# Patient Record
Sex: FEMALE | Race: WHITE | NOT HISPANIC OR LATINO | URBAN - METROPOLITAN AREA
[De-identification: names, ages, dates, MRNs, and addresses within clinical notes are randomized per-mention and may not be internally consistent; named-entity substitution may affect disease eponyms.]

---

## 2017-08-15 ENCOUNTER — INPATIENT (INPATIENT)
Facility: HOSPITAL | Age: 82
LOS: 2 days | Discharge: HOME | DRG: 391 | End: 2017-08-18
Attending: INTERNAL MEDICINE | Admitting: INTERNAL MEDICINE
Payer: MEDICARE

## 2017-08-15 VITALS — DIASTOLIC BLOOD PRESSURE: 104 MMHG | SYSTOLIC BLOOD PRESSURE: 152 MMHG | HEART RATE: 86 BPM

## 2017-08-15 DIAGNOSIS — M54.9 DORSALGIA, UNSPECIFIED: ICD-10-CM

## 2017-08-15 LAB
ALBUMIN SERPL ELPH-MCNC: 4.5 G/DL — SIGNIFICANT CHANGE UP (ref 3.3–5)
ALP SERPL-CCNC: 96 U/L — SIGNIFICANT CHANGE UP (ref 40–120)
ALT FLD-CCNC: 13 U/L RC — SIGNIFICANT CHANGE UP (ref 10–45)
ANION GAP SERPL CALC-SCNC: 14 MMOL/L — SIGNIFICANT CHANGE UP (ref 5–17)
APPEARANCE UR: CLEAR — SIGNIFICANT CHANGE UP
APTT BLD: 45.6 SEC — HIGH (ref 27.5–37.4)
AST SERPL-CCNC: 20 U/L — SIGNIFICANT CHANGE UP (ref 10–40)
BACTERIA # UR AUTO: ABNORMAL /HPF
BASOPHILS # BLD AUTO: 0 K/UL — SIGNIFICANT CHANGE UP (ref 0–0.2)
BASOPHILS NFR BLD AUTO: 0.2 % — SIGNIFICANT CHANGE UP (ref 0–2)
BILIRUB SERPL-MCNC: 0.7 MG/DL — SIGNIFICANT CHANGE UP (ref 0.2–1.2)
BILIRUB UR-MCNC: NEGATIVE — SIGNIFICANT CHANGE UP
BUN SERPL-MCNC: 17 MG/DL — SIGNIFICANT CHANGE UP (ref 7–23)
CALCIUM SERPL-MCNC: 9.3 MG/DL — SIGNIFICANT CHANGE UP (ref 8.4–10.5)
CHLORIDE SERPL-SCNC: 101 MMOL/L — SIGNIFICANT CHANGE UP (ref 96–108)
CO2 SERPL-SCNC: 26 MMOL/L — SIGNIFICANT CHANGE UP (ref 22–31)
COLOR SPEC: YELLOW — SIGNIFICANT CHANGE UP
CREAT SERPL-MCNC: 1.08 MG/DL — SIGNIFICANT CHANGE UP (ref 0.5–1.3)
CRP SERPL-MCNC: 6 MG/DL — HIGH (ref 0–0.4)
DIFF PNL FLD: ABNORMAL
EOSINOPHIL # BLD AUTO: 0.1 K/UL — SIGNIFICANT CHANGE UP (ref 0–0.5)
EOSINOPHIL NFR BLD AUTO: 1.2 % — SIGNIFICANT CHANGE UP (ref 0–6)
EPI CELLS # UR: SIGNIFICANT CHANGE UP /HPF
ERYTHROCYTE [SEDIMENTATION RATE] IN BLOOD: 37 MM/HR — HIGH (ref 0–20)
GLUCOSE SERPL-MCNC: 121 MG/DL — HIGH (ref 70–99)
GLUCOSE UR QL: NEGATIVE — SIGNIFICANT CHANGE UP
HCT VFR BLD CALC: 43.6 % — SIGNIFICANT CHANGE UP (ref 34.5–45)
HGB BLD-MCNC: 14.7 G/DL — SIGNIFICANT CHANGE UP (ref 11.5–15.5)
INR BLD: 3.24 RATIO — HIGH (ref 0.88–1.16)
KETONES UR-MCNC: NEGATIVE — SIGNIFICANT CHANGE UP
LEUKOCYTE ESTERASE UR-ACNC: NEGATIVE — SIGNIFICANT CHANGE UP
LYMPHOCYTES # BLD AUTO: 0.7 K/UL — LOW (ref 1–3.3)
LYMPHOCYTES # BLD AUTO: 7.5 % — LOW (ref 13–44)
MCHC RBC-ENTMCNC: 30.6 PG — SIGNIFICANT CHANGE UP (ref 27–34)
MCHC RBC-ENTMCNC: 33.6 GM/DL — SIGNIFICANT CHANGE UP (ref 32–36)
MCV RBC AUTO: 91 FL — SIGNIFICANT CHANGE UP (ref 80–100)
MONOCYTES # BLD AUTO: 0.7 K/UL — SIGNIFICANT CHANGE UP (ref 0–0.9)
MONOCYTES NFR BLD AUTO: 6.8 % — SIGNIFICANT CHANGE UP (ref 2–14)
NEUTROPHILS # BLD AUTO: 8.2 K/UL — HIGH (ref 1.8–7.4)
NEUTROPHILS NFR BLD AUTO: 84.4 % — HIGH (ref 43–77)
NITRITE UR-MCNC: NEGATIVE — SIGNIFICANT CHANGE UP
PH UR: 7 — SIGNIFICANT CHANGE UP (ref 5–8)
PLATELET # BLD AUTO: 149 K/UL — LOW (ref 150–400)
POTASSIUM SERPL-MCNC: 4 MMOL/L — SIGNIFICANT CHANGE UP (ref 3.5–5.3)
POTASSIUM SERPL-SCNC: 4 MMOL/L — SIGNIFICANT CHANGE UP (ref 3.5–5.3)
PROT SERPL-MCNC: 7.9 G/DL — SIGNIFICANT CHANGE UP (ref 6–8.3)
PROT UR-MCNC: 30 MG/DL
PROTHROM AB SERPL-ACNC: 36.2 SEC — HIGH (ref 9.8–12.7)
RBC # BLD: 4.8 M/UL — SIGNIFICANT CHANGE UP (ref 3.8–5.2)
RBC # FLD: 13.6 % — SIGNIFICANT CHANGE UP (ref 10.3–14.5)
RBC CASTS # UR COMP ASSIST: SIGNIFICANT CHANGE UP /HPF (ref 0–2)
SODIUM SERPL-SCNC: 141 MMOL/L — SIGNIFICANT CHANGE UP (ref 135–145)
SP GR SPEC: 1.02 — SIGNIFICANT CHANGE UP (ref 1.01–1.02)
UROBILINOGEN FLD QL: NEGATIVE — SIGNIFICANT CHANGE UP
WBC # BLD: 9.7 K/UL — SIGNIFICANT CHANGE UP (ref 3.8–10.5)
WBC # FLD AUTO: 9.7 K/UL — SIGNIFICANT CHANGE UP (ref 3.8–10.5)
WBC UR QL: SIGNIFICANT CHANGE UP /HPF (ref 0–5)

## 2017-08-15 PROCEDURE — 70450 CT HEAD/BRAIN W/O DYE: CPT | Mod: 26

## 2017-08-15 PROCEDURE — 71010: CPT | Mod: 26

## 2017-08-15 PROCEDURE — 76377 3D RENDER W/INTRP POSTPROCES: CPT | Mod: 26

## 2017-08-15 PROCEDURE — 72192 CT PELVIS W/O DYE: CPT | Mod: 26

## 2017-08-15 PROCEDURE — 99285 EMERGENCY DEPT VISIT HI MDM: CPT | Mod: GC

## 2017-08-15 RX ORDER — ACETAMINOPHEN 500 MG
1000 TABLET ORAL ONCE
Qty: 0 | Refills: 0 | Status: COMPLETED | OUTPATIENT
Start: 2017-08-15 | End: 2017-08-15

## 2017-08-15 RX ADMIN — Medication 400 MILLIGRAM(S): at 14:36

## 2017-08-15 NOTE — H&P ADULT - NSHPPHYSICALEXAM_GEN_ALL_CORE
PHYSICAL EXAM:      Constitutional: Well built, NAD, AAOx3    Eyes: No pallor or icterus.     ENMT: Normal.    Neck: Supple    Breasts:    Back: Normal.    Respiratory: B/L A/E present, no wheezing or rhonchi    Cardiovascular: S1 and S2+, no m, r, g.    Gastrointestinal: Soft, BS+, no organomegaly    Genitourinary: Deferred.    Rectal: Deferred.    Extremities: No C/C/E.    Vascular: Pedal Pulses equal     Neurological: AAOx3, No FND    Skin: No rash or wounds    Lymph Nodes: Not palpable    Musculoskeletal: No joint deformity. Tenderness low back.    Psychiatric: No agitation noted.

## 2017-08-15 NOTE — ED PROVIDER NOTE - PROGRESS NOTE DETAILS
Patient unable to ambulate with assistance or walker due to pain. Patient also febrile. Will obtain cxr, blood cultures, most likely admit to medicine for PT and potential antibiotics if source found.

## 2017-08-15 NOTE — ED ADULT NURSE NOTE - PMH
Atrial fibrillation    CVA (cerebral infarction)    Gastritis    Hypertension    IBS (irritable bowel syndrome)    Smoking

## 2017-08-15 NOTE — H&P ADULT - NSHPLABSRESULTS_GEN_ALL_CORE
14.7   9.7   )-----------( 149      ( 15 Aug 2017 14:18 )             43.6   08-15    141  |  101  |  17  ----------------------------<  121<H>  4.0   |  26  |  1.08    Ca    9.3      15 Aug 2017 14:18    TPro  7.9  /  Alb  4.5  /  TBili  0.7  /  DBili  x   /  AST  20  /  ALT  13  /  AlkPhos  96  08-15  < from: CT Pelvis Bony Only No Cont (08.15.17 @ 14:41) >    EXAM:  CT PELVIS BONY ONLY                          EXAM:  CT 3D RECONSTRUCT W CHANCE                            PROCEDURE DATE:  08/15/2017            INTERPRETATION:  CLINICAL INFORMATION: Blunt pelvic trauma.    COMPARISON: CT Chest Abdomen Pelvis from 9/13/2013.    PROCEDURE:   CT of the bony pelvis for trauma with coronal and 3-D constructions.    FINDINGS:    The patient is status post total left hip arthroplasty with cemented   femoral component. A cerclage wire surrounds the left femoral head of the   prosthesis and 2 screws transfix the acetabular cup to the left iliac   bone. There is mild heterotopic ossification about the anterior aspect of   the left hip joint and in the region of the lesser trochanter/distal left   iliopsoas tendon. But no periprosthetic fracture is identified. Chronic   appearing post traumatic deformities are noted in the left superior and   inferior pubic rami.    There is no evidence of a right-sided hip fracture. There is mild joint   space narrowing in the right hip. There is mild to moderate bilateral   sacroiliac arthrosis but no evidence of acute sacral alar fracture. Mild   pubic symphysis arthrosis is also noted.    There is a transitional vertebral body at the lumbosacral junction with   broad L5 transverse processes which partially articulate with the sacrum   on the left and nearly articulates with the sacrum on the right side.    Within the pelvis, there is sigmoid diverticulosis without diverticulitis   there is no free fluid in the pelvis.    There is a stable partially calcified left adnexal mass measuring 3.6 cm   in diameter, seen on prior CT from 9/13/2013,. This is nonspecific and   may reflect the left ovary. . This is unchanged and size in appearance   since the priorCT scan.     No pelvic hematoma seen.  There is a   calcified abdominal aorta and common iliac arteries.    Degenerative disc disease in the lower lumbar spine.    Three-D reconstructed images confirm these findings.    IMPRESSION:    Status post total left hip arthroplasty. No evidence of acute   periprosthetic fracture.    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    No acute fracture identified in the left hip, right hip, or throughout   the bony pelvis.    Nonspecific left adnexal mass which is stable since the prior CT scan of   9/13/2013      < end of copied text >

## 2017-08-15 NOTE — H&P ADULT - NSHPREVIEWOFSYSTEMS_GEN_ALL_CORE
REVIEW OF SYSTEMS      General:	No weight gain or weight loss, + fever. No weakness, malaise or fatigue. No headache.    Skin/Breast: No rash or wound  	  Ophthalmologic: No vision changes  	  ENMT:	No difficulty hearing, no sore throat.    Respiratory and Thorax: No cough or SOB  	  Cardiovascular: No chest pain or SOB or BROWNLEE. No palpitations or skipped beats.	 No leg swelling.    Gastrointestinal: No N/V/C/D. No Hematochezia	    Genitourinary: No dysuria or hematuria or incontinence	    Musculoskeletal: LBP. No C/C/E	    Neurological: No dementia or FND	    Psychiatric: No depression or anxiety	    Hematology/Lymphatics: No anemia, leukemia 	    Endocrine:	No polydipsia or polyuria, no heat or cold intolerance.     Allergic/Immunologic:	No allergies.

## 2017-08-15 NOTE — ED PROVIDER NOTE - OBJECTIVE STATEMENT
Dr. Frank Note: 86F here with daughter and an aide with c/o R posterior pelvis pain for 2 days s/p fall, witnessed by another aide who reports gentle fall but patient described fall on head, no LOC, c/o headache and posterior pain, worse with ambulation, better with rest.  Pt on coumadin for afib for prior stroke without residual weakness.  Pt at home with 24hr aides, uses walker. Dr. Frank Note: 86F here with daughter and an aide with c/o R posterior pelvis pain for 2 days s/p fall, witnessed by another aide who reports gentle fall but patient described fall on head, no LOC, c/o headache and posterior pain, worse with ambulation, better with rest.  Pt on coumadin for afib for prior stroke without residual weakness.  Pt at home with 24hr aides, uses walker. PMD: Raquel Delvalle

## 2017-08-15 NOTE — ED ADULT NURSE NOTE - OBJECTIVE STATEMENT
Pt BIBA for evaluation of lower back pain s/p fall.  Pt AXOX3, alert to person, place, year.  As per daughter has some confusion usually.  As per pt she was walking in home and fell backwards hitting a piece of furniture on head and lower back.  Pt c/o head and back pain.  Pt Daughter states uncertain about fall, fall occurred 2 days ago and was with aid but fall was unreported.  Pt calm, cooperative.  Pt states pain comes and goes in severity.  PT has 20 gauge in right A/C.  Pt denies dizziness, N/V, F/C, bleeding, dark stools, numbness/tingling, CP, SOB. Pt well appearing.

## 2017-08-15 NOTE — ED PROVIDER NOTE - MEDICAL DECISION MAKING DETAILS
Dr. Frank Note: r/o ICH and r/o pelvic hematoma given blunt trauma on coumadin, r/o pelvic fx, consider outpt given 24-hr aide and non-operative (vs hip fx and operative repair).

## 2017-08-15 NOTE — H&P ADULT - ASSESSMENT
86F here with daughter and an aide with c/o R posterior pelvis pain for 2 days s/p fall, witnessed by another aide who reports gentle fall but patient described fall on head, no LOC, c/o headache and posterior pain, worse with ambulation, better with rest.  Pt on coumadin for afib for prior stroke without residual weakness.  Pt at home with 24hr aides, uses walker. Pain 7/10, when she gets up. Also daughter concerned about low grade fever. PMD: Raquel Delvalle

## 2017-08-15 NOTE — ED ADULT NURSE REASSESSMENT NOTE - NS ED NURSE REASSESS COMMENT FT1
Pt straight cath'd for urine, 2 RNs present.  Patient education provided prior to insertion. Sterile technique maintained. Pt tolerated procedure well.  Drained 150ml clear yellow urine.

## 2017-08-16 DIAGNOSIS — I10 ESSENTIAL (PRIMARY) HYPERTENSION: ICD-10-CM

## 2017-08-16 DIAGNOSIS — R50.9 FEVER, UNSPECIFIED: ICD-10-CM

## 2017-08-16 DIAGNOSIS — I48.91 UNSPECIFIED ATRIAL FIBRILLATION: ICD-10-CM

## 2017-08-16 DIAGNOSIS — M54.9 DORSALGIA, UNSPECIFIED: ICD-10-CM

## 2017-08-16 LAB
CULTURE RESULTS: NO GROWTH — SIGNIFICANT CHANGE UP
INR BLD: 3.09 RATIO — HIGH (ref 0.88–1.16)
PROTHROM AB SERPL-ACNC: 34.5 SEC — HIGH (ref 9.8–12.7)
SPECIMEN SOURCE: SIGNIFICANT CHANGE UP

## 2017-08-16 RX ORDER — CEPHALEXIN 500 MG
250 CAPSULE ORAL DAILY
Qty: 0 | Refills: 0 | Status: DISCONTINUED | OUTPATIENT
Start: 2017-08-16 | End: 2017-08-18

## 2017-08-16 RX ORDER — QUETIAPINE FUMARATE 200 MG/1
25 TABLET, FILM COATED ORAL AT BEDTIME
Qty: 0 | Refills: 0 | Status: DISCONTINUED | OUTPATIENT
Start: 2017-08-16 | End: 2017-08-17

## 2017-08-16 RX ORDER — LOSARTAN POTASSIUM 100 MG/1
50 TABLET, FILM COATED ORAL DAILY
Qty: 0 | Refills: 0 | Status: DISCONTINUED | OUTPATIENT
Start: 2017-08-16 | End: 2017-08-18

## 2017-08-16 RX ORDER — SERTRALINE 25 MG/1
100 TABLET, FILM COATED ORAL DAILY
Qty: 0 | Refills: 0 | Status: DISCONTINUED | OUTPATIENT
Start: 2017-08-16 | End: 2017-08-18

## 2017-08-16 RX ORDER — SERTRALINE 25 MG/1
50 TABLET, FILM COATED ORAL DAILY
Qty: 0 | Refills: 0 | Status: DISCONTINUED | OUTPATIENT
Start: 2017-08-16 | End: 2017-08-18

## 2017-08-16 RX ORDER — CHOLECALCIFEROL (VITAMIN D3) 125 MCG
1000 CAPSULE ORAL DAILY
Qty: 0 | Refills: 0 | Status: DISCONTINUED | OUTPATIENT
Start: 2017-08-16 | End: 2017-08-18

## 2017-08-16 RX ADMIN — SERTRALINE 50 MILLIGRAM(S): 25 TABLET, FILM COATED ORAL at 12:27

## 2017-08-16 RX ADMIN — Medication 250 MILLIGRAM(S): at 22:02

## 2017-08-16 RX ADMIN — SERTRALINE 100 MILLIGRAM(S): 25 TABLET, FILM COATED ORAL at 12:27

## 2017-08-16 RX ADMIN — Medication 1000 UNIT(S): at 12:26

## 2017-08-16 RX ADMIN — LOSARTAN POTASSIUM 50 MILLIGRAM(S): 100 TABLET, FILM COATED ORAL at 05:36

## 2017-08-16 NOTE — CONSULT NOTE ADULT - ASSESSMENT
87 y/o F with PMH of CVA, Afib (on coumadin), IBS, chronic UTI's, HTN, former tobacco abuse (35 pack yrs) presents 8/15 after mechanical fall at home. Noted to have low grade fever in ER of unclear etiology

## 2017-08-16 NOTE — CONSULT NOTE ADULT - PROBLEM SELECTOR RECOMMENDATION 9
-f/u pan cultures  -Hold on abx per ID recs  -No evidence of PNA on CXR, patient is normoxic (94% on RA) with no pulmonary complaints

## 2017-08-16 NOTE — PROGRESS NOTE ADULT - SUBJECTIVE AND OBJECTIVE BOX
Patient is a 86y old  Female who presents with a chief complaint of S/p fall & back pain, UTI ? (15 Aug 2017 21:39)  Patient seen and examined, daughter at bedside.    INTERVAL HPI/OVERNIGHT EVENTS: None    T(C): 36.8 (17 @ 16:00), Max: 36.8 (17 @ 16:00)  HR: 91 (17 @ 16:00) (91 - 98)  BP: 110/65 (17 @ 16:00) (110/65 - 148/76)  RR: 18 (17 @ 16:00) (18 - 18)  SpO2: 94% (17 @ 16:00) (94% - 95%)  Wt(kg): --  I&O's Summary    16 Aug 2017 07:01  -  16 Aug 2017 23:33  --------------------------------------------------------  IN: 720 mL / OUT: 0 mL / NET: 720 mL        PAST MEDICAL & SURGICAL HISTORY:  IBS (irritable bowel syndrome)  Gastritis  Atrial fibrillation  CVA (cerebral infarction)  Smoking  Hypertension  No significant past surgical history      REVIEW OF SYSTEMS:  CONSTITUTIONAL: No fever, weight loss, or fatigue  EYES: No eye pain, visual disturbances, or discharge  ENMT:  No difficulty hearing, tinnitus, vertigo; No sinus or throat pain  NECK: No pain or stiffness  RESPIRATORY: No cough, wheezing, chills or hemoptysis; No shortness of breath  CARDIOVASCULAR: No chest pain, palpitations, dizziness, or leg swelling  GASTROINTESTINAL: No abdominal or epigastric pain. No nausea, vomiting, or hematemesis; No diarrhea or constipation. No melena or hematochezia.  GENITOURINARY: No dysuria, frequency, hematuria, or incontinence  NEUROLOGICAL: No headaches, memory loss, loss of strength, numbness, or tremors  SKIN: No itching, burning, rashes, or lesions   LYMPH NODES: No enlarged glands  ENDOCRINE: No heat or cold intolerance; No hair loss  MUSCULOSKELETAL: No joint pain or swelling; No extremity pain. +LBP  PSYCHIATRIC: No depression, anxiety, mood swings, or difficulty sleeping  HEME/LYMPH: No easy bruising, or bleeding gums  ALLERY AND IMMUNOLOGIC: No hives or eczema    RADIOLOGY & ADDITIONAL TESTS:    Imaging Personally Reviewed:  [ ] YES  [ ] NO    Consultant(s) Notes Reviewed:  [+ ] YES  [ ] NO    PHYSICAL EXAM:  GENERAL: NAD, well-groomed, well-developed  HEAD:  Atraumatic, Normocephalic  EYES: EOMI, PERRLA, conjunctiva and sclera clear  ENMT: No tonsillar erythema, exudates, or enlargement; Moist mucous membranes, Good dentition, No lesions  NECK: Supple, No JVD, Normal thyroid  NERVOUS SYSTEM:  Alert & Oriented X3, Good concentration; Motor Strength 5/5 B/L upper and lower extremities; DTRs 2+ intact and symmetric  CHEST/LUNG: Clear to percussion bilaterally; No rales, rhonchi, wheezing, or rubs  HEART: Regular rate and rhythm; No murmurs, rubs, or gallops  ABDOMEN: Soft, Nontender, Nondistended; Bowel sounds present  EXTREMITIES:  2+ Peripheral Pulses, No clubbing, cyanosis, or edema  LYMPH: No lymphadenopathy noted  SKIN: No rashes or lesions    LABS:                        14.7   9.7   )-----------( 149      ( 15 Aug 2017 14:18 )             43.6     08-15    141  |  101  |  17  ----------------------------<  121<H>  4.0   |  26  |  1.08    Ca    9.3      15 Aug 2017 14:18    TPro  7.9  /  Alb  4.5  /  TBili  0.7  /  DBili  x   /  AST  20  /  ALT  13  /  AlkPhos  96  08-15    PT/INR - ( 16 Aug 2017 16:30 )   PT: 34.5 sec;   INR: 3.09 ratio         PTT - ( 15 Aug 2017 14:18 )  PTT:45.6 sec  Urinalysis Basic - ( 15 Aug 2017 15:23 )    Color: Yellow / Appearance: Clear / S.016 / pH: x  Gluc: x / Ketone: Negative  / Bili: Negative / Urobili: Negative   Blood: x / Protein: 30 mg/dL / Nitrite: Negative   Leuk Esterase: Negative / RBC: 0-2 /HPF / WBC 3-5 /HPF   Sq Epi: x / Non Sq Epi: x / Bacteria: Few /HPF      CAPILLARY BLOOD GLUCOSE            Urinalysis Basic - ( 15 Aug 2017 15:23 )    Color: Yellow / Appearance: Clear / S.016 / pH: x  Gluc: x / Ketone: Negative  / Bili: Negative / Urobili: Negative   Blood: x / Protein: 30 mg/dL / Nitrite: Negative   Leuk Esterase: Negative / RBC: 0-2 /HPF / WBC 3-5 /HPF   Sq Epi: x / Non Sq Epi: x / Bacteria: Few /HPF        MEDICATIONS  (STANDING):  losartan 50 milliGRAM(s) Oral daily  sertraline 100 milliGRAM(s) Oral daily  sertraline 50 milliGRAM(s) Oral daily  cholecalciferol 1000 Unit(s) Oral daily  cephalexin 250 milliGRAM(s) Oral daily  QUEtiapine 25 milliGRAM(s) Oral at bedtime    MEDICATIONS  (PRN):      Care Discussed with Consultants/Other Providers [ ] YES  [ ] NO

## 2017-08-16 NOTE — CHART NOTE - NSCHARTNOTEFT_GEN_A_CORE
Pt daughter verbalized that pt does not have allergy to yeast, wheat or gluten. Will remove allergy from diet.  Laure SCOTT

## 2017-08-16 NOTE — CONSULT NOTE ADULT - SUBJECTIVE AND OBJECTIVE BOX
CHIEF COMPLAINT:Patient is a 86y old  Female who presents with a chief complaint of S/p fall & back pain, UTI ? (15 Aug 2017 21:39)      HISTORY OF PRESENT ILLNESS:HPI:  86F here with daughter and an aide with c/o R posterior pelvis pain for 2 days s/p fall, witnessed by another aide who reports gentle fall but patient described fall on head, no LOC, c/o headache and posterior pain, worse with ambulation, better with rest.  Pt on coumadin for afib for prior stroke without residual weakness.  Pt at home with 24hr aides, uses walker. Pain 7/10, when she gets up. Also daughter concerned about low grade fever. Pt reports 3 traumatic falls in the past with resultant fractures. She was hospitalized at [facility] and upon discussion with her cardiologist the continued AC.   PMD: Raquel Delvalle (15 Aug 2017 21:09)      PAST MEDICAL & SURGICAL HISTORY:  IBS (irritable bowel syndrome)  Gastritis  Atrial fibrillation  CVA (cerebral infarction)  Smoking  Hypertension  No significant past surgical history          MEDICATIONS:  losartan 50 milliGRAM(s) Oral daily    cephalexin 250 milliGRAM(s) Oral daily      sertraline 100 milliGRAM(s) Oral daily  sertraline 50 milliGRAM(s) Oral daily  QUEtiapine 25 milliGRAM(s) Oral at bedtime        cholecalciferol 1000 Unit(s) Oral daily      FAMILY HISTORY:      Non-contributory    SOCIAL HISTORY:    not an active smoker    Allergies    Levaquin (Faint)  penicillin (Faint)  sulfa drugs (Unknown)    Intolerances    Lorabid (Faint)  	    REVIEW OF SYSTEMS:  CONSTITUTIONAL: No fever  EYES: No eye pain, visual disturbances, or discharge  ENMT:  No difficulty hearing, tinnitus  NECK: No pain or stiffness  RESPIRATORY: No cough, wheezing,  CARDIOVASCULAR: No chest pain, palpitations, dizziness, or leg swelling  GASTROINTESTINAL:  No nausea, vomiting, diarrhea or constipation. No melena.  GENITOURINARY: No dysuria, hematuria  NEUROLOGICAL: No stroke like symptoms  SKIN: No burning or lesions   LYMPH Nodes: No enlarged glands  ENDOCRINE: No heat or cold intolerance  MUSCULOSKELETAL: No joint pain or swelling  PSYCHIATRIC: No  anxiety, mood swings  HEME/LYMPH: No bleeding gums  ALLERY AND IMMUNOLOGIC: No hives or eczema	    All other ROS negative    PHYSICAL EXAM:  T(C): 36.8 (08-16-17 @ 16:00), Max: 36.8 (08-16-17 @ 16:00)  HR: 91 (08-16-17 @ 16:00) (91 - 98)  BP: 110/65 (08-16-17 @ 16:00) (110/65 - 148/76)  RR: 18 (08-16-17 @ 16:00) (18 - 18)  SpO2: 94% (08-16-17 @ 16:00) (94% - 95%)  Wt(kg): --  I&O's Summary    16 Aug 2017 07:01  -  16 Aug 2017 21:17  --------------------------------------------------------  IN: 720 mL / OUT: 0 mL / NET: 720 mL        Appearance: Normal	  HEENT:   Normal oral mucosa, EOMI	  Lymphatic: No lymphadenopathy  Cardiovascular: Normal S1 S2, irregular  Respiratory: Lungs clear to auscultation	  Psychiatry: Alert, Mood & affect appropriate  Gastrointestinal:  Soft, Non-tender, + BS	  Skin: No rashes, No ecchymoses, No cyanosis	  Neurologic: Non-focal  Extremities:  No clubbing, cyanosis or edema  Vascular: Peripheral pulses palpable 2+ bilaterally  	    ECG:  none in chart	  RADIOLOGY:  	  	  CARDIAC MARKERS:                                  14.7   9.7   )-----------( 149      ( 15 Aug 2017 14:18 )             43.6     08-15    141  |  101  |  17  ----------------------------<  121<H>  4.0   |  26  |  1.08    Ca    9.3      15 Aug 2017 14:18    TPro  7.9  /  Alb  4.5  /  TBili  0.7  /  DBili  x   /  AST  20  /  ALT  13  /  AlkPhos  96  08-15    proBNP:   Lipid Profile:   HgA1c:   TSH:       Assesment/Plan:   86F with h/o AF, HTN and CVA here with daughter and an aide with c/o R posterior pelvis pain for 2 days s/p fall  1. AF - I had an extensive discussion with daughter       Milton MckeonDO [facility]  [facility]  [phone] CHIEF COMPLAINT:Patient is a 86y old  Female who presents with a chief complaint of S/p fall & back pain, UTI ? (15 Aug 2017 21:39)      HISTORY OF PRESENT ILLNESS:HPI:  86F here with daughter and an aide with c/o R posterior pelvis pain for 2 days s/p fall, witnessed by another aide who reports gentle fall but patient described fall on head, no LOC, c/o headache and posterior pain, worse with ambulation, better with rest.  Pt on coumadin for afib for prior stroke without residual weakness.  Pt at home with 24hr aides, uses walker. Pain 7/10, when she gets up. Also daughter concerned about low grade fever. Pt reports 3 traumatic falls in the past with resultant fractures. She was hospitalized at [facility] and upon discussion with her cardiologist the continued AC.   PMD: Raquel Delvalle (15 Aug 2017 21:09)      PAST MEDICAL & SURGICAL HISTORY:  IBS (irritable bowel syndrome)  Gastritis  Atrial fibrillation  CVA (cerebral infarction)  Smoking  Hypertension  No significant past surgical history          MEDICATIONS:  losartan 50 milliGRAM(s) Oral daily    cephalexin 250 milliGRAM(s) Oral daily      sertraline 100 milliGRAM(s) Oral daily  sertraline 50 milliGRAM(s) Oral daily  QUEtiapine 25 milliGRAM(s) Oral at bedtime        cholecalciferol 1000 Unit(s) Oral daily      FAMILY HISTORY:      Non-contributory    SOCIAL HISTORY:    not an active smoker    Allergies    Levaquin (Faint)  penicillin (Faint)  sulfa drugs (Unknown)    Intolerances    Lorabid (Faint)  	    REVIEW OF SYSTEMS:  CONSTITUTIONAL: No fever  EYES: No eye pain, visual disturbances, or discharge  ENMT:  No difficulty hearing, tinnitus  NECK: No pain or stiffness  RESPIRATORY: No cough, wheezing,  CARDIOVASCULAR: No chest pain, palpitations, dizziness, or leg swelling  GASTROINTESTINAL:  No nausea, vomiting, diarrhea or constipation. No melena.  GENITOURINARY: No dysuria, hematuria  NEUROLOGICAL: No stroke like symptoms  SKIN: No burning or lesions   LYMPH Nodes: No enlarged glands  ENDOCRINE: No heat or cold intolerance  MUSCULOSKELETAL: No joint pain or swelling  PSYCHIATRIC: No  anxiety, mood swings  HEME/LYMPH: No bleeding gums  ALLERY AND IMMUNOLOGIC: No hives or eczema	    All other ROS negative    PHYSICAL EXAM:  T(C): 36.8 (08-16-17 @ 16:00), Max: 36.8 (08-16-17 @ 16:00)  HR: 91 (08-16-17 @ 16:00) (91 - 98)  BP: 110/65 (08-16-17 @ 16:00) (110/65 - 148/76)  RR: 18 (08-16-17 @ 16:00) (18 - 18)  SpO2: 94% (08-16-17 @ 16:00) (94% - 95%)  Wt(kg): --  I&O's Summary    16 Aug 2017 07:01  -  16 Aug 2017 21:17  --------------------------------------------------------  IN: 720 mL / OUT: 0 mL / NET: 720 mL        Appearance: Normal	  HEENT:   Normal oral mucosa, EOMI	  Lymphatic: No lymphadenopathy  Cardiovascular: Normal S1 S2, irregular  Respiratory: Lungs clear to auscultation	  Psychiatry: Alert, Mood & affect appropriate  Gastrointestinal:  Soft, Non-tender, + BS	  Skin: No rashes, No ecchymoses, No cyanosis	  Neurologic: Non-focal  Extremities:  No clubbing, cyanosis or edema  Vascular: Peripheral pulses palpable 2+ bilaterally  	    ECG:  none in chart	  RADIOLOGY:  	  	  CARDIAC MARKERS:                                  14.7   9.7   )-----------( 149      ( 15 Aug 2017 14:18 )             43.6     08-15    141  |  101  |  17  ----------------------------<  121<H>  4.0   |  26  |  1.08    Ca    9.3      15 Aug 2017 14:18    TPro  7.9  /  Alb  4.5  /  TBili  0.7  /  DBili  x   /  AST  20  /  ALT  13  /  AlkPhos  96  08-15    proBNP:   Lipid Profile:   HgA1c:   TSH:       Assesment/Plan:   86F with h/o AF, HTN and CVA here with daughter and an aide with c/o R posterior pelvis pain for 2 days s/p fall  1. AF - I had an extensive discussion with daughter and pt about the risks and benefits of continuing AC -  risk of life threatening bleed Vs CVA ppx. At this point they wish to continue with coumadin They state her INR at home is usually well controlled and not labile    2. HTN - well controlled on Losartan    3. Fall - MRI pending  - PT eval         Milton Mckeon DO [facility]  [facility]  [phone]

## 2017-08-16 NOTE — CONSULT NOTE ADULT - SUBJECTIVE AND OBJECTIVE BOX
PULMONARY CONSULT    HPI:  86F here with daughter and an aide with c/o R posterior pelvis pain for 2 days s/p fall, witnessed by another aide who reports gentle fall but patient described fall on head, no LOC, c/o headache and posterior pain, worse with ambulation, better with rest.  Pt on coumadin for afib for prior stroke without residual weakness.  Pt at home with 24hr aides, uses walker. Pain 7/10, when she gets up. Pt noted to have low grade fever in ER. Patient denies SOB, cough, N/V/D, c/o chronic dysuria. Mostly c/o R sided hip pain      PAST MEDICAL & SURGICAL HISTORY:  IBS (irritable bowel syndrome)  Gastritis  Atrial fibrillation  CVA (cerebral infarction)  Smoking  Hypertension  No significant past surgical history    Allergies    Levaquin (Faint)  penicillin (Faint)  sulfa drugs (Unknown)  wheat, yeast, gluten (Flatulence; Stomach Upset; Nausea)    Intolerances    Lorabid (Faint)    FAMILY HISTORY: Non-contributory     Social history: Former Smoker  ~35 pack yrs. Quit 4 years ago    Review of Systems:  CONSTITUTIONAL: +fever, chills, or fatigue  EYES: No eye pain, visual disturbances, or discharge  ENMT:  No difficulty hearing, tinnitus, vertigo; No sinus or throat pain  NECK: No pain or stiffness  RESPIRATORY: Per above  CARDIOVASCULAR: No chest pain, palpitations, dizziness, or leg swelling  GASTROINTESTINAL: No abdominal or epigastric pain. No nausea, vomiting, or hematemesis; No diarrhea or constipation. No melena or hematochezia.  GENITOURINARY: +chronic dysuria, frequency, hematuria, or incontinence  NEUROLOGICAL: No headaches, memory loss, loss of strength, numbness, or tremors  SKIN: No itching, burning, rashes, or lesions   MUSCULOSKELETAL: No joint pain or swelling; +R sided back pain  PSYCHIATRIC: No depression, anxiety, mood swings, or difficulty sleeping      Medications:  MEDICATIONS  (STANDING):  losartan 50 milliGRAM(s) Oral daily  sertraline 100 milliGRAM(s) Oral daily  sertraline 50 milliGRAM(s) Oral daily  cholecalciferol 1000 Unit(s) Oral daily    MEDICATIONS  (PRN):            Vital Signs Last 24 Hrs  T(C): 36.7 (16 Aug 2017 08:44), Max: 38.1 (15 Aug 2017 14:50)  T(F): 98 (16 Aug 2017 08:44), Max: 100.5 (15 Aug 2017 14:50)  HR: 98 (16 Aug 2017 08:44) (86 - 98)  BP: 148/76 (16 Aug 2017 08:44) (123/74 - 168/101)  BP(mean): --  RR: 18 (16 Aug 2017 08:44) (16 - 20)  SpO2: 95% (16 Aug 2017 08:44) (92% - 97%) on RA                  LABS:                        14.7   9.7   )-----------( 149      ( 15 Aug 2017 14:18 )             43.6     08-15    141  |  101  |  17  ----------------------------<  121<H>  4.0   |  26  |  1.08    Ca    9.3      15 Aug 2017 14:18    TPro  7.9  /  Alb  4.5  /  TBili  0.7  /  DBili  x   /  AST  20  /  ALT  13  /  AlkPhos  96  08-15          CAPILLARY BLOOD GLUCOSE        PT/INR - ( 15 Aug 2017 14:18 )   PT: 36.2 sec;   INR: 3.24 ratio         PTT - ( 15 Aug 2017 14:18 )  PTT:45.6 sec  Urinalysis Basic - ( 15 Aug 2017 15:23 )    Color: Yellow / Appearance: Clear / S.016 / pH: x  Gluc: x / Ketone: Negative  / Bili: Negative / Urobili: Negative   Blood: x / Protein: 30 mg/dL / Nitrite: Negative   Leuk Esterase: Negative / RBC: 0-2 /HPF / WBC 3-5 /HPF   Sq Epi: x / Non Sq Epi: x / Bacteria: Few /HPF                    CULTURES: (if applicable)  UA neg        Physical Examination:    General: No acute distress.      HEENT: Pupils equal, reactive to light.  Symmetric.    PULM: Decreased BS at bases, no wheeze    CVS: S1, S2 irreg    ABD: Soft, nondistended, nontender, normoactive bowel sounds, no masses    EXT: No edema, nontender    SKIN: Warm and well perfused, no rashes noted.    NEURO: Alert, oriented, interactive, nonfocal    RADIOLOGY REVIEWED  CXR: Grossly clear  L basilar atelectasis

## 2017-08-16 NOTE — CONSULT NOTE ADULT - SUBJECTIVE AND OBJECTIVE BOX
HPI:  86F here with daughter and an aide with c/o R posterior pelvis pain for 2 days s/p fall, witnessed by another aide who reports gentle fall but patient described fall on head, no LOC, c/o headache and posterior pain, worse with ambulation, better with rest.  Pt on coumadin for afib for prior stroke without residual weakness.  Pt at home with 24hr aides, uses walker. Pain 7/10, when she gets up. Also daughter concerned about low grade fever. PMD: Mahnaz-Marley Delvalle (15 Aug 2017 21:09)   Patient seen and examined.     PAST MEDICAL & SURGICAL HISTORY:  IBS (irritable bowel syndrome)  Gastritis  Atrial fibrillation  CVA (cerebral infarction)  Smoking  Hypertension  No significant past surgical history      Allergies    Levaquin (Faint)  penicillin (Faint)  sulfa drugs (Unknown)  wheat, yeast, gluten (Flatulence; Stomach Upset; Nausea)    Intolerances    Lorabid (Faint)      MEDICATIONS  (STANDING):  losartan 50 milliGRAM(s) Oral daily  sertraline 100 milliGRAM(s) Oral daily  sertraline 50 milliGRAM(s) Oral daily  cholecalciferol 1000 Unit(s) Oral daily      MEDICATIONS  (PRN):   Medications up to date at time of exam.    Medications up to date at time of exam.    FAMILY HISTORY:      SOCIAL HISTORY  Smoking History:   Living Condition:  Work History:   Travel History: denies recent travel  Illicit Substance Use: denies  Alcohol Use: denies  Pets:    REVIEW OF SYSTEMS:    Detailed 10 point ROS done.       PHYSICAL EXAMINATION:      Vital Signs Last 24 Hrs  T(C): 36.3 (15 Aug 2017 21:12), Max: 38.1 (15 Aug 2017 14:50)  T(F): 97.3 (15 Aug 2017 21:12), Max: 100.5 (15 Aug 2017 14:50)  HR: 95 (15 Aug 2017 21:12) (86 - 98)  BP: 123/74 (15 Aug 2017 21:12) (123/74 - 168/101)  BP(mean): --  RR: 20 (15 Aug 2017 21:12) (16 - 20)  SpO2: 95% (15 Aug 2017 21:12) (92% - 97%)   (if applicable)    GENERAL: The patient is a well-developed, well-nourished, in no apparent distress.     HEENT: Head is normocephalic and atraumatic. Extraocular muscles are intact. Mucous membranes are moist.     NECK: Supple, no palpable adenopathy.    LUNGS: Clear to auscultation, no wheezing, rales, or rhonchi.    HEART: Regular rate and rhythm without murmur.    ABDOMEN: Soft, nontender, and nondistended.  No hepatosplenomegaly is noted.    EXTREMITIES: Without any cyanosis, clubbing, rash, lesions or edema.    NEUROLOGIC: Awake, alert, oriented.     SKIN: Warm, dry, good turgor.      LABS:                        14.7   9.7   )-----------( 149      ( 15 Aug 2017 14:18 )             43.6     08-15    141  |  101  |  17  ----------------------------<  121<H>  4.0   |  26  |  1.08    Ca    9.3      15 Aug 2017 14:18    TPro  7.9  /  Alb  4.5  /  TBili  0.7  /  DBili  x   /  AST  20  /  ALT  13  /  AlkPhos  96  08-15    PT/INR - ( 15 Aug 2017 14:18 )   PT: 36.2 sec;   INR: 3.24 ratio         PTT - ( 15 Aug 2017 14:18 )  PTT:45.6 sec  Urinalysis Basic - ( 15 Aug 2017 15:23 )    Color: Yellow / Appearance: Clear / S.016 / pH: x  Gluc: x / Ketone: Negative  / Bili: Negative / Urobili: Negative   Blood: x / Protein: 30 mg/dL / Nitrite: Negative   Leuk Esterase: Negative / RBC: 0-2 /HPF / WBC 3-5 /HPF   Sq Epi: x / Non Sq Epi: x / Bacteria: Few /HPF      < from: CT Head No Cont (08.15.17 @ 14:35) >    Impression: No evidence of acute hemorrhage, mass or mass effect.    < end of copied text >        < from: CT 3D Reconstruct w/ Workstation (08.15.17 @ 14:41) >  IMPRESSION:    Status post total left hip arthroplasty. No evidence of acute   periprosthetic fracture.    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    No acute fracture identified in the left hip, right hip, or throughout   the bony pelvis.    Nonspecific left adnexal mass which is stable since the prior CT scan of   2013    < end of copied text >                MICROBIOLOGY: (if applicable)    RADIOLOGY & ADDITIONAL STUDIES:  EKG:   CXR:  ECHO:      RECOMMENDATIONS: HPI:  86F here with daughter and an aide with c/o R posterior pelvis pain for 2 days s/p fall, witnessed by another aide who reports gentle fall but patient described fall on head, no LOC, c/o headache and posterior pain, worse with ambulation, better with rest.  Pt on coumadin for afib for prior stroke without residual weakness.  Pt at home with 24hr aides, uses walker. Pain 7/10, when she gets up. Also daughter concerned about low grade fever.  At my exam today,patient is awake. HHA is at bedside. Her main symptom in pain in lower back since fall and difficulty in ambulation. pt denies any fever, chills, headache, vomiting, nausea , abd pain, diarrhea, cough, phlegm or SOB. She had chronic mild dysuria and reports frequent UTIs. no rash. From patient description, it seems that she had a mechanical fall.  no other symptom on 10 point ROS.   Patient seen and examined.     PAST MEDICAL & SURGICAL HISTORY:  IBS (irritable bowel syndrome)  Gastritis  Atrial fibrillation  CVA (cerebral infarction)  Smoking  Hypertension  No significant past surgical history      Allergies    Levaquin (Faint)  penicillin (Faint)  sulfa drugs (Unknown)  wheat, yeast, gluten (Flatulence; Stomach Upset; Nausea)    Intolerances    Lorabid (Faint)      MEDICATIONS  (STANDING):  losartan 50 milliGRAM(s) Oral daily  sertraline 100 milliGRAM(s) Oral daily  sertraline 50 milliGRAM(s) Oral daily  cholecalciferol 1000 Unit(s) Oral daily      MEDICATIONS  (PRN):   Medications up to date at time of exam.    Medications up to date at time of exam.    FAMILY HISTORY:      SOCIAL HISTORY  Smoking History:    Living Condition: home with A  Work History:   Travel History: denies recent travel  Illicit Substance Use: denies  Alcohol Use: denies  Pets:    REVIEW OF SYSTEMS:    Detailed 10 point ROS done.       PHYSICAL EXAMINATION:      Vital Signs Last 24 Hrs  T(C): 36.3 (15 Aug 2017 21:12), Max: 38.1 (15 Aug 2017 14:50)  T(F): 97.3 (15 Aug 2017 21:12), Max: 100.5 (15 Aug 2017 14:50)  HR: 95 (15 Aug 2017 21:12) (86 - 98)  BP: 123/74 (15 Aug 2017 21:12) (123/74 - 168/101)  BP(mean): --  RR: 20 (15 Aug 2017 21:12) (16 - 20)  SpO2: 95% (15 Aug 2017 21:12) (92% - 97%)   (if applicable)    GENERAL: The patient is a well-developed, well-nourished, in no apparent distress.     HEENT: Head is normocephalic and atraumatic. Extraocular muscles are intact. Mucous membranes are moist. wearing nasal cannula O2.    NECK: Supple, no palpable adenopathy.    LUNGS: Clear to auscultation, no wheezing, rales, or rhonchi.    HEART: Regular rate and rhythm without murmur.    ABDOMEN: Soft, nontender, and nondistended.  No hepatosplenomegaly is noted. no suprapubic or CVA tenderness.     EXTREMITIES: Without any cyanosis, clubbing, rash, lesions or edema.    NEUROLOGIC: Awake, alert, oriented. non focal.     SKIN: Warm, dry, good turgor.      LABS:                        14.7   9.7   )-----------( 149      ( 15 Aug 2017 14:18 )             43.6     08-15    141  |  101  |  17  ----------------------------<  121<H>  4.0   |  26  |  1.08    Ca    9.3      15 Aug 2017 14:18    TPro  7.9  /  Alb  4.5  /  TBili  0.7  /  DBili  x   /  AST  20  /  ALT  13  /  AlkPhos  96  15    PT/INR - ( 15 Aug 2017 14:18 )   PT: 36.2 sec;   INR: 3.24 ratio         PTT - ( 15 Aug 2017 14:18 )  PTT:45.6 sec  Urinalysis Basic - ( 15 Aug 2017 15:23 )    Color: Yellow / Appearance: Clear / S.016 / pH: x  Gluc: x / Ketone: Negative  / Bili: Negative / Urobili: Negative   Blood: x / Protein: 30 mg/dL / Nitrite: Negative   Leuk Esterase: Negative / RBC: 0-2 /HPF / WBC 3-5 /HPF   Sq Epi: x / Non Sq Epi: x / Bacteria: Few /HPF      < from: CT Head No Cont (08.15.17 @ 14:35) >    Impression: No evidence of acute hemorrhage, mass or mass effect.    < end of copied text >        < from: CT 3D Reconstruct w/ Workstation (08.15.17 @ 14:41) >  IMPRESSION:    Status post total left hip arthroplasty. No evidence of acute   periprosthetic fracture.    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    No acute fracture identified in the left hip, right hip, or throughout   the bony pelvis.    Nonspecific left adnexal mass which is stable since the prior CT scan of   2013    < end of copied text >                MICROBIOLOGY: (if applicable)    RADIOLOGY & ADDITIONAL STUDIES:  EKG:   CXR:  ECHO:      RECOMMENDATIONS:

## 2017-08-16 NOTE — CONSULT NOTE ADULT - ASSESSMENT
86F here with daughter and an aide with c/o R posterior pelvis pain for 2 days s/p fall, witnessed by another aide who reports gentle fall but patient described fall on head, no LOC, c/o headache and posterior pain, worse with ambulation, better with rest.  Pt on coumadin for afib for prior stroke without residual weakness.  Pt at home with 24hr aides, uses walker. Pain 7/10, when she gets up. Also daughter concerned about low grade fever. PMD: Raquel Delvalle (15 Aug 2017 21:09) 86F here with daughter and an aide with c/o R posterior pelvis pain for 2 days s/p fall, witnessed by another aide who reports gentle fall but patient described fall on head, no LOC, c/o headache and posterior pain, worse with ambulation, better with rest.  Pt on coumadin for afib for prior stroke without residual weakness.    ID is consulted for low grade fever in .5.    Impression/plan:   1. one episode of low grade fever.   patient remained afebrile since, has normal WBC counts and is hemodynamically stable. U/a shows no pyuria.     ·	advise to observe off any new antibiotics.   ·	follow up final blood cultures and urine cultures.  ·	As per A, pt is on daily cephalexin for long term UTI suppression, that can be continued as prescribed by her PMD.     Case discussed in detail with the primary team.  Thanks for this consultation. please call me if any questions at [phone].

## 2017-08-17 ENCOUNTER — TRANSCRIPTION ENCOUNTER (OUTPATIENT)
Age: 82
End: 2017-08-17

## 2017-08-17 DIAGNOSIS — F32.3 MAJOR DEPRESSIVE DISORDER, SINGLE EPISODE, SEVERE WITH PSYCHOTIC FEATURES: ICD-10-CM

## 2017-08-17 LAB
ANION GAP SERPL CALC-SCNC: 15 MMOL/L — SIGNIFICANT CHANGE UP (ref 5–17)
BUN SERPL-MCNC: 20 MG/DL — SIGNIFICANT CHANGE UP (ref 7–23)
CALCIUM SERPL-MCNC: 8.4 MG/DL — SIGNIFICANT CHANGE UP (ref 8.4–10.5)
CHLORIDE SERPL-SCNC: 100 MMOL/L — SIGNIFICANT CHANGE UP (ref 96–108)
CO2 SERPL-SCNC: 23 MMOL/L — SIGNIFICANT CHANGE UP (ref 22–31)
CREAT SERPL-MCNC: 1 MG/DL — SIGNIFICANT CHANGE UP (ref 0.5–1.3)
GLUCOSE SERPL-MCNC: 95 MG/DL — SIGNIFICANT CHANGE UP (ref 70–99)
HCT VFR BLD CALC: 37 % — SIGNIFICANT CHANGE UP (ref 34.5–45)
HGB BLD-MCNC: 12.5 G/DL — SIGNIFICANT CHANGE UP (ref 11.5–15.5)
INR BLD: 2.13 RATIO — HIGH (ref 0.88–1.16)
MCHC RBC-ENTMCNC: 28.8 PG — SIGNIFICANT CHANGE UP (ref 27–34)
MCHC RBC-ENTMCNC: 33.8 GM/DL — SIGNIFICANT CHANGE UP (ref 32–36)
MCV RBC AUTO: 85.3 FL — SIGNIFICANT CHANGE UP (ref 80–100)
PLATELET # BLD AUTO: 162 K/UL — SIGNIFICANT CHANGE UP (ref 150–400)
POTASSIUM SERPL-MCNC: 3.6 MMOL/L — SIGNIFICANT CHANGE UP (ref 3.5–5.3)
POTASSIUM SERPL-SCNC: 3.6 MMOL/L — SIGNIFICANT CHANGE UP (ref 3.5–5.3)
PROTHROM AB SERPL-ACNC: 24.5 SEC — HIGH (ref 10–13.1)
RBC # BLD: 4.34 M/UL — SIGNIFICANT CHANGE UP (ref 3.8–5.2)
RBC # FLD: 14.1 % — SIGNIFICANT CHANGE UP (ref 10.3–14.5)
SODIUM SERPL-SCNC: 138 MMOL/L — SIGNIFICANT CHANGE UP (ref 135–145)
WBC # BLD: 6.77 K/UL — SIGNIFICANT CHANGE UP (ref 3.8–10.5)
WBC # FLD AUTO: 6.77 K/UL — SIGNIFICANT CHANGE UP (ref 3.8–10.5)

## 2017-08-17 PROCEDURE — 72195 MRI PELVIS W/O DYE: CPT | Mod: 26

## 2017-08-17 RX ORDER — ZIPRASIDONE HYDROCHLORIDE 20 MG/1
20 CAPSULE ORAL AT BEDTIME
Qty: 0 | Refills: 0 | Status: DISCONTINUED | OUTPATIENT
Start: 2017-08-17 | End: 2017-08-18

## 2017-08-17 RX ORDER — WARFARIN SODIUM 2.5 MG/1
1 TABLET ORAL ONCE
Qty: 0 | Refills: 0 | Status: COMPLETED | OUTPATIENT
Start: 2017-08-17 | End: 2017-08-17

## 2017-08-17 RX ORDER — ACETAMINOPHEN 500 MG
650 TABLET ORAL EVERY 6 HOURS
Qty: 0 | Refills: 0 | Status: DISCONTINUED | OUTPATIENT
Start: 2017-08-17 | End: 2017-08-18

## 2017-08-17 RX ADMIN — ZIPRASIDONE HYDROCHLORIDE 20 MILLIGRAM(S): 20 CAPSULE ORAL at 22:48

## 2017-08-17 RX ADMIN — Medication 1000 UNIT(S): at 11:39

## 2017-08-17 RX ADMIN — WARFARIN SODIUM 1 MILLIGRAM(S): 2.5 TABLET ORAL at 22:51

## 2017-08-17 RX ADMIN — SERTRALINE 100 MILLIGRAM(S): 25 TABLET, FILM COATED ORAL at 11:39

## 2017-08-17 RX ADMIN — LOSARTAN POTASSIUM 50 MILLIGRAM(S): 100 TABLET, FILM COATED ORAL at 06:24

## 2017-08-17 NOTE — PHYSICAL THERAPY INITIAL EVALUATION ADULT - ADDITIONAL COMMENTS
Pt lives alone in private home with 5 stairs to enter (+) HR, first floor setup. Pt has 24/7 HHAs. Pt owns walker, shower chair.

## 2017-08-17 NOTE — PROGRESS NOTE ADULT - ASSESSMENT
86F here with daughter and an aide with c/o R posterior pelvis pain for 2 days s/p fall, witnessed by another aide who reports gentle fall but patient described fall on head, no LOC, c/o headache and posterior pain, worse with ambulation, better with rest.  Pt on coumadin for afib for prior stroke without residual weakness.    ID is consulted for low grade fever in .5.    Impression/plan:   1. one episode of low grade fever.   patient remained afebrile since, has normal WBC counts and is hemodynamically stable. U/a shows no pyuria.     ·	advise to observe off any new antibiotics.   ·	follow up final blood cultures and urine cultures.  ·	As per [facility], pt is on daily cephalexin for long term UTI suppression, that can be continued as prescribed by her PMD.     [facility] is requesting obg/gyn and urology consult for the patient. communicated to the team.    Case discussed in detail with the primary team.  Thanks for this consultation. please call me if any questions at [phone].

## 2017-08-17 NOTE — DISCHARGE NOTE ADULT - PATIENT PORTAL LINK FT
“You can access the [facility] Patient Portal, offered by [facility], by registering with the following website: [email or web address]”

## 2017-08-17 NOTE — DISCHARGE NOTE ADULT - HOSPITAL COURSE
By attending 86F here with daughter and an aide with c/o R posterior pelvis pain for 2 days s/p fall, witnessed by another aide who reports gentle fall but patient described fall on head, no LOC, c/o headache and posterior pain, worse with ambulation, better with rest.  Pt on coumadin for afib for prior stroke without residual weakness.  Pt at home with 24hr aides, uses walker. 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 The headache he describes as right frontal, aching and throbbing, 3-4/10 in intensity. Per daughter he has been less active, more sleepy in the last couple days. He has had confusion intermittently since his stroke.   Dx: Acute encephalopathy/ headache .     8/15 	CTH- : atrophy. old large right frontal infarct, old right basal  	ganglia infarct and old left parietal infarct  8/16	Neuro: rec MRI and MRA head and neck, and EEG  	Renal US: Echogenic kidneys, compatible with medical renal disease.  Mild left-sided hydronephrosis.  8/18 Neurology F/U called to clear patient for AC for LV thrombus  ID- advise to observe off any new antibiotics.   follow up final blood cultures   As per A, pt is on daily cephalexin for long term UTI suppression, that can be continued as prescribed by her PMD.   urine cx-no growth   no coumadin tonight INR-3.09   8/17   MRI plvs no Fx, resume coumadin

## 2017-08-17 NOTE — PROGRESS NOTE ADULT - PROBLEM SELECTOR PLAN 2
Rate controlled, dtr advised against Ac, but wishes to continue, understands the risk.
Rate controlled, dtr advised against Ac, but wishes to continue, understands the risk.

## 2017-08-17 NOTE — PHYSICAL THERAPY INITIAL EVALUATION ADULT - DISCHARGE DISPOSITION, PT EVAL
rehabilitation facility/Subacute rehab. **If pt to return home, recommend home PT for gait, balance, & strength training and to return pt to baseline functional mobility status. Rolling walker with ambulation (pt owns). Assist with all mobility skills at this time (pt has 24/7 HHA). **Per RAMYA Rutherford request, PT to f/u on 8/18 to reassess pt mobility after pt pre-medicated for pain

## 2017-08-17 NOTE — DISCHARGE NOTE ADULT - MEDICATION SUMMARY - MEDICATIONS TO TAKE
I will START or STAY ON the medications listed below when I get home from the hospital:    Geodon 20 mg oral capsule  -- 1 cap(s) by mouth once a day (at bedtime)  -- Indication: For Depression, psychotic    Actamin 325 mg oral tablet  -- 2 tab(s) by mouth every 6 hours, As needed,  Pain   -- Indication: For Back pain    irbesartan 150 mg oral tablet  -- 1 tab(s) by mouth once a day (in the morning)  -- Indication: For Hypertension    warfarin 2 mg oral tablet  -- 1 tab(s) by mouth 2 times a week (Monday & Wednesday)  -- Indication: For Atrial fibrillation    warfarin 1 mg oral tablet  -- 1 tab(s) by mouth 5 times a week (Tues, Thurs, Fri, Sat & Sun)  -- Indication: For Atrial fibrillation    sertraline 50 mg oral tablet  -- 1 tab(s) by mouth once a day (takes total of 150 mg per day)  -- Indication: For Depression, psychotic    sertraline 100 mg oral tablet  -- 1 tab(s) by mouth once a day (takes total of 150 mg per day)  -- Indication: For Depression, psychotic    cephalexin 250 mg oral capsule  -- 1 cap(s) by mouth once a day  -- Indication: For prophylaxis for UTI    ascorbic acid 250 mg oral tablet  -- 1 tab(s) by mouth once a day  -- Indication: For supplement    Vitamin D3 1000 intl units oral tablet  -- 1 tab(s) by mouth once a day  -- Indication: For supplement I will START or STAY ON the medications listed below when I get home from the hospital:    Geodon 20 mg oral capsule  -- 1 cap(s) by mouth once a day (at bedtime)  -- Indication: For Depression, psychotic    Tylenol 325 mg oral tablet  -- 2 tab(s) by mouth every 6 hours, As Needed  -- Indication: For pain    irbesartan 150 mg oral tablet  -- 1 tab(s) by mouth once a day (in the morning)  -- Indication: For Hypertension    warfarin 2 mg oral tablet  -- 1 tab(s) by mouth 2 times a week (Monday & Wednesday)  -- Indication: For Atrial fibrillation    warfarin 1 mg oral tablet  -- 1 tab(s) by mouth 5 times a week (Tues, Thurs, Fri, Sat & Sun)  -- Indication: For Atrial fibrillation    sertraline 50 mg oral tablet  -- 1 tab(s) by mouth once a day (takes total of 150 mg per day)  -- Indication: For Depression, psychotic    sertraline 100 mg oral tablet  -- 1 tab(s) by mouth once a day (takes total of 150 mg per day)  -- Indication: For Depression, psychotic    cephalexin 250 mg oral capsule  -- 1 cap(s) by mouth once a day  -- Indication: For prophylaxis for UTI    ascorbic acid 250 mg oral tablet  -- 1 tab(s) by mouth once a day  -- Indication: For supplement    Vitamin D3 1000 intl units oral tablet  -- 1 tab(s) by mouth once a day  -- Indication: For supplement

## 2017-08-17 NOTE — PROGRESS NOTE ADULT - SUBJECTIVE AND OBJECTIVE BOX
INTERVAL HISTORY: feels ok    	  MEDICATIONS:  losartan 50 milliGRAM(s) Oral daily        PHYSICAL EXAM:  T(C): 36.6 (08-17-17 @ 15:57), Max: 37.1 (08-16-17 @ 23:54)  HR: 102 (08-17-17 @ 16:04) (81 - 102)  BP: 152/85 (08-17-17 @ 16:04) (141/80 - 167/91)  RR: 18 (08-17-17 @ 15:57) (18 - 18)  SpO2: 94% (08-17-17 @ 16:04) (92% - 94%)  Wt(kg): --  I&O's Summary    16 Aug 2017 07:01  -  17 Aug 2017 07:00  --------------------------------------------------------  IN: 720 mL / OUT: 0 mL / NET: 720 mL    17 Aug 2017 07:01  -  17 Aug 2017 16:35  --------------------------------------------------------  IN: 240 mL / OUT: 0 mL / NET: 240 mL          Appearance: Normal	  HEENT:    PERRL, EOMI	  Lymphatic: No lymphadenopathy  Cardiovascular: Normal S1 S2, No JVD  Respiratory: Lungs clear to auscultation	  Psychiatry: Alert, Mood & affect appropriate  Gastrointestinal:  Soft, Non-tender, + BS	  Skin: No rashes, No cyanosis  Neurologic: Non-focal  Extremities:  No  cyanosis or edema  Vascular: Peripheral pulses palpable  bilaterally      CARDIAC MARKERS:                                  12.5   6.77  )-----------( 162      ( 17 Aug 2017 08:38 )             37.0     08-17    138  |  100  |  20  ----------------------------<  95  3.6   |  23  |  1.00    Ca    8.4      17 Aug 2017 08:38          ASSESSMENT/PLAN: 	  86F with h/o AF, HTN and CVA here with daughter and an aide with c/o R posterior pelvis pain for 2 days s/p fall  1. AF - I had an extensive discussion with daughter and pt about the risks and benefits of continuing AC -  risk of life threatening bleed Vs CVA ppx. At this point they wish to continue with coumadin They state her INR at home is usually well controlled and not labile    2. HTN - well controlled on Losartan    3. Fall - MRI pending  - PT tomasa Mckeon DO [facility]  Cardiovascular Medicine  [phone]

## 2017-08-17 NOTE — DISCHARGE NOTE ADULT - HOME CARE AGENCY
[facility] care  A nurse will visit the day after discharge to assess you and need for Home PT.

## 2017-08-17 NOTE — DISCHARGE NOTE ADULT - CARE PROVIDER_API CALL
Sterling Day (MARKO), Internal Medicine  [address] [place] [address]  Phone: [phone]  Fax: [phone]

## 2017-08-17 NOTE — DISCHARGE NOTE ADULT - CARE PLAN
Principal Discharge DX:	Back pain  Goal:	Resolution  Instructions for follow-up, activity and diet:	S/P fall. No acute Fx. Pain management . Fall precautions.  Secondary Diagnosis:	Atrial fibrillation  Instructions for follow-up, activity and diet:	Atrial fibrillation is the most common heart rhythm problem & has the risk of stroke & heart attack  It helps if you control your blood pressure, not drink more than 1-2 alcohol drinks per day, cut down on caffeine, getting treatment for over active thyroid gland, & getting exercise  Call your doctor if you feel your heart racing or beating unusually, chest tightness or pain, lightheaded, faint, shortness of breath especially with exercise  It is important to take your heart medication as prescribed  You may be on anticoagulation which is very important to take as directed - you may need blood work to monitor drug levels  Secondary Diagnosis:	Dementia  Instructions for follow-up, activity and diet:	Assist with ADLs and continue current management. Principal Discharge DX:	Back pain  Goal:	Resolution  Instructions for follow-up, activity and diet:	S/P fall. No acute Fx. Pain management . Fall precautions.  Secondary Diagnosis:	Atrial fibrillation  Instructions for follow-up, activity and diet:	Atrial fibrillation is the most common heart rhythm problem & has the risk of stroke & heart attack  It helps if you control your blood pressure, not drink more than 1-2 alcohol drinks per day, cut down on caffeine, getting treatment for over active thyroid gland, & getting exercise  Call your doctor if you feel your heart racing or beating unusually, chest tightness or pain, lightheaded, faint, shortness of breath especially with exercise  It is important to take your heart medication as prescribed  You may be on anticoagulation which is very important to take as directed - you may need blood work to monitor drug levels  Secondary Diagnosis:	Depression, psychotic  Instructions for follow-up, activity and diet:	continue current management.

## 2017-08-17 NOTE — PROGRESS NOTE ADULT - SUBJECTIVE AND OBJECTIVE BOX
Patient is a 86y old  Female who presents with a chief complaint of S/p fall & back pain, UTI ? (15 Aug 2017 21:39)  Patient seen and examined, daughter at bedside. Seeing things, because didn't get Geodon and can't tolerate Seroquel. Pelvic pain better.    INTERVAL HPI/OVERNIGHT EVENTS: None    Vital Signs Last 24 Hrs  T(C): 36.6 (17 Aug 2017 15:57), Max: 37.1 (16 Aug 2017 23:54)  T(F): 97.8 (17 Aug 2017 15:57), Max: 98.7 (16 Aug 2017 23:54)  HR: 102 (17 Aug 2017 16:04) (81 - 102)  BP: 152/85 (17 Aug 2017 16:04) (141/80 - 167/91)  BP(mean): --  RR: 18 (17 Aug 2017 15:57) (18 - 18)  SpO2: 94% (17 Aug 2017 16:04) (92% - 94%)      PAST MEDICAL & SURGICAL HISTORY:  IBS (irritable bowel syndrome)  Gastritis  Atrial fibrillation  CVA (cerebral infarction)  Smoking  Hypertension  No significant past surgical history      REVIEW OF SYSTEMS:  CONSTITUTIONAL: No fever, weight loss, or fatigue  EYES: No eye pain, visual disturbances, or discharge  ENMT:  No difficulty hearing, tinnitus, vertigo; No sinus or throat pain  NECK: No pain or stiffness  RESPIRATORY: No cough, wheezing, chills or hemoptysis; No shortness of breath  CARDIOVASCULAR: No chest pain, palpitations, dizziness, or leg swelling  GASTROINTESTINAL: No abdominal or epigastric pain. No nausea, vomiting, or hematemesis; No diarrhea or constipation. No melena or hematochezia.  GENITOURINARY: No dysuria, frequency, hematuria, or incontinence  NEUROLOGICAL: No headaches, memory loss, loss of strength, numbness, or tremors  SKIN: No itching, burning, rashes, or lesions   LYMPH NODES: No enlarged glands  ENDOCRINE: No heat or cold intolerance; No hair loss  MUSCULOSKELETAL: No joint pain or swelling; No extremity pain. +LBP  PSYCHIATRIC: No depression, anxiety, mood swings, or difficulty sleeping  HEME/LYMPH: No easy bruising, or bleeding gums  ALLERY AND IMMUNOLOGIC: No hives or eczema    RADIOLOGY & ADDITIONAL TESTS:    Imaging Personally Reviewed:  [ ] YES  [ ] NO    Consultant(s) Notes Reviewed:  [+ ] YES  [ ] NO    PHYSICAL EXAM:  GENERAL: NAD, well-groomed, well-developed  HEAD:  Atraumatic, Normocephalic  EYES: EOMI, PERRLA, conjunctiva and sclera clear  ENMT: No tonsillar erythema, exudates, or enlargement; Moist mucous membranes, Good dentition, No lesions  NECK: Supple, No JVD, Normal thyroid  NERVOUS SYSTEM:  Alert & Oriented X3, Good concentration; Motor Strength 5/5 B/L upper and lower extremities; DTRs 2+ intact and symmetric  CHEST/LUNG: Clear to percussion bilaterally; No rales, rhonchi, wheezing, or rubs  HEART: Regular rate and rhythm; No murmurs, rubs, or gallops  ABDOMEN: Soft, Nontender, Nondistended; Bowel sounds present  EXTREMITIES:  2+ Peripheral Pulses, No clubbing, cyanosis, or edema  LYMPH: No lymphadenopathy noted  SKIN: No rashes or lesions    LABS:                                   12.5   6.77  )-----------( 162      ( 17 Aug 2017 08:38 )             37.0   08-17    138  |  100  |  20  ----------------------------<  95  3.6   |  23  |  1.00    Ca    8.4      17 Aug 2017 08:38        MEDICATIONS  (STANDING):  losartan 50 milliGRAM(s) Oral daily  sertraline 100 milliGRAM(s) Oral daily  sertraline 50 milliGRAM(s) Oral daily  cholecalciferol 1000 Unit(s) Oral daily  cephalexin 250 milliGRAM(s) Oral daily  QUEtiapine 25 milliGRAM(s) Oral at bedtime    MEDICATIONS  (PRN):      Care Discussed with Consultants/Other Providers [ ] YES  [ ] NO

## 2017-08-17 NOTE — PHYSICAL THERAPY INITIAL EVALUATION ADULT - PLANNED THERAPY INTERVENTIONS, PT EVAL
balance training/gait training/bed mobility training/transfer training/strengthening/postural re-education

## 2017-08-17 NOTE — PROGRESS NOTE ADULT - SUBJECTIVE AND OBJECTIVE BOX
Patient seen and examined.   afebrile.   no acute symptoms     MEDICATIONS  (STANDING):  losartan 50 milliGRAM(s) Oral daily  sertraline 100 milliGRAM(s) Oral daily  sertraline 50 milliGRAM(s) Oral daily  cholecalciferol 1000 Unit(s) Oral daily  cephalexin 250 milliGRAM(s) Oral daily  QUEtiapine 25 milliGRAM(s) Oral at bedtime      MEDICATIONS  (PRN):       Medications up to date at time of exam.      PHYSICAL EXAMINATION:      Vital Signs Last 24 Hrs  T(C): 37.1 (16 Aug 2017 23:54), Max: 37.1 (16 Aug 2017 23:54)  T(F): 98.7 (16 Aug 2017 23:54), Max: 98.7 (16 Aug 2017 23:54)  HR: 90 (16 Aug 2017 23:54) (90 - 98)  BP: 141/80 (16 Aug 2017 23:54) (110/65 - 148/76)  BP(mean): --  RR: 18 (16 Aug 2017 23:54) (18 - 18)  SpO2: 92% (16 Aug 2017 23:54) (92% - 95%)   (if applicable)    GENERAL: in no apparent distress.     HEENT: Head is normocephalic and atraumatic. Extraocular muscles are intact. Mucous membranes are moist.     NECK: Supple, no palpable adenopathy.    LUNGS: Clear to auscultation, no wheezing, rales, or rhonchi.    HEART: Regular rate and rhythm without murmur.    ABDOMEN: Soft, nontender, and nondistended.  No hepatosplenomegaly is noted. inspection of genital area normal.     EXTREMITIES: Without any cyanosis, clubbing, rash, lesions or edema.    NEUROLOGIC: Awake, alert,     SKIN: Warm, dry, good turgor.      LABS:                        14.7   9.7   )-----------( 149      ( 15 Aug 2017 14:18 )             43.6     08-15    141  |  101  |  17  ----------------------------<  121<H>  4.0   |  26  |  1.08    Ca    9.3      15 Aug 2017 14:18    TPro  7.9  /  Alb  4.5  /  TBili  0.7  /  DBili  x   /  AST  20  /  ALT  13  /  AlkPhos  96  08-15    PT/INR - ( 16 Aug 2017 16:30 )   PT: 34.5 sec;   INR: 3.09 ratio         PTT - ( 15 Aug 2017 14:18 )  PTT:45.6 sec  Urinalysis Basic - ( 15 Aug 2017 15:23 )    Color: Yellow / Appearance: Clear / S.016 / pH: x  Gluc: x / Ketone: Negative  / Bili: Negative / Urobili: Negative   Blood: x / Protein: 30 mg/dL / Nitrite: Negative   Leuk Esterase: Negative / RBC: 0-2 /HPF / WBC 3-5 /HPF   Sq Epi: x / Non Sq Epi: x / Bacteria: Few /HPF            Culture - Blood (08.15.17 @ 22:54)    Specimen Source: .Blood Blood    Culture Results:   No growth to date.      Culture - Blood (08.15.17 @ 22:15)    Specimen Source: .Blood Blood    Culture Results:   No growth to date.    Culture - Urine (08.15.17 @ 17:30)    Specimen Source: .Urine Catheterized    Culture Results:   No growth    < from: Xray Chest 1 View AP/PA (08.15.17 @ 19:29) >  IMPRESSION:   Clear lungs.     < end of copied text >    < from: CT 3D Reconstruct w/ Workstation (08.15.17 @ 14:41) >  IMPRESSION:    Status post total left hip arthroplasty. No evidence of acute   periprosthetic fracture.    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    No acute fracture identified in the left hip, right hip, or throughout   the bony pelvis.    Nonspecific left adnexal mass which is stable since the prior CT scan of   2013    < end of copied text >      culture  MICROBIOLOGY: (if applicable)    RADIOLOGY & ADDITIONAL STUDIES:  EKG:   CXR:  ECHO:      RECOMMENDATIONS:

## 2017-08-17 NOTE — PROVIDER CONTACT NOTE (OTHER) - SITUATION
Mild erythema on right breast/chest area. No blistering. Skin remains intact. Pt denies pain at site.

## 2017-08-17 NOTE — PHYSICAL THERAPY INITIAL EVALUATION ADULT - PERTINENT HX OF CURRENT PROBLEM, REHAB EVAL
86F here with daughter and an aide with c/o R posterior pelvis pain for 2 days s/p fall, witnessed by another aide who reports gentle fall but patient described fall on head, no LOC, c/o headache and posterior pain, worse with ambulation, better with rest. 86F here with daughter and an aide with c/o R posterior pelvis pain for 2 days s/p fall, witnessed by another aide who reports gentle fall but patient described fall on head, no LOC, c/o headache and posterior pain, worse with ambulation, better with rest. CT pelvis (8/15): (-) fx L/R hips or pelvis, chronic post. mild deformity of L sup. & inf. pubic rami. MRI pelvis (8/17): (-) fx

## 2017-08-17 NOTE — PROVIDER CONTACT NOTE (OTHER) - ACTION/TREATMENT ORDERED:
Pt denies pain. Skin intact, no blistering. Skin cleaned with cool water. No other intervention at this time.

## 2017-08-17 NOTE — DISCHARGE NOTE ADULT - PLAN OF CARE
Resolution S/P fall. No acute Fx. Pain management . Fall precautions. Atrial fibrillation is the most common heart rhythm problem & has the risk of stroke & heart attack  It helps if you control your blood pressure, not drink more than 1-2 alcohol drinks per day, cut down on caffeine, getting treatment for over active thyroid gland, & getting exercise  Call your doctor if you feel your heart racing or beating unusually, chest tightness or pain, lightheaded, faint, shortness of breath especially with exercise  It is important to take your heart medication as prescribed  You may be on anticoagulation which is very important to take as directed - you may need blood work to monitor drug levels Assist with ADLs and continue current management. continue current management.

## 2017-08-18 VITALS
TEMPERATURE: 98 F | RESPIRATION RATE: 18 BRPM | DIASTOLIC BLOOD PRESSURE: 72 MMHG | SYSTOLIC BLOOD PRESSURE: 102 MMHG | OXYGEN SATURATION: 93 % | HEART RATE: 98 BPM

## 2017-08-18 LAB
INR BLD: 2.02 RATIO — HIGH (ref 0.88–1.16)
PROTHROM AB SERPL-ACNC: 22.1 SEC — HIGH (ref 9.8–12.7)

## 2017-08-18 RX ORDER — CEPHALEXIN 500 MG
1 CAPSULE ORAL
Qty: 0 | Refills: 0 | DISCHARGE
Start: 2017-08-18

## 2017-08-18 RX ORDER — WARFARIN SODIUM 2.5 MG/1
2 TABLET ORAL ONCE
Qty: 0 | Refills: 0 | Status: DISCONTINUED | OUTPATIENT
Start: 2017-08-18 | End: 2017-08-18

## 2017-08-18 RX ORDER — WARFARIN SODIUM 2.5 MG/1
1 TABLET ORAL ONCE
Qty: 0 | Refills: 0 | Status: DISCONTINUED | OUTPATIENT
Start: 2017-08-18 | End: 2017-08-18

## 2017-08-18 RX ORDER — ACETAMINOPHEN 500 MG
2 TABLET ORAL
Qty: 0 | Refills: 0 | COMMUNITY
Start: 2017-08-18

## 2017-08-18 RX ADMIN — Medication 1000 UNIT(S): at 11:12

## 2017-08-18 RX ADMIN — SERTRALINE 50 MILLIGRAM(S): 25 TABLET, FILM COATED ORAL at 11:07

## 2017-08-18 RX ADMIN — Medication 250 MILLIGRAM(S): at 11:12

## 2017-08-18 RX ADMIN — LOSARTAN POTASSIUM 50 MILLIGRAM(S): 100 TABLET, FILM COATED ORAL at 11:10

## 2017-08-18 RX ADMIN — SERTRALINE 100 MILLIGRAM(S): 25 TABLET, FILM COATED ORAL at 11:07

## 2017-08-18 NOTE — PROGRESS NOTE ADULT - ASSESSMENT
86F here with daughter and an aide with c/o R posterior pelvis pain for 2 days s/p fall, witnessed by another aide who reports gentle fall but patient described fall on head, no LOC, c/o headache and posterior pain, worse with ambulation, better with rest.  Pt on coumadin for afib for prior stroke without residual weakness.    ID is consulted for low grade fever in .5.    Impression/plan:   1.  patient remained afebrile since, has normal WBC counts and is hemodynamically stable. U/a shows no pyuria. blood and urine cultures no growth.     observe clinically closley.  ·	As per HHA, pt is on daily cephalexin for long term UTI suppression, that can be continued as prescribed by her PMD.     A again requesting obg/gyn and urology consult for the patient. I was communicated to the primary team.    Case discussed in detail with the primary team.

## 2017-08-18 NOTE — PROGRESS NOTE ADULT - SUBJECTIVE AND OBJECTIVE BOX
outpt follow up with her cardiologist in 1-2 weeks INTERVAL HISTORY: Awaiting discharge    	  MEDICATIONS:  losartan 50 milliGRAM(s) Oral daily        PHYSICAL EXAM:  T(C): 36.5 (08-18-17 @ 15:25), Max: 36.7 (08-17-17 @ 23:46)  HR: 98 (08-18-17 @ 15:25) (71 - 98)  BP: 102/72 (08-18-17 @ 15:25) (102/72 - 146/80)  RR: 18 (08-18-17 @ 15:25) (18 - 18)  SpO2: 93% (08-18-17 @ 15:25) (93% - 95%)  Wt(kg): --  I&O's Summary    17 Aug 2017 07:01  -  18 Aug 2017 07:00  --------------------------------------------------------  IN: 240 mL / OUT: 0 mL / NET: 240 mL    18 Aug 2017 07:01  -  18 Aug 2017 16:11  --------------------------------------------------------  IN: 300 mL / OUT: 0 mL / NET: 300 mL          Appearance: Normal	  HEENT:    PERRL, EOMI	  Lymphatic: No lymphadenopathy  Cardiovascular: Normal S1 S2, No JVD  Respiratory: Lungs clear to auscultation	  Psychiatry: Alert, Mood & affect appropriate  Gastrointestinal:  Soft, Non-tender, + BS	  Skin: No rashes, No cyanosis  Neurologic: Non-focal  Extremities:  No  cyanosis or edema  Vascular: Peripheral pulses palpable  bilaterally      CARDIAC MARKERS:                                  12.5   6.77  )-----------( 162      ( 17 Aug 2017 08:38 )             37.0     08-17    138  |  100  |  20  ----------------------------<  95  3.6   |  23  |  1.00    Ca    8.4      17 Aug 2017 08:38      MRI Pelvis Bony Only w/o Cont (08.17.17 @ 14:27) >  IMPRESSION:   No acute fracture or dislocation.        ASSESSMENT/PLAN: 	  86F with h/o AF, HTN and CVA here with daughter and an aide with c/o R posterior pelvis pain for 2 days s/p fall  1. AF - I had an extensive discussion with daughter and pt about the risks and benefits of continuing AC -  risk of life threatening bleed Vs CVA ppx. At this point they wish to continue with coumadin They state her INR at home is usually well controlled and not labile    2. HTN - well controlled on Losartan    3. Fall - MRI with no fracture or dislocation    4. Discharge home with outpt follow up with her cardiologist within 2 weeks         Milton Mckeon DO [facility]  Cardiovascular Medicine  [phone]

## 2017-08-18 NOTE — PROGRESS NOTE ADULT - SUBJECTIVE AND OBJECTIVE BOX
Patient seen and examined.   pt is little angry today" every one is trying to kill me"  otherwise no acute distress.     MEDICATIONS  (STANDING):  losartan 50 milliGRAM(s) Oral daily  sertraline 100 milliGRAM(s) Oral daily  sertraline 50 milliGRAM(s) Oral daily  cholecalciferol 1000 Unit(s) Oral daily  cephalexin 250 milliGRAM(s) Oral daily  ziprasidone 20 milliGRAM(s) Oral at bedtime      MEDICATIONS  (PRN):  acetaminophen   Tablet. 650 milliGRAM(s) Oral every 6 hours PRN Moderate Pain (4 - 6)       Medications up to date at time of exam.      PHYSICAL EXAMINATION:      Vital Signs Last 24 Hrs  T(C): 36.7 (17 Aug 2017 23:46), Max: 36.9 (17 Aug 2017 08:15)  T(F): 98.1 (17 Aug 2017 23:46), Max: 98.4 (17 Aug 2017 08:15)  HR: 71 (18 Aug 2017 06:20) (71 - 102)  BP: 132/84 (18 Aug 2017 06:20) (132/84 - 167/91)  BP(mean): --  RR: 18 (17 Aug 2017 23:46) (18 - 18)  SpO2: 95% (17 Aug 2017 23:46) (94% - 95%)   (if applicable)  GENERAL: in no apparent distress.     HEENT: Head is normocephalic and atraumatic.     NECK: no new finding.    LUNGS: Clear to auscultation, no wheezing, rales, or rhonchi.    HEART: Regular rate and rhythm without murmur.    ABDOMEN: Soft, nontender, and nondistended.      EXTREMITIES: Without any cyanosis, clubbing, rash, lesions or edema.    NEUROLOGIC: Awake    SKIN: Warm, dry, good turgor.      LABS:                        12.5   6.77  )-----------( 162      ( 17 Aug 2017 08:38 )             37.0     08-17    138  |  100  |  20  ----------------------------<  95  3.6   |  23  |  1.00    Ca    8.4      17 Aug 2017 08:38      PT/INR - ( 17 Aug 2017 08:38 )   PT: 24.5 sec;   INR: 2.13 ratio         Culture - Blood (08.15.17 @ 22:54)    Specimen Source: .Blood Blood    Culture Results:   No growth to date.          Culture - Blood (08.15.17 @ 22:15)    Specimen Source: .Blood Blood    Culture Results:   No growth to date.        Culture - Urine (08.15.17 @ 17:30)    Specimen Source: .Urine Catheterized    Culture Results:   No growth      < from: MRI Pelvis Bony Only w/o Cont (08.17.17 @ 14:27) >  IMPRESSION:   No acute fracture or dislocation.    < end of copied text >    < from: Xray Chest 1 View AP/PA (08.15.17 @ 19:29) >    IMPRESSION:   Clear lungs.     < end of copied text >        culture  MICROBIOLOGY: (if applicable)    RADIOLOGY & ADDITIONAL STUDIES:  EKG:   CXR:  ECHO:      RECOMMENDATIONS:

## 2017-08-20 LAB
CULTURE RESULTS: SIGNIFICANT CHANGE UP
CULTURE RESULTS: SIGNIFICANT CHANGE UP
SPECIMEN SOURCE: SIGNIFICANT CHANGE UP
SPECIMEN SOURCE: SIGNIFICANT CHANGE UP

## 2017-10-19 PROCEDURE — 70450 CT HEAD/BRAIN W/O DYE: CPT

## 2017-10-19 PROCEDURE — 80048 BASIC METABOLIC PNL TOTAL CA: CPT

## 2017-10-19 PROCEDURE — 85610 PROTHROMBIN TIME: CPT

## 2017-10-19 PROCEDURE — 80053 COMPREHEN METABOLIC PANEL: CPT

## 2017-10-19 PROCEDURE — 71045 X-RAY EXAM CHEST 1 VIEW: CPT

## 2017-10-19 PROCEDURE — 87086 URINE CULTURE/COLONY COUNT: CPT

## 2017-10-19 PROCEDURE — 72192 CT PELVIS W/O DYE: CPT

## 2017-10-19 PROCEDURE — 76377 3D RENDER W/INTRP POSTPROCES: CPT

## 2017-10-19 PROCEDURE — 51701 INSERT BLADDER CATHETER: CPT

## 2017-10-19 PROCEDURE — 86140 C-REACTIVE PROTEIN: CPT

## 2017-10-19 PROCEDURE — 99285 EMERGENCY DEPT VISIT HI MDM: CPT | Mod: 25

## 2017-10-19 PROCEDURE — 81001 URINALYSIS AUTO W/SCOPE: CPT

## 2017-10-19 PROCEDURE — 85730 THROMBOPLASTIN TIME PARTIAL: CPT

## 2017-10-19 PROCEDURE — 72195 MRI PELVIS W/O DYE: CPT

## 2017-10-19 PROCEDURE — 87040 BLOOD CULTURE FOR BACTERIA: CPT

## 2017-10-19 PROCEDURE — 85027 COMPLETE CBC AUTOMATED: CPT

## 2017-10-19 PROCEDURE — 96374 THER/PROPH/DIAG INJ IV PUSH: CPT | Mod: XU

## 2017-10-19 PROCEDURE — 85652 RBC SED RATE AUTOMATED: CPT

## 2017-10-19 PROCEDURE — 97162 PT EVAL MOD COMPLEX 30 MIN: CPT

## 2017-10-25 ENCOUNTER — RESULT REVIEW (OUTPATIENT)
Age: 82
End: 2017-10-25

## 2018-03-27 ENCOUNTER — OUTPATIENT (OUTPATIENT)
Dept: OUTPATIENT SERVICES | Facility: HOSPITAL | Age: 83
LOS: 1 days | Discharge: HOME | End: 2018-03-27
Payer: MEDICARE

## 2018-03-27 PROCEDURE — 90792 PSYCH DIAG EVAL W/MED SRVCS: CPT

## 2018-03-28 DIAGNOSIS — F32.3 MAJOR DEPRESSIVE DISORDER, SINGLE EPISODE, SEVERE WITH PSYCHOTIC FEATURES: ICD-10-CM

## 2018-03-28 DIAGNOSIS — F03.90 UNSPECIFIED DEMENTIA WITHOUT BEHAVIORAL DISTURBANCE: ICD-10-CM

## 2018-04-23 PROCEDURE — 99213 OFFICE O/P EST LOW 20 MIN: CPT

## 2018-04-24 DIAGNOSIS — F03.91 UNSPECIFIED DEMENTIA WITH BEHAVIORAL DISTURBANCE: ICD-10-CM

## 2018-05-14 PROCEDURE — 99213 OFFICE O/P EST LOW 20 MIN: CPT

## 2018-06-04 ENCOUNTER — TRANSCRIPTION ENCOUNTER (OUTPATIENT)
Age: 83
End: 2018-06-04

## 2018-06-04 ENCOUNTER — INPATIENT (INPATIENT)
Facility: HOSPITAL | Age: 83
LOS: 1 days | Discharge: HOME | DRG: 69 | End: 2018-06-06
Attending: PSYCHIATRY & NEUROLOGY | Admitting: PSYCHIATRY & NEUROLOGY
Payer: MEDICARE

## 2018-06-04 VITALS
HEART RATE: 97 BPM | DIASTOLIC BLOOD PRESSURE: 106 MMHG | SYSTOLIC BLOOD PRESSURE: 175 MMHG | TEMPERATURE: 97 F | RESPIRATION RATE: 20 BRPM | OXYGEN SATURATION: 99 %

## 2018-06-04 DIAGNOSIS — R47.81 SLURRED SPEECH: ICD-10-CM

## 2018-06-04 LAB
ALBUMIN SERPL ELPH-MCNC: 4.4 G/DL — SIGNIFICANT CHANGE UP (ref 3.3–5)
ALP SERPL-CCNC: 103 U/L — SIGNIFICANT CHANGE UP (ref 40–120)
ALT FLD-CCNC: 12 U/L — SIGNIFICANT CHANGE UP (ref 10–45)
ANION GAP SERPL CALC-SCNC: 12 MMOL/L — SIGNIFICANT CHANGE UP (ref 5–17)
APPEARANCE UR: ABNORMAL
APTT BLD: 41.1 SEC — HIGH (ref 27.5–37.4)
AST SERPL-CCNC: 18 U/L — SIGNIFICANT CHANGE UP (ref 10–40)
BACTERIA # UR AUTO: ABNORMAL /HPF
BASE EXCESS BLDV CALC-SCNC: 1.2 MMOL/L — SIGNIFICANT CHANGE UP (ref -2–2)
BASOPHILS # BLD AUTO: 0 K/UL — SIGNIFICANT CHANGE UP (ref 0–0.2)
BASOPHILS NFR BLD AUTO: 0.1 % — SIGNIFICANT CHANGE UP (ref 0–2)
BILIRUB SERPL-MCNC: 0.4 MG/DL — SIGNIFICANT CHANGE UP (ref 0.2–1.2)
BILIRUB UR-MCNC: NEGATIVE — SIGNIFICANT CHANGE UP
BUN SERPL-MCNC: 26 MG/DL — HIGH (ref 7–23)
CA-I SERPL-SCNC: 1.23 MMOL/L — SIGNIFICANT CHANGE UP (ref 1.12–1.3)
CALCIUM SERPL-MCNC: 10.2 MG/DL — SIGNIFICANT CHANGE UP (ref 8.4–10.5)
CHLORIDE BLDV-SCNC: 107 MMOL/L — SIGNIFICANT CHANGE UP (ref 96–108)
CHLORIDE SERPL-SCNC: 104 MMOL/L — SIGNIFICANT CHANGE UP (ref 96–108)
CO2 BLDV-SCNC: 29 MMOL/L — SIGNIFICANT CHANGE UP (ref 22–30)
CO2 SERPL-SCNC: 25 MMOL/L — SIGNIFICANT CHANGE UP (ref 22–31)
COLOR SPEC: YELLOW — SIGNIFICANT CHANGE UP
COMMENT - URINE: SIGNIFICANT CHANGE UP
CREAT SERPL-MCNC: 1.39 MG/DL — HIGH (ref 0.5–1.3)
DIFF PNL FLD: NEGATIVE — SIGNIFICANT CHANGE UP
EOSINOPHIL # BLD AUTO: 0.1 K/UL — SIGNIFICANT CHANGE UP (ref 0–0.5)
EOSINOPHIL NFR BLD AUTO: 1.1 % — SIGNIFICANT CHANGE UP (ref 0–6)
EPI CELLS # UR: SIGNIFICANT CHANGE UP /HPF
GAS PNL BLDV: 145 MMOL/L — SIGNIFICANT CHANGE UP (ref 136–145)
GAS PNL BLDV: SIGNIFICANT CHANGE UP
GAS PNL BLDV: SIGNIFICANT CHANGE UP
GLUCOSE BLDV-MCNC: 76 MG/DL — SIGNIFICANT CHANGE UP (ref 70–99)
GLUCOSE SERPL-MCNC: 88 MG/DL — SIGNIFICANT CHANGE UP (ref 70–99)
GLUCOSE UR QL: NEGATIVE — SIGNIFICANT CHANGE UP
HCO3 BLDV-SCNC: 27 MMOL/L — SIGNIFICANT CHANGE UP (ref 21–29)
HCT VFR BLD CALC: 40.7 % — SIGNIFICANT CHANGE UP (ref 34.5–45)
HCT VFR BLDA CALC: 39 % — SIGNIFICANT CHANGE UP (ref 39–50)
HGB BLD CALC-MCNC: 12.8 G/DL — SIGNIFICANT CHANGE UP (ref 11.5–15.5)
HGB BLD-MCNC: 13.6 G/DL — SIGNIFICANT CHANGE UP (ref 11.5–15.5)
INR BLD: 1.95 RATIO — HIGH (ref 0.88–1.16)
KETONES UR-MCNC: NEGATIVE — SIGNIFICANT CHANGE UP
LACTATE BLDV-MCNC: 1.5 MMOL/L — SIGNIFICANT CHANGE UP (ref 0.7–2)
LEUKOCYTE ESTERASE UR-ACNC: ABNORMAL
LYMPHOCYTES # BLD AUTO: 1 K/UL — SIGNIFICANT CHANGE UP (ref 1–3.3)
LYMPHOCYTES # BLD AUTO: 11.6 % — LOW (ref 13–44)
MCHC RBC-ENTMCNC: 30 PG — SIGNIFICANT CHANGE UP (ref 27–34)
MCHC RBC-ENTMCNC: 33.5 GM/DL — SIGNIFICANT CHANGE UP (ref 32–36)
MCV RBC AUTO: 89.7 FL — SIGNIFICANT CHANGE UP (ref 80–100)
MONOCYTES # BLD AUTO: 0.5 K/UL — SIGNIFICANT CHANGE UP (ref 0–0.9)
MONOCYTES NFR BLD AUTO: 6 % — SIGNIFICANT CHANGE UP (ref 2–14)
NEUTROPHILS # BLD AUTO: 7 K/UL — SIGNIFICANT CHANGE UP (ref 1.8–7.4)
NEUTROPHILS NFR BLD AUTO: 81.2 % — HIGH (ref 43–77)
NITRITE UR-MCNC: NEGATIVE — SIGNIFICANT CHANGE UP
PCO2 BLDV: 51 MMHG — HIGH (ref 35–50)
PH BLDV: 7.34 — LOW (ref 7.35–7.45)
PH UR: 7 — SIGNIFICANT CHANGE UP (ref 5–8)
PLATELET # BLD AUTO: 209 K/UL — SIGNIFICANT CHANGE UP (ref 150–400)
PO2 BLDV: 27 MMHG — SIGNIFICANT CHANGE UP (ref 25–45)
POTASSIUM BLDV-SCNC: 5.4 MMOL/L — HIGH (ref 3.5–5)
POTASSIUM SERPL-MCNC: 4.4 MMOL/L — SIGNIFICANT CHANGE UP (ref 3.5–5.3)
POTASSIUM SERPL-SCNC: 4.4 MMOL/L — SIGNIFICANT CHANGE UP (ref 3.5–5.3)
PROT SERPL-MCNC: 8.1 G/DL — SIGNIFICANT CHANGE UP (ref 6–8.3)
PROT UR-MCNC: SIGNIFICANT CHANGE UP
PROTHROM AB SERPL-ACNC: 21.5 SEC — HIGH (ref 9.8–12.7)
RBC # BLD: 4.54 M/UL — SIGNIFICANT CHANGE UP (ref 3.8–5.2)
RBC # FLD: 13.9 % — SIGNIFICANT CHANGE UP (ref 10.3–14.5)
RBC CASTS # UR COMP ASSIST: SIGNIFICANT CHANGE UP /HPF (ref 0–2)
SAO2 % BLDV: 43 % — LOW (ref 67–88)
SODIUM SERPL-SCNC: 141 MMOL/L — SIGNIFICANT CHANGE UP (ref 135–145)
SP GR SPEC: 1.02 — SIGNIFICANT CHANGE UP (ref 1.01–1.02)
TROPONIN T SERPL-MCNC: <0.01 NG/ML — SIGNIFICANT CHANGE UP (ref 0–0.06)
UROBILINOGEN FLD QL: NEGATIVE — SIGNIFICANT CHANGE UP
WBC # BLD: 8.6 K/UL — SIGNIFICANT CHANGE UP (ref 3.8–10.5)
WBC # FLD AUTO: 8.6 K/UL — SIGNIFICANT CHANGE UP (ref 3.8–10.5)
WBC UR QL: ABNORMAL /HPF (ref 0–5)

## 2018-06-04 PROCEDURE — 99291 CRITICAL CARE FIRST HOUR: CPT

## 2018-06-04 PROCEDURE — 93010 ELECTROCARDIOGRAM REPORT: CPT

## 2018-06-04 PROCEDURE — 70450 CT HEAD/BRAIN W/O DYE: CPT | Mod: 26

## 2018-06-04 PROCEDURE — 99223 1ST HOSP IP/OBS HIGH 75: CPT

## 2018-06-04 RX ORDER — WARFARIN SODIUM 2.5 MG/1
1 TABLET ORAL
Qty: 0 | Refills: 0 | Status: DISCONTINUED | OUTPATIENT
Start: 2018-06-04 | End: 2018-06-06

## 2018-06-04 RX ORDER — NITROFURANTOIN MACROCRYSTAL 50 MG
100 CAPSULE ORAL
Qty: 0 | Refills: 0 | Status: DISCONTINUED | OUTPATIENT
Start: 2018-06-04 | End: 2018-06-04

## 2018-06-04 RX ORDER — WARFARIN SODIUM 2.5 MG/1
1 TABLET ORAL
Qty: 0 | Refills: 0 | COMMUNITY

## 2018-06-04 RX ORDER — CEFTRIAXONE 500 MG/1
INJECTION, POWDER, FOR SOLUTION INTRAMUSCULAR; INTRAVENOUS
Qty: 0 | Refills: 0 | Status: DISCONTINUED | OUTPATIENT
Start: 2018-06-04 | End: 2018-06-04

## 2018-06-04 RX ORDER — CEFTRIAXONE 500 MG/1
1 INJECTION, POWDER, FOR SOLUTION INTRAMUSCULAR; INTRAVENOUS EVERY 24 HOURS
Qty: 0 | Refills: 0 | Status: DISCONTINUED | OUTPATIENT
Start: 2018-06-05 | End: 2018-06-05

## 2018-06-04 RX ORDER — ASPIRIN/CALCIUM CARB/MAGNESIUM 324 MG
81 TABLET ORAL ONCE
Qty: 0 | Refills: 0 | Status: COMPLETED | OUTPATIENT
Start: 2018-06-04 | End: 2018-06-04

## 2018-06-04 RX ORDER — CEFTRIAXONE 500 MG/1
1 INJECTION, POWDER, FOR SOLUTION INTRAMUSCULAR; INTRAVENOUS ONCE
Qty: 0 | Refills: 0 | Status: COMPLETED | OUTPATIENT
Start: 2018-06-04 | End: 2018-06-04

## 2018-06-04 RX ORDER — ACETAMINOPHEN 500 MG
650 TABLET ORAL EVERY 6 HOURS
Qty: 0 | Refills: 0 | Status: DISCONTINUED | OUTPATIENT
Start: 2018-06-04 | End: 2018-06-06

## 2018-06-04 RX ORDER — WARFARIN SODIUM 2.5 MG/1
2 TABLET ORAL
Qty: 0 | Refills: 0 | Status: DISCONTINUED | OUTPATIENT
Start: 2018-06-04 | End: 2018-06-06

## 2018-06-04 RX ORDER — ASCORBIC ACID 60 MG
250 TABLET,CHEWABLE ORAL DAILY
Qty: 0 | Refills: 0 | Status: DISCONTINUED | OUTPATIENT
Start: 2018-06-04 | End: 2018-06-06

## 2018-06-04 RX ORDER — CHOLECALCIFEROL (VITAMIN D3) 125 MCG
1000 CAPSULE ORAL DAILY
Qty: 0 | Refills: 0 | Status: DISCONTINUED | OUTPATIENT
Start: 2018-06-04 | End: 2018-06-06

## 2018-06-04 RX ORDER — CEFTRIAXONE 500 MG/1
INJECTION, POWDER, FOR SOLUTION INTRAMUSCULAR; INTRAVENOUS
Qty: 0 | Refills: 0 | Status: DISCONTINUED | OUTPATIENT
Start: 2018-06-04 | End: 2018-06-05

## 2018-06-04 RX ORDER — OLANZAPINE 15 MG/1
10 TABLET, FILM COATED ORAL DAILY
Qty: 0 | Refills: 0 | Status: DISCONTINUED | OUTPATIENT
Start: 2018-06-04 | End: 2018-06-06

## 2018-06-04 RX ADMIN — Medication 1000 UNIT(S): at 21:56

## 2018-06-04 RX ADMIN — Medication 250 MILLIGRAM(S): at 21:56

## 2018-06-04 RX ADMIN — Medication 81 MILLIGRAM(S): at 12:28

## 2018-06-04 RX ADMIN — CEFTRIAXONE 100 GRAM(S): 500 INJECTION, POWDER, FOR SOLUTION INTRAMUSCULAR; INTRAVENOUS at 16:33

## 2018-06-04 RX ADMIN — OLANZAPINE 10 MILLIGRAM(S): 15 TABLET, FILM COATED ORAL at 21:56

## 2018-06-04 RX ADMIN — WARFARIN SODIUM 1 MILLIGRAM(S): 2.5 TABLET ORAL at 21:56

## 2018-06-04 NOTE — H&P ADULT - ATTENDING COMMENTS
86-year-old right-handed white lady first evaluated at [facility] on 6/4/18 with acute speech disturbance. She has a history of a atrial fibrillation, on Coumadin. In 2013, she had an episode of speech disturbance, diagnosed as probable aphasia which subsequently resolved. On 6/4/18, she had a similar episode of speech disturbance, "not making sense". There was associated severe anxiety, she complained that she "couldn't see", and shaking of both arms with a normal level of consciousness. She had a recent UTI. She has severe anxiety and depression, and her psychiatrist has recently changed her medications which according to her daughters have resulted in significantly worsened anxiety. At baseline, she is primarily wheelchair bound, and can take a couple of steps with assistance. ROS otherwise negative. Exam. Alert and attentive; speech fluent, prosodic, without paraphasias and she followed all simple commands; oriented x3 except that she said that the year was "1973"; slight flattening of left nasolabial fold; moves all extremities against gravity, although both legs proximally were 3 - to 3/5 at best (probably baseline); remainder of neurologic exam is nonfocal. CT head (6/4/18) to my eye showed chronic lucencies in the bilateral paramedian basil, perhaps chronic ischemic changes, and moderate periventricular chronic ischemic changes. INR (6/4/18) = 2. Impression. In 2013 she had an episode of possible or probable aphasia. On 6/4/18 she had a recurrent, possibly similar episode with subsequent return to baseline. Her clinical syndrome is consistent with left hemispheric dysfunction, possibly or probably cerebral ischemia due to cardioembolism, related to atrial fibrillation, although the stereotypic nature of the episodes is somewhat atypical for cardioembolism. Despite the bilateral arm shaking, doubt that she had a seizure. There may have been significant associated anxiety, but I doubt that this was the primary cause of the episode. She has also recently had some type of decompensation in her chronic anxiety/depression with recent changes in her medication. Discussed at length with her daughters, and we agreed that she will not benefit from an extensive hospital stay. Plan. MRI brain/MRA neck and head; TTE to check valves if not recently done; consider changing Coumadin to apixaban when the INR is less than 1.5; psychiatry consultation; emphasis to be on discharge planning.

## 2018-06-04 NOTE — ED PROVIDER NOTE - MEDICAL DECISION MAKING DETAILS
Patient made code stroke shortly after arrival, however metabolic and infectious etiologies are also on differential, on top of code stroke workup will also obtain UA, pan culture, likely admission to medicine with neurology consultation given diagnostic uncertainty.

## 2018-06-04 NOTE — ED PROVIDER NOTE - PHYSICAL EXAMINATION
Neuro:  Slight L facial droop (per daughters appears at baseline), some slurring of speech but intelligible, slow to answer, normal strength in all extremities, no drift

## 2018-06-04 NOTE — DISCHARGE NOTE ADULT - PLAN OF CARE
improve functionality MRI negative, not a stroke   follow up with your PCP within one week MRI negative, not a stroke   follow up with your PCP within one week, continue taking your medications MRI negative   follow up with your PCP within one week, continue taking your medications  consider changing to Eliquis after outpatient TTE

## 2018-06-04 NOTE — DISCHARGE NOTE ADULT - NS AS DC FOLLOWUP STROKE INST
Stroke (includes: TIA/SAH/ICH/Ischemic Stroke) Coumadin/Warfarin Stroke (includes: TIA/SAH/ICH/Ischemic Stroke)/Coumadin/Warfarin

## 2018-06-04 NOTE — H&P ADULT - NSHPPHYSICALEXAM_GEN_ALL_CORE
Vital Signs Last 24 Hrs  T(C): 36.2 (04 Jun 2018 10:05), Max: 37 (04 Jun 2018 10:05)  T(F): 97.1 (04 Jun 2018 10:05), Max: 98.6 (04 Jun 2018 10:05)  HR: 91 (04 Jun 2018 13:15) (90 - 97)  BP: 161/94 (04 Jun 2018 13:15) (160/92 - 186/110)  BP(mean): --  RR: 18 (04 Jun 2018 13:15) (18 - 20)  SpO2: 95% (04 Jun 2018 13:15) (95% - 99%)    Neuro exam:  MS - AAOx3, naming in tact, repetition was initially mildly impaired but improved while in the ED, initially pt was making some phonemic errors, for example when saying radio "fradio"  however her speech was fluent and coherent throughout the code  CNs - EOMI, Mild right sided nasolabial flattening (chronic), PERRL, light touch sensation in tact throughout  Motor - no drift x 4  Sensory - light touch grossly in tact throughout, no extinction  Coordination - FNF in tact b/l  Gait - at baseline, pt is mainly wheelchair bound but able to stand with assistance and take a few steps which 1-2 person assist

## 2018-06-04 NOTE — DISCHARGE NOTE ADULT - MEDICATION SUMMARY - MEDICATIONS TO TAKE
I will START or STAY ON the medications listed below when I get home from the hospital:    Tylenol 325 mg oral tablet  -- 2 tab(s) by mouth every 6 hours, As Needed  -- Indication: For Pain or fever prn     irbesartan 150 mg oral tablet  -- 1 tab(s) by mouth once a day (in the morning)  -- Indication: For Hypertension     warfarin 2 mg oral tablet  -- 1 tab(s) by mouth once a day three times a week: Tuesday, Thursday, Saturday   -- Indication: For Atrial Fibrillation     warfarin 2 mg oral tablet  -- 0.5 tab(s) by mouth 4 times a week: Sunday, Monday, Wednesday, Friday  -- Indication: For Atrial Fibrillation    atorvastatin 40 mg oral tablet  -- 1 tab(s) by mouth once a day (at bedtime)  -- Indication: For Hyperlipidemia    ZyPREXA 10 mg oral tablet  -- 1 tab(s) by mouth once a day  -- Indication: For Agitation     ascorbic acid 250 mg oral tablet  -- 1 tab(s) by mouth once a day  -- Indication: For Supplement    Vitamin D3 1000 intl units oral tablet  -- 1 tab(s) by mouth once a day  -- Indication: For Supplement

## 2018-06-04 NOTE — ED ADULT NURSE REASSESSMENT NOTE - NS ED NURSE REASSESS COMMENT FT1
Report taken from Laverne STARR in [facility]. Pt resting in stretcher in NAD and VSS with cardiac monitor in place. Family at bedside. Pt pending MRI. Pt family notified that MRI is ordered routine and MRI will send for transport when ready. Pt is admitted and awaiting bed placement. PT and family encouraged to communicate any needs to staff.

## 2018-06-04 NOTE — ED PROVIDER NOTE - CRITICAL CARE INDICATION, MLM
patient was critically ill... Patient was critically ill with a high probability of imminent or life threatening deterioration due to acute slurred speech concerning for possible acute stroke requiring stroke neurology evaluation and emergent imaging

## 2018-06-04 NOTE — CONSULT NOTE ADULT - ASSESSMENT
Pt is an 86 year old  [place] lady (was a smoker in the past) with a pmhx of A-fib on coumadin, prior stroke in 2013 (MRI not done at that time), HTN, Gastritis, IBS, anxiety and depression. Pt was at her normal baseline upon awakening and while having breakfast at 7:30am, At 8am the HHA noticed the pt to have a speech disturbance described as slurred speech and not making sense. Also pt had generalized tremors which shortly after resolved and pt was very anxious associated with generalized shaking. Pt completed a ten day course of oral antibiotics two days ago for UTI. Also pt recently stopped SSRI, and follows with Dr. Angulo at [facility]. She was admitted in 2013 under the stroke service for a similar complaints which was documented as word finding difficulties and aphasia. At that time MRI was deferred and pt was discharged. On exam initially pt was AAOx3, naming in tact, repetition was initially mildly impaired but improved while in the ED, initially pt was making some phonemic errors, for example when saying radio pt stated "fradio" , her speech was fluent and coherent throughout the code. And eventually within < 20 minutes pt's aphasic symptoms improved completely. At baseline she has a mild right sided nasolabial flattening (chronic). No other deficits appreciated. Pt is walking at baseline, mainly wheelchair bound but able to stand with assistance and take a few steps which 1-2 person assist. NIHSS 3 which improved to 0. CT head was unremarkable.    Impression - Speech disturbance possibly related to left hemispheric dysfunction vs seizure like activity / anxiety in the setting of recent Psychiatric medications.    Reccs:  MRI brain without contrast if not contraindicated  MRA head without contrast and neck with contrast if not contraindicated  Give one dose of ASA 81 mg PO Now  Continue with Home dose of coumadin, follow up INR in the AM  If INR is close to or > 2 continue with home dose of coumadin  If INR is significantly less than 2 then will need to consider switching Coumadin to Eliquis upon DC.   Start Lipitor 80 mg PO QHS  HbA1c, lipid panel  Tele monitor  PT/OT eval  DVT prophylaxis with heparin/lovenox   Permissive Hypertension of 220/110    Above plan discussed with Neurovascular attending on call. Pt is an 86 year old  [place] lady (was a smoker in the past) with a pmhx of A-fib on coumadin, prior stroke in 2013 (MRI not done at that time), HTN, Gastritis, IBS, anxiety and depression. Pt was at her normal baseline upon awakening and while having breakfast at 7:30am, At 8am the HHA noticed the pt to have a speech disturbance described as slurred speech and not making sense. Also pt had generalized tremors which shortly after resolved and pt was very anxious associated with generalized shaking. Pt completed a ten day course of oral antibiotics two days ago for UTI. Also pt recently stopped SSRI, and follows with Dr. Angulo at [facility]. She was admitted in 2013 under the stroke service for a similar complaints which was documented as word finding difficulties and aphasia. At that time MRI was deferred and pt was discharged. On exam initially pt was AAOx3, naming in tact, repetition was initially mildly impaired but improved while in the ED, initially pt was making some phonemic errors, for example when saying radio pt stated "fradio" , her speech was fluent and coherent throughout the code. And eventually within < 20 minutes pt's aphasic symptoms improved completely. At baseline she has a mild right sided nasolabial flattening (chronic). No other deficits appreciated. Pt is walking at baseline, mainly wheelchair bound but able to stand with assistance and take a few steps which 1-2 person assist. NIHSS 3 which improved to 0. CT head was unremarkable.    Impression - Speech disturbance possibly related to left hemispheric dysfunction vs seizure like activity / anxiety in the setting of recent Psychiatric medications vs infectious encephalopathic features although less likely    Reccs:  MRI brain without contrast if not contraindicated  MRA head without contrast and neck with contrast if not contraindicated  Give one dose of ASA 81 mg PO Now  Continue with Home dose of coumadin, follow up INR in the AM  If INR is close to or > 2 continue with home dose of coumadin  If INR is significantly less than 2 then will need to consider switching Coumadin to Eliquis upon DC.   Start Lipitor 80 mg PO QHS  HbA1c, lipid panel  Tele monitor  Permissive Hypertension of 220/110  Would follow up with [facility] Psychiatrist about abrupt change in SSRI    Above plan discussed with Neurovascular attending on call.

## 2018-06-04 NOTE — ED ADULT NURSE NOTE - ED STAT RN HANDOFF DETAILS
hand off given to oncoming MATTY Zavala hand off given to oncoming RN Melissa Zavala. A&Ox4. speech clear. No c/o fall risk precautions maintained.

## 2018-06-04 NOTE — CONSULT NOTE ADULT - SUBJECTIVE AND OBJECTIVE BOX
Neurology Consult Note    Pt is an 86 year old RH Citizen of the [place] lady (was a smoker in the past) with a pmhx of A-fib on coumadin, prior stroke in 2013 (MRI not done at that time), HTN, Gastritis, IBS, anxiety and depression. Pt was at her normal baseline upon awakening and while having breakfast at 7:30am, At 8am the HHA noticed the pt to have a speech disturbance described as slurred speech and not making sense. Also pt had generalized tremors which shortly after resolved and pt was very anxious associated with generalized shaking. Pt completed a ten day course of oral antibiotics two days ago for UTI. She was admitted in 2013 under the stroke service for a similar complaints which was documented as word finding difficulties and aphasia. At that time MRI was deferred and pt was discharged.     Allergies    Levaquin (Faint)  penicillin (Faint)  sulfa drugs (Unknown)    Intolerances    Lorabid (Faint)    PAST MEDICAL & SURGICAL HISTORY:  IBS (irritable bowel syndrome)  Gastritis  Atrial fibrillation  CVA (cerebral infarction)  Smoking  Hypertension  No significant past surgical history    Home Medications:  ascorbic acid 250 mg oral tablet: 1 tab(s) orally once a day (16 Aug 2017 00:49)  cephalexin 250 mg oral capsule: 1 cap(s) orally once a day (18 Aug 2017 12:18)  Geodon 20 mg oral capsule: 1 cap(s) orally once a day (at bedtime) (16 Aug 2017 00:49)  irbesartan 150 mg oral tablet: 1 tab(s) orally once a day (in the morning) (16 Aug 2017 00:49)  sertraline 100 mg oral tablet: 1 tab(s) orally once a day (takes total of 150 mg per day) (16 Aug 2017 00:49)  sertraline 50 mg oral tablet: 1 tab(s) orally once a day (takes total of 150 mg per day) (16 Aug 2017 00:49)  Tylenol 325 mg oral tablet: 2 tab(s) orally every 6 hours, As Needed (18 Aug 2017 12:32)  Vitamin D3 1000 intl units oral tablet: 1 tab(s) orally once a day (16 Aug 2017 00:49)  warfarin 1 mg oral tablet: 1 tab(s) orally 5 times a week (Tues, Thurs, Fri, Sat &amp; Sun) (16 Aug 2017 00:49)  warfarin 2 mg oral tablet: 1 tab(s) orally 2 times a week (Monday &amp; Wednesday) (16 Aug 2017 00:49)    Vital Signs Last 24 Hrs  T(C): 36.2 (04 Jun 2018 10:05), Max: 36.3 (04 Jun 2018 09:54)  T(F): 97.1 (04 Jun 2018 10:05), Max: 97.3 (04 Jun 2018 09:54)  HR: 95 (04 Jun 2018 10:27) (90 - 97)  BP: 174/98 (04 Jun 2018 10:27) (174/98 - 186/110)  BP(mean): --  RR: 18 (04 Jun 2018 10:27) (18 - 20)  SpO2: 96% (04 Jun 2018 10:27) (95% - 99%)    Exam:  MS - AAOx3, naming in tact, repetition was initially mildly impaired but improved while in the ED, initially pt was making some phonemic errors, for example when saying radio "[employer]"  hovwer her speech was fluent and coherent throughout the code  CNs - EOMI, Mild right sided nasolabial flattening (chronic), PERRL, light touch sensation in tact throughout  Motor - no drift x 4  Sensory - light touch grossly in tact throughout, no extinction  Coordination - FNF in tact b/l  Gait - at baseline, pt is mainly wheelchair bound but able to stand with assistance and take a few steps which 1-2 person assist    Labs                        13.6   8.6   )-----------( 209      ( 04 Jun 2018 10:17 )             40.7   06-04    141  |  104  |  26<H>  ----------------------------<  88  4.4   |  25  |  1.39<H>    Ca    10.2      04 Jun 2018 10:17    TPro  8.1  /  Alb  4.4  /  TBili  0.4  /  DBili  x   /  AST  18  /  ALT  12  /  AlkPhos  103  06-04  PT/INR - ( 04 Jun 2018 10:17 )   PT: 21.5 sec;   INR: 1.95 ratio         PTT - ( 04 Jun 2018 10:17 )  PTT:41.1 sec  LIVER FUNCTIONS - ( 04 Jun 2018 10:17 )  Alb: 4.4 g/dL / Pro: 8.1 g/dL / ALK PHOS: 103 U/L / ALT: 12 U/L / AST: 18 U/L / GGT: x           Imaging  < from: CT Brain Stroke Protocol (06.04.18 @ 10:28) >  INTERPRETATION:  Noncontrast CT of the brain.    CLINICAL INDICATION:  Slurred speech, difficulty with word repetition    TECHNIQUE : Axial CT scanning of the brain was obtained from the skull   base to the vertex without the administration of intravenous contrast.      COMPARISON: Brain CT dated 8/15/2017 and brain MRI dated 1/5/2013    FINDINGS:  No acute hemorrhage or major distribution infarct is   identified. Age-appropriate involutional changes and moderate   microvascular ischemic changes are similar compared with the prior CTA.    A lucency is noted within the basil which appears to been present on the   prior examination.    No hydrocephalus, midline shift or extra-axial collections are present.     The orbits, paranasal sinuses and tympanomastoid cavities are not   remarkable in appearance.    Impression:    No acute major distribution infarct or hemorrhage.  Moderate microvascularischemic change similar in appearance to the prior   study.  Pontine lucency appears to have been present previously.    < end of copied text >

## 2018-06-04 NOTE — H&P ADULT - HISTORY OF PRESENT ILLNESS
Pt is an 86 year old RH [place] lady (was a smoker in the past) with a pmhx of A-fib on coumadin, prior stroke in 2013 (MRI not done at that time), HTN, Gastritis, IBS, anxiety and depression. Pt was at her normal baseline upon awakening and while having breakfast at 7:30am, At 8am the HHA noticed the pt to have a speech disturbance described as slurred speech and not making sense. Also pt had generalized tremors which shortly after resolved and pt was very anxious associated with generalized shaking. Pt completed a ten day course of oral antibiotics two days ago for UTI. She was admitted in 2013 under the stroke service for a similar complaints which was documented as word finding difficulties and aphasia. At that time MRI was deferred and pt was discharged.

## 2018-06-04 NOTE — DISCHARGE NOTE ADULT - PATIENT PORTAL LINK FT
You can access the [facility] Patient Portal, offered by [facility], by registering with the following website: [email or web address]

## 2018-06-04 NOTE — H&P ADULT - NSHPLABSRESULTS_GEN_ALL_CORE
Labs             13.6   8.6   )-----------( 209      ( 2018 10:17 )             40.7   06-04    141  |  104  |  26<H>  ----------------------------<  88  4.4   |  25  |  1.39<H>    Ca    10.2      2018 10:17    TPro  8.1  /  Alb  4.4  /  TBili  0.4  /  DBili  x   /  AST  18  /  ALT  12  /  AlkPhos  103  06-04  `PT/INR - ( 2018 10:17 )   PT: 21.5 sec;   INR: 1.95 ratio         PTT - ( 2018 10:17 )  PTT:41.1 sec    LIVER FUNCTIONS - ( 2018 10:17 )  Alb: 4.4 g/dL / Pro: 8.1 g/dL / ALK PHOS: 103 U/L / ALT: 12 U/L / AST: 18 U/L / GGT: x         Urinalysis Basic - ( 2018 12:06 )    Color: Yellow / Appearance: SL Turbid / S.017 / pH: x  Gluc: x / Ketone: Negative  / Bili: Negative / Urobili: Negative   Blood: x / Protein: Trace / Nitrite: Negative   Leuk Esterase: Trace / RBC: 0-2 /HPF / WBC 10-25 /HPF   Sq Epi: x / Non Sq Epi: Occasional /HPF / Bacteria: Few /HPF      < from: CT Brain Stroke Protocol (18 @ 10:28) >    INTERPRETATION:  Noncontrast CT of the brain.    CLINICAL INDICATION:  Slurred speech, difficulty with word repetition    TECHNIQUE : Axial CT scanning of the brain was obtained from the skull   base to the vertex without the administration of intravenous contrast.      COMPARISON: Brain CT dated 8/15/2017 and brain MRI dated 2013    FINDINGS:  No acute hemorrhage or major distribution infarct is   identified. Age-appropriate involutional changes and moderate   microvascular ischemic changes are similar compared with the prior CTA.    A lucency is noted within the basil which appears to been present on the   prior examination.    No hydrocephalus, midline shift or extra-axial collections are present.     The orbits, paranasal sinuses and tympanomastoid cavities are not   remarkable in appearance.    Impression:    No acute major distribution infarct or hemorrhage.  Moderate microvascularischemic change similar in appearance to the prior   study.  Pontine lucency appears to have been present previously.    < end of copied text >

## 2018-06-04 NOTE — DISCHARGE NOTE ADULT - NS AS DC STROKE ED MATERIALS
Prescribed Medications/Need for Followup After Discharge/Stroke Education Booklet/Call 911 for Stroke/Risk Factors for Stroke/Stroke Warning Signs and Symptoms Prescribed Medications/Need for Followup After Discharge/Risk Factors for Stroke/Stroke Warning Signs and Symptoms/Call 911 for Stroke/Stroke Education Booklet

## 2018-06-04 NOTE — ED PROVIDER NOTE - ATTENDING CONTRIBUTION TO CARE
Patient seen and evaluated with resident/NP/PA, however HPI, ROS, PE and MDM as documented authored by myself - Darrel Lundy MD

## 2018-06-04 NOTE — ED PROVIDER NOTE - OBJECTIVE STATEMENT
History provided by daughters/aide.  Patient awoke as normal this morning, after breakfast noted to be slurring speech/speaking incoherently, associated with generalized shaking.  Shaking resolved, still having some confusion at this time (less oriented than normal).  Prior to this was at baseline mental status.  Compliant with medications.  Was offering no complaints prior to onset of symptoms per aide.

## 2018-06-04 NOTE — H&P ADULT - ASSESSMENT
Pt is an 86 year old  English lady (was a smoker in the past) with a pmhx of A-fib on coumadin, prior stroke in 2013 (MRI not done at that time), HTN, Gastritis, IBS, anxiety and depression. Pt was at her normal baseline upon awakening and while having breakfast at 7:30am, At 8am the HHA noticed the pt to have a speech disturbance described as slurred speech and not making sense. Also pt had generalized tremors which shortly after resolved and pt was very anxious associated with generalized shaking. Pt completed a ten day course of oral antibiotics two days ago for UTI. Also pt recently stopped SSRI, and follows with Dr. Angulo at [facility]. She was admitted in 2013 under the stroke service for a similar complaints which was documented as word finding difficulties and aphasia. At that time MRI was deferred and pt was discharged. On exam initially pt was AAOx3, naming in tact, repetition was initially mildly impaired but improved while in the ED, initially pt was making some phonemic errors, for example when saying radio pt stated "fradio" , her speech was fluent and coherent throughout the code. And eventually within < 20 minutes pt's aphasic symptoms improved completely. At baseline she has a mild right sided nasolabial flattening (chronic). No other deficits appreciated. Pt is walking at baseline, mainly wheelchair bound but able to stand with assistance and take a few steps which 1-2 person assist. NIHSS 3 which improved to 0. CT head was unremarkable.    Impression - Speech disturbance possibly related to left hemispheric dysfunction vs seizure like activity / anxiety in the setting of recent Psychiatric medications vs infectious encephalopathic features although less likely    Reccs:  MRI brain without contrast if not contraindicated  MRA head without contrast and neck with contrast if not contraindicated  Give one dose of ASA 81 mg PO Now  Continue with Home dose of coumadin, follow up INR in the AM  If INR is close to or > 2 continue with home dose of coumadin  If INR is significantly less than 2 then will need to consider switching Coumadin to Eliquis upon DC.   Start Lipitor 80 mg PO QHS  HbA1c, lipid panel  Tele monitor  Permissive Hypertension of 220/110  Would follow up with [facility] Psychiatrist about abrupt change in SSRI    Above plan discussed with Neurovascular attending on call. Pt is an 86 year old  [place] lady (was a smoker in the past) with a pmhx of A-fib on coumadin, prior stroke in 2013 (MRI not done at that time), HTN, Gastritis, IBS, anxiety and depression. Pt was at her normal baseline upon awakening and while having breakfast at 7:30am, At 8am the HHA noticed the pt to have a speech disturbance described as slurred speech and not making sense. Also pt had generalized tremors which shortly after resolved and pt was very anxious associated with generalized shaking. Pt completed a ten day course of oral antibiotics two days ago for UTI. Also pt recently stopped SSRI, and follows with Dr. Angulo at [facility]. She was admitted in 2013 under the stroke service for a similar complaints which was documented as word finding difficulties and aphasia. At that time MRI was deferred and pt was discharged. On exam initially pt was AAOx3, naming in tact, repetition was initially mildly impaired but improved while in the ED, initially pt was making some phonemic errors, for example when saying radio pt stated "fradio" , her speech was fluent and coherent throughout the code. And eventually within < 20 minutes pt's aphasic symptoms improved completely. At baseline she has a mild right sided nasolabial flattening (chronic). No other deficits appreciated. Pt is walking at baseline, mainly wheelchair bound but able to stand with assistance and take a few steps which 1-2 person assist. NIHSS 3 which improved to 0. CT head was unremarkable.    Impression - Speech disturbance possibly related to left hemispheric dysfunction vs seizure like activity / anxiety in the setting of recent Psychiatric medications vs infectious encephalopathic features although less likely    Reccs:  MRI brain without contrast if not contraindicated  MRA head without contrast and neck with contrast if not contraindicated  Give one dose of ASA 81 mg PO Now  Continue with Home dose of coumadin, follow up INR in the AM  If INR is close to or > 2 continue with home dose of coumadin  If INR is significantly less than 2 then will need to consider switching Coumadin to Eliquis upon DC.   Start Lipitor 80 mg PO QHS  HbA1c, lipid panel  Tele monitor  rEEG  Permissive Hypertension of 220/110  FU with Psychiatrist from [facility] about abrupt change in SSRI, Dr. Stein    Above plan discussed with Neurovascular attending on call. Pt is an 86 year old  [place] lady (was a smoker in the past) with a pmhx of A-fib on coumadin, prior stroke in 2013 (MRI not done at that time), HTN, Gastritis, IBS, anxiety and depression. Pt was at her normal baseline upon awakening and while having breakfast at 7:30am thereafter. At 8am the HHA noticed the pt to have a speech disturbance described as slurred speech and not making sense. Also pt had generalized tremors which shortly after resolved and pt was very anxious associated with generalized shaking. Pt completed a ten day course of oral antibiotics two days ago for UTI. Also pt recently stopped SSRI, and follows with Dr. Angulo at [facility]. She was admitted in 2013 under the stroke service for a similar complaints which was documented as word finding difficulties and aphasia. At that time MRI was deferred and pt was discharged. On exam initially pt was AAOx3, naming in tact, repetition was initially mildly impaired but improved while in the ED, initially pt was making some phonemic errors, for example when saying radio pt stated "fradio" , her speech was fluent and coherent throughout the code. And eventually within < 20 minutes pt's aphasic symptoms improved completely. At baseline she has a mild right sided nasolabial flattening (chronic). No other deficits appreciated. Pt  is mainly wheelchair bound but able to stand with assistance and take a few steps which 1-2 person assist. Crruently she is at baseline. NIHSS 3 which improved to 0. CT head was unremarkable.    Impression - Speech disturbance possibly related to left hemispheric dysfunction vs seizure like activity / anxiety in the setting of recent Psychiatric medications vs infectious encephalopathic features although less likely    Reccs:  MRI brain without contrast if not contraindicated  MRA head without contrast and neck with contrast if not contraindicated  Give one dose of ASA 81 mg PO Now  Continue with Home dose of coumadin, follow up INR in the AM  If INR is close to or > 2 continue with home dose of coumadin  If INR is significantly less than 2 then will need to consider switching Coumadin to Eliquis upon DC.   Start Lipitor 80 mg PO QHS  HbA1c, lipid panel  Tele monitor  rEEG  Permissive Hypertension of 220/110  Unasyn for positive UA, FU full infectious workup    Above plan discussed with Neurovascular attending on call. Pt is an 86 year old  [place]ian lady (was a smoker in the past) with a pmhx of A-fib on coumadin, prior stroke in 2013 (MRI not done at that time), HTN, Gastritis, IBS, anxiety and depression. Pt was at her normal baseline upon awakening and while having breakfast at 7:30am thereafter. At 8am the HHA noticed the pt to have a speech disturbance described as slurred speech and not making sense. Also pt had generalized tremors which shortly after resolved and pt was very anxious associated with generalized shaking. Pt completed a ten day course of oral antibiotics two days ago for UTI. Also pt recently stopped SSRI, and follows with Dr. Angulo at [facility]. She was admitted in 2013 under the stroke service for a similar complaints which was documented as word finding difficulties and aphasia. At that time MRI was deferred and pt was discharged. On exam initially pt was AAOx3, naming in tact, repetition was initially mildly impaired but improved while in the ED, initially pt was making some phonemic errors, for example when saying radio pt stated "fradio" , her speech was fluent and coherent throughout the code. And eventually within < 20 minutes pt's aphasic symptoms improved completely. At baseline she has a mild right sided nasolabial flattening (chronic). No other deficits appreciated. Pt  is mainly wheelchair bound but able to stand with assistance and take a few steps which 1-2 person assist. Crruently she is at baseline. NIHSS 3 which improved to 0. CT head was unremarkable.    Impression - Speech disturbance possibly related to left hemispheric dysfunction vs seizure like activity / anxiety in the setting of recent Psychiatric medications vs infectious encephalopathic features although less likely    Reccs:  MRI brain without contrast if not contraindicated  MRA head without contrast and neck with contrast if not contraindicated  Give one dose of ASA 81 mg PO Now  Continue with Home dose of coumadin, follow up INR in the AM  If INR is close to or > 2 continue with home dose of coumadin  If INR is significantly less than 2 then will need to consider switching Coumadin to Eliquis upon DC if pt has non-valvular a fib.   Start Lipitor 80 mg PO QHS  HbA1c, lipid panel  Tele monitor  rEEG  Permissive Hypertension of 220/110  Unasyn for positive UA, FU full infectious workup    Above plan discussed with Neurovascular attending on call.

## 2018-06-04 NOTE — DISCHARGE NOTE ADULT - HOSPITAL COURSE
86 year old RH [place] lady (was a smoker in the past) with a pmhx of A-fib on coumadin, prior stroke in 2013 (MRI not done at that time), HTN, Gastritis, IBS, anxiety and depression. Pt was at her normal baseline and found to have a speech disturbance, described as slurred speech associated with generalized tremors, anxiety. She had a similar episode in 2013 under the stroke service for a similar complaints, at that time MRI was deferred and pt was discharged.     Patient was admitted, MRI performed which was negative for stroke. Infectious workup done, with positive blood cultures.   Patient is back to baseline, will be discharged and should follow up with her PCP. 86 year old RH [place] lady (was a smoker in the past) with a pmhx of A-fib on coumadin, prior stroke in 2013 (MRI not done at that time), HTN, Gastritis, IBS, anxiety and depression. Pt was at her normal baseline and found to have a speech disturbance, described as slurred speech associated with generalized tremors, anxiety. She had a similar episode in 2013 under the stroke service for a similar complaints, at that time MRI was deferred and pt was discharged.     Patient was admitted, MRI performed which was negative for stroke, etiology of symptoms suspected to be TIA. Infectious workup done, with positive blood cultures.   Patient is back to baseline, will be discharged and should follow up with her PCP for outpatient TTE, should consider changing to Eliquis in the future if no contraindications.

## 2018-06-04 NOTE — DISCHARGE NOTE ADULT - CARE PLAN
Principal Discharge DX:	Slurred speech  Goal:	improve functionality  Assessment and plan of treatment:	MRI negative, not a stroke   follow up with your PCP within one week Principal Discharge DX:	Slurred speech  Goal:	improve functionality  Assessment and plan of treatment:	MRI negative, not a stroke   follow up with your PCP within one week, continue taking your medications Principal Discharge DX:	Transient cerebral ischemia, unspecified type  Goal:	improve functionality  Assessment and plan of treatment:	MRI negative   follow up with your PCP within one week, continue taking your medications  consider changing to Eliquis after outpatient TTE

## 2018-06-04 NOTE — ED ADULT NURSE NOTE - OBJECTIVE STATEMENT
[id number] 86 yr old WF brought to ER via ambulance on stretcher for further eval and tx of difficulty speaking since 730 AM. Hx tyhrough family. Previuos CVA 5 yrs ago 0959 86 yr old WF brought to ER via ambulance on stretcher for further eval and tx of difficulty speaking since 730 AM. Hx through family. Woke up without symptoms. Previous CVA 5 yrs ago with no residual problems. Usually ambulates with walker. Slurred speech. difficulty answering questions. denies HA or dizziness. Awake and alert. speech slurred. Slight droop noted at corner of right side of mouth. Dr Kamron randolph pt. Code stroke paged. See code stroke flow sheet

## 2018-06-05 LAB
ANION GAP SERPL CALC-SCNC: 15 MMOL/L — SIGNIFICANT CHANGE UP (ref 5–17)
BUN SERPL-MCNC: 22 MG/DL — SIGNIFICANT CHANGE UP (ref 7–23)
CALCIUM SERPL-MCNC: 9.6 MG/DL — SIGNIFICANT CHANGE UP (ref 8.4–10.5)
CHLORIDE SERPL-SCNC: 104 MMOL/L — SIGNIFICANT CHANGE UP (ref 96–108)
CHOLEST SERPL-MCNC: 225 MG/DL — HIGH (ref 10–199)
CO2 SERPL-SCNC: 23 MMOL/L — SIGNIFICANT CHANGE UP (ref 22–31)
CREAT SERPL-MCNC: 1.15 MG/DL — SIGNIFICANT CHANGE UP (ref 0.5–1.3)
CULTURE RESULTS: SIGNIFICANT CHANGE UP
GLUCOSE SERPL-MCNC: 114 MG/DL — HIGH (ref 70–99)
GRAM STN FLD: SIGNIFICANT CHANGE UP
HBA1C BLD-MCNC: 5.3 % — SIGNIFICANT CHANGE UP (ref 4–5.6)
HCT VFR BLD CALC: 37.9 % — SIGNIFICANT CHANGE UP (ref 34.5–45)
HDLC SERPL-MCNC: 57 MG/DL — SIGNIFICANT CHANGE UP (ref 40–125)
HGB BLD-MCNC: 12.9 G/DL — SIGNIFICANT CHANGE UP (ref 11.5–15.5)
LIPID PNL WITH DIRECT LDL SERPL: 147 MG/DL — HIGH
MCHC RBC-ENTMCNC: 30.5 PG — SIGNIFICANT CHANGE UP (ref 27–34)
MCHC RBC-ENTMCNC: 34 GM/DL — SIGNIFICANT CHANGE UP (ref 32–36)
MCV RBC AUTO: 90 FL — SIGNIFICANT CHANGE UP (ref 80–100)
PLATELET # BLD AUTO: 189 K/UL — SIGNIFICANT CHANGE UP (ref 150–400)
POTASSIUM SERPL-MCNC: 4.2 MMOL/L — SIGNIFICANT CHANGE UP (ref 3.5–5.3)
POTASSIUM SERPL-SCNC: 4.2 MMOL/L — SIGNIFICANT CHANGE UP (ref 3.5–5.3)
RBC # BLD: 4.21 M/UL — SIGNIFICANT CHANGE UP (ref 3.8–5.2)
RBC # FLD: 13.8 % — SIGNIFICANT CHANGE UP (ref 10.3–14.5)
SODIUM SERPL-SCNC: 142 MMOL/L — SIGNIFICANT CHANGE UP (ref 135–145)
SPECIMEN SOURCE: SIGNIFICANT CHANGE UP
SPECIMEN SOURCE: SIGNIFICANT CHANGE UP
TOTAL CHOLESTEROL/HDL RATIO MEASUREMENT: 3.9 RATIO — SIGNIFICANT CHANGE UP (ref 3.3–7.1)
TRIGL SERPL-MCNC: 107 MG/DL — SIGNIFICANT CHANGE UP (ref 10–149)
WBC # BLD: 6.2 K/UL — SIGNIFICANT CHANGE UP (ref 3.8–10.5)
WBC # FLD AUTO: 6.2 K/UL — SIGNIFICANT CHANGE UP (ref 3.8–10.5)

## 2018-06-05 PROCEDURE — 99233 SBSQ HOSP IP/OBS HIGH 50: CPT

## 2018-06-05 PROCEDURE — 70551 MRI BRAIN STEM W/O DYE: CPT | Mod: 26

## 2018-06-05 RX ORDER — ATORVASTATIN CALCIUM 80 MG/1
40 TABLET, FILM COATED ORAL AT BEDTIME
Qty: 0 | Refills: 0 | Status: DISCONTINUED | OUTPATIENT
Start: 2018-06-05 | End: 2018-06-06

## 2018-06-05 RX ADMIN — Medication 250 MILLIGRAM(S): at 12:47

## 2018-06-05 RX ADMIN — Medication 1000 UNIT(S): at 12:47

## 2018-06-05 RX ADMIN — WARFARIN SODIUM 2 MILLIGRAM(S): 2.5 TABLET ORAL at 21:08

## 2018-06-05 RX ADMIN — OLANZAPINE 10 MILLIGRAM(S): 15 TABLET, FILM COATED ORAL at 21:08

## 2018-06-05 RX ADMIN — ATORVASTATIN CALCIUM 40 MILLIGRAM(S): 80 TABLET, FILM COATED ORAL at 21:08

## 2018-06-05 NOTE — PHYSICAL THERAPY INITIAL EVALUATION ADULT - PLANNED THERAPY INTERVENTIONS, PT EVAL
gait training/Stair Negotiation: Able to negotiate 3 steps with hand rail and appropriate assistive device with mod assist x 1person./strengthening/bed mobility training/transfer training gait training/Stair Negotiation GOAL: Able to negotiate 3 steps with hand rail and appropriate assistive device with mod assist x 1person./bed mobility training/transfer training/strengthening transfer training/Stair Negotiation GOAL: Able to negotiate 3 steps with hand rail and appropriate assistive device with mod assist x 1person./balance training/bed mobility training/gait training/strengthening

## 2018-06-05 NOTE — PHYSICAL THERAPY INITIAL EVALUATION ADULT - GENERAL OBSERVATIONS, REHAB EVAL
Pt received seated in bed in chair mode with +tele, +daughters at bed side. Pt received supine in bed with +tele, +daughters at bed side.

## 2018-06-05 NOTE — PROGRESS NOTE ADULT - ASSESSMENT
86-year-old right-handed white lady first evaluated at [facility] on 6/4/18 with acute speech disturbance. She has a history of a atrial fibrillation on Coumadin. In 2013, she had an episode of speech disturbance, diagnosed as probable aphasia which subsequently resolved. On 6/4/18, she had a similar episode of speech disturbance, "not making sense". There was associated severe anxiety, she complained that she "couldn't see", and shaking of both arms with a normal level of consciousness. She had a recent UTI. She has severe anxiety and depression, and her psychiatrist has recently changed her medications which according to her daughters have resulted in significantly worsened anxiety. At baseline, she is primarily wheelchair bound, and can take a couple of steps with assistance. Currently her symptoms are back to baseline.    IMPRESSION: Cerebral embolism without cerebral infarction secondary to cardioembolism caused by atrial fibrillation     NEURO: Neurologically with improvement since admission, continue close monitoring for neurologic deterioration, permissive HTN to gradual normotension, Started on Atorvastatin- titrate statin to LDL goal less than 70, MRI Brain w/o noted above. Physical therapy recommending home with home PT.    ANTITHROMBOTIC THERAPY: Continue on Warfarin for atrial fibrillation and secondary stroke prevention. Consider NOAC as outpatient.     PULMONARY: protecting airway, saturating well, NRD    CARDIOVASCULAR: TTE ordered to evaluate for valvular disease and structural cardiac source of embolism, cardiac monitoring shows Afib     SBP goal: Permissive HTN to gradual normotension     GASTROINTESTINAL:  dysphagia screen passed on Pureed diet       Diet: Purred     RENAL: BUN/Cr without acute change, good urine output      Na Goal: Greater than 135     Drew:    HEMATOLOGY: H/H 12.9/37.9, Platelets 189 ; no signs or symptoms of bleeding noted.     DVT ppx: on Warfarin so no need for further DVT ppx    ID: afebrile, without leukocytosis     OTHER: Plan discussed at length with daughter at bedside, all questions answered and concerns adressed     DISPOSITION: home with home PT when workup is complete      CORE MEASURES:        Admission NIHSS: 3     TPA: [] YES [x] NO      LDL/HDL: p     Depression Screen: p     Statin Therapy: Yes     Dysphagia Screen: [x] PASS [] FAIL     Smoking [] YES [x] NO (smoker in the past)     Afib [x] YES [] NO     Stroke Education [] YES [] NO- pending 86-year-old right-handed white lady first evaluated at [facility] on 6/4/18 with acute speech disturbance. She has a history of a atrial fibrillation on Coumadin. In 2013, she had an episode of speech disturbance, diagnosed as probable aphasia which subsequently resolved. On 6/4/18, she had a similar episode of speech disturbance, "not making sense". There was associated severe anxiety, she complained that she "couldn't see", and shaking of both arms with a normal level of consciousness. She had a recent UTI. She has severe anxiety and depression, and her psychiatrist has recently changed her medications which according to her daughters have resulted in significantly worsened anxiety. At baseline, she is primarily wheelchair bound, and can take a couple of steps with assistance. Currently her symptoms are back to baseline.    IMPRESSION: Transient aphasia due to Cerebral ischemia without cerebral infarction (TIA) secondary to cardioembolism caused by atrial fibrillation     NEURO: Neurologically with improvement since admission, continue close monitoring for neurologic deterioration, permissive HTN to gradual normotension, Started on Atorvastatin- titrate statin to LDL goal less than 70, MRI Brain w/o noted above. Physical therapy recommending home with home PT.    ANTITHROMBOTIC THERAPY: Continue on Warfarin for atrial fibrillation and secondary stroke prevention. Consider changing to NOAC as outpatient.     PULMONARY: protecting airway, saturating well, NRD    CARDIOVASCULAR: TTE ordered to evaluate for valvular disease and structural cardiac source of embolism-can be done as out-patient, cardiac monitoring shows Afib     SBP goal: Permissive HTN to gradual normotension     GASTROINTESTINAL:  dysphagia screen passed on Pureed diet       Diet: Purred     RENAL: BUN/Cr without acute change, good urine output      Na Goal: Greater than 135     Drew:    HEMATOLOGY: H/H 12.9/37.9, Platelets 189 ; no signs or symptoms of bleeding noted.     DVT ppx: on Warfarin so no need for further DVT ppx    ID: afebrile, without leukocytosis     OTHER: Plan discussed at length with daughter at bedside, all questions answered and concerns adressed     DISPOSITION: home with home PT when workup is complete      CORE MEASURES:        Admission NIHSS: 3     TPA: [] YES [x] NO      LDL/HDL: p     Depression Screen: p     Statin Therapy: Yes     Dysphagia Screen: [x] PASS [] FAIL     Smoking [] YES [x] NO (smoker in the past)     Afib [x] YES [] NO     Stroke Education [] YES [] NO- pending 86-year-old right-handed white lady first evaluated at [facility] on 6/4/18 with acute speech disturbance. She has a history of a atrial fibrillation on Coumadin. In 2013, she had an episode of speech disturbance, diagnosed as probable aphasia which subsequently resolved. On 6/4/18, she had a similar episode of speech disturbance, "not making sense". There was associated severe anxiety, she complained that she "couldn't see", and shaking of both arms with a normal level of consciousness. She had a recent UTI. She has severe anxiety and depression, and her psychiatrist has recently changed her medications which according to her daughters have resulted in significantly worsened anxiety. At baseline, she is primarily wheelchair bound, and can take a couple of steps with assistance. Currently her symptoms are back to baseline.    IMPRESSION: Transient aphasia due to Cerebral ischemia without cerebral infarction (TIA) secondary to cardioembolism caused by atrial fibrillation     NEURO: Neurologically with improvement since admission, continue close monitoring for neurologic deterioration, permissive HTN to gradual normotension, Started on Atorvastatin- titrate statin to LDL goal less than 70, MRI Brain w/o noted above. Physical therapy recommending home with home PT.    ANTITHROMBOTIC THERAPY: Continue on Warfarin for atrial fibrillation and secondary stroke prevention. Consider changing to NOAC as outpatient.     PULMONARY: protecting airway, saturating well, NRD    CARDIOVASCULAR: TTE ordered to evaluate for valvular disease and structural cardiac source of embolism-can be done as out-patient, cardiac monitoring shows Afib     SBP goal: Permissive HTN to gradual normotension     GASTROINTESTINAL:  dysphagia screen passed on Pureed diet       Diet: Purred     RENAL: BUN/Cr without acute change, good urine output      Na Goal: Greater than 135     Drew:    HEMATOLOGY: H/H 12.9/37.9, Platelets 189 ; no signs or symptoms of bleeding noted.     DVT ppx: on Warfarin so no need for further DVT ppx    ID: afebrile, without leukocytosis; ID consulted for enterococcus in urine is probably best viewed as a colonizer and recommended to hold antibiotics. Will monitor until 6/6.     OTHER: Plan discussed at length with daughter at bedside, all questions answered and concerns addressed     DISPOSITION: home with home PT when workup is complete      CORE MEASURES:        Admission NIHSS: 3     TPA: [] YES [x] NO      LDL/HDL: p     Depression Screen: p     Statin Therapy: Yes     Dysphagia Screen: [x] PASS [] FAIL     Smoking [] YES [x] NO (smoker in the past)     Afib [x] YES [] NO     Stroke Education [] YES [] NO- pending

## 2018-06-05 NOTE — PHYSICAL THERAPY INITIAL EVALUATION ADULT - ADDITIONAL COMMENTS
Admitted in 2013 under the stroke service for similar complaints documenteded as word finding difficulties and aphasia.     CTH 6/4: No acute major distribution infarct or hemorrhage. Moderate microvascular ischemic change similar in appearance to the prior study.Pontine lucency appears to have been present previously. Pt lives in a house with 3 steps to enter with B/L hand rails. Per daughter Pt has a home health aid 24hr/7 days pt is never alone. Pt has wheel chair and rolling walker at home. Pt   Admitted in 2013 under the stroke service for similar complaints documented as word finding difficulties and aphasia.     CTH 6/4: No acute major distribution infarct or hemorrhage. Moderate microvascular ischemic change similar in appearance to the prior study.Pontine lucency appears to have been present previously.

## 2018-06-05 NOTE — PHYSICAL THERAPY INITIAL EVALUATION ADULT - PERTINENT HX OF CURRENT PROBLEM, REHAB EVAL
86 F PMHX: A-fib on coumadin, prior stroke in 2013, HTN, Gastritis, IBS, anxiety and depression. Pt was at her normal baseline upon awakening 6/4 and while having breakfast at 7:30am, At 8am the HHA noticed the pt to have a speech disturbance described as slurred speech and not making sense. Also pt had generalized tremors which shortly after resolved and pt was very anxious associated with generalized shaking. 86 F PMHX: A-fib on coumadin, prior stroke in 2013, HTN, Gastritis, IBS, anxiety and depression. Pt was at her normal baseline upon awakening 6/4 and while having breakfast at 7:30am, At 8am the HHA noticed the pt to have a speech disturbance described as slurred speech and not making sense. Also pt had generalized tremors which shortly after resolved and pt was very anxious associated with generalized shaking.. NIHSS 3

## 2018-06-05 NOTE — PROGRESS NOTE ADULT - SUBJECTIVE AND OBJECTIVE BOX
86 year old RH [place] lady (was a smoker in the past) with a pmhx of A-fib on coumadin, prior stroke in 2013 (MRI not done at that time), HTN, Gastritis, IBS, anxiety and depression. Pt was at her normal baseline upon awakening and while having breakfast at 7:30am, at 8am on 6/4 the HHA noticed the patient to have a speech disturbance described as slurred speech and not making sense. Also pt had generalized tremors which shortly after resolved and pt was very anxious associated with generalized shaking. Patient completed a ten day course of oral antibiotics two days ago for UTI. She was admitted in 2013 under the stroke service for a similar complaints which was documented as word finding difficulties and aphasia. At that time MRI was deferred and pt was discharged.     SUBJECTIVE: No events overnight.  No new neurologic complaints.      acetaminophen   Tablet 650 milliGRAM(s) Oral every 6 hours PRN  acetaminophen   Tablet. 650 milliGRAM(s) Oral every 6 hours PRN  ascorbic acid 250 milliGRAM(s) Oral daily  cholecalciferol 1000 Unit(s) Oral daily  OLANZapine 10 milliGRAM(s) Oral daily  warfarin 2 milliGRAM(s) Oral <User Schedule>  warfarin 1 milliGRAM(s) Oral <User Schedule>    PHYSICAL EXAM:   Vital Signs Last 24 Hrs  T(C): 36.6 (05 Jun 2018 12:44), Max: 36.6 (05 Jun 2018 12:44)  T(F): 97.8 (05 Jun 2018 12:44), Max: 97.8 (05 Jun 2018 12:44)  HR: 89 (05 Jun 2018 12:44) (86 - 93)  BP: 161/103 (05 Jun 2018 12:44) (139/76 - 184/91)  RR: 18 (05 Jun 2018 12:44) (18 - 20)  SpO2: 96% (05 Jun 2018 12:44) (94% - 96%)    General: No acute distress  HEENT: EOM intact, visual fields full  Abdomen: Soft, nontender, nondistended   Extremities: No edema    NEUROLOGICAL EXAM:  Mental status: Awake, alert, oriented to person, hospital, month, speech fluent, follows simple commands  Cranial Nerves: No facial asymmetry, no nystagmus, no dysarthria, tongue midline  Motor exam: Normal tone, postural tremor, moves all extremities somewhat against gravity 5/5 RUE drift, 5/5 RLE, 5/5 LUE, 4+/5 LLE  Sensation: Intact to light touch   Coordination/ Gait: No dysmetria, gait- deferred    LABS:                        12.9   6.2   )-----------( 189      ( 05 Jun 2018 10:15 )             37.9    06-05    142  |  104  |  22  ----------------------------<  114<H>  4.2   |  23  |  1.15    Ca    9.6      05 Jun 2018 10:17    TPro  8.1  /  Alb  4.4  /  TBili  0.4  /  DBili  x   /  AST  18  /  ALT  12  /  AlkPhos  103  06-04  PT/INR - ( 04 Jun 2018 10:17 )   PT: 21.5 sec;   INR: 1.95 ratio       PTT - ( 04 Jun 2018 10:17 )  PTT:41.1 sec    IMAGING: Reviewed by me.     CT Head No Cont (06.04.18)  No acute major distribution infarct or hemorrhage.  Moderate microvascular ischemic change similar in appearance to the prior   study. Pontine lucency appears to have been present previously.    MRI Head No Cont (06.05.18)    No acute intracranial hemorrhage, mass effect, vasogenic   edema, or evidence of acute territorial infarct. 86 year old RH white lady (was a smoker in the past) with a pmhx of A-fib on coumadin, prior stroke in 2013 (MRI not done at that time), HTN, Gastritis, IBS, anxiety and depression. Pt was at her normal baseline upon awakening and while having breakfast at 7:30am, at 8am on 6/4 the HHA noticed the patient to have a speech disturbance described as slurred speech and not making sense. Also pt had generalized tremors which shortly after resolved and pt was very anxious associated with generalized shaking. Patient completed a ten day course of oral antibiotics two days ago for UTI. She was admitted in 2013 under the stroke service for a similar complaint which was documented as word finding difficulties and aphasia. At that time MRI was deferred and pt was discharged.     SUBJECTIVE: No events overnight.  No new neurologic complaints.      acetaminophen   Tablet 650 milliGRAM(s) Oral every 6 hours PRN  acetaminophen   Tablet. 650 milliGRAM(s) Oral every 6 hours PRN  ascorbic acid 250 milliGRAM(s) Oral daily  cholecalciferol 1000 Unit(s) Oral daily  OLANZapine 10 milliGRAM(s) Oral daily  warfarin 2 milliGRAM(s) Oral <User Schedule>  warfarin 1 milliGRAM(s) Oral <User Schedule>    PHYSICAL EXAM:   Vital Signs Last 24 Hrs  T(C): 36.6 (05 Jun 2018 12:44), Max: 36.6 (05 Jun 2018 12:44)  T(F): 97.8 (05 Jun 2018 12:44), Max: 97.8 (05 Jun 2018 12:44)  HR: 89 (05 Jun 2018 12:44) (86 - 93)  BP: 161/103 (05 Jun 2018 12:44) (139/76 - 184/91)  RR: 18 (05 Jun 2018 12:44) (18 - 20)  SpO2: 96% (05 Jun 2018 12:44) (94% - 96%)    General: No acute distress  HEENT: EOM intact, visual fields full  Abdomen: Soft, nontender, nondistended   Extremities: No edema    NEUROLOGICAL EXAM:  Mental status: Awake, alert, oriented to person, hospital, month, speech fluent, follows simple commands  Cranial Nerves: No facial asymmetry, no nystagmus, no dysarthria, tongue midline  Motor exam: Normal tone, postural tremor, moves all extremities somewhat against gravity 5/5 RUE drift, 5/5 RLE, 5/5 LUE, 4+/5 LLE  Sensation: Intact to light touch   Coordination/ Gait: No dysmetria, gait- deferred    LABS:                        12.9   6.2   )-----------( 189      ( 05 Jun 2018 10:15 )             37.9    06-05    142  |  104  |  22  ----------------------------<  114<H>  4.2   |  23  |  1.15    Ca    9.6      05 Jun 2018 10:17    TPro  8.1  /  Alb  4.4  /  TBili  0.4  /  DBili  x   /  AST  18  /  ALT  12  /  AlkPhos  103  06-04  PT/INR - ( 04 Jun 2018 10:17 )   PT: 21.5 sec;   INR: 1.95 ratio       PTT - ( 04 Jun 2018 10:17 )  PTT:41.1 sec    IMAGING: Reviewed by me.     CT Head No Cont (06.04.18)  No acute major distribution infarct or hemorrhage.  Moderate microvascular ischemic change similar in appearance to the prior   study. Pontine lucency appears to have been present previously.    MRI Head No Cont (06.05.18)    No acute intracranial hemorrhage, mass effect, vasogenic   edema, or evidence of acute territorial infarct.

## 2018-06-05 NOTE — CONSULT NOTE ADULT - ASSESSMENT
85 yo female with IBS,HTN,AF,CVA,who was admitted with change in mental status, clinical concerns for left hemispheric dysfunction.  GPR in blood of unclear significance, while my initial impresssion is that it will be a bacillus species or corynebacteria which are often contaminants,I am reluctant to send home untill we have ID of organism.She has been on antibiotics, came in with focal neurologic findings,and has been receiving antibiotics.I was unable to obtain a history suggestive of any chronic infection,no constitutional symptoms.She is afebrile, normal wbc count,hence little clinical support for a true bacteremia.  Enterococcus in urine is probably best viewed as a colonizer and does not warrant antibiotics.  Suggest:  1.hold antibiotics  2.Await ID of isolate, should be available in AM,if possible would like to keep untill 6/6 to clarify this result.I have reviewed with her daughters that my level of suspicion for true bacteremia is not high.

## 2018-06-05 NOTE — PHYSICAL THERAPY INITIAL EVALUATION ADULT - GAIT TRAINING, PT EVAL
Goal: Pt to amb 10' with Rolling walker with mod assist x 1 person within 4 weeks. Goal: Pt to amb 25' with Rolling walker with mod assist x 1 person within 4 weeks.

## 2018-06-05 NOTE — CONSULT NOTE ADULT - SUBJECTIVE AND OBJECTIVE BOX
HPI:   Patient is a 86y female with a past history of AF,prior TIA/CVA,who recently has been undergoing medication adjustments for depression,who was brought to ER yesterday when aide noticed her confused,tremoring,,with slurred speech.She   has improved since admission, head CT negative.She was recently treated with cefdinir for a UTI-foul smell to urine.This has improved, no fevers or chills.As part of a medical evaluation she has 2 sets of blood cultures sent which at 24 hours show a GPR in 1/4 bottles.She also has enterococcus in urine.She has received CTX x 1 dose.  REVIEW OF SYSTEMS:  All other review of systems negative (Comprehensive ROS): ? confusion, overall limited ambulatory status,    PAST MEDICAL & SURGICAL HISTORY:  IBS (irritable bowel syndrome)  Gastritis  Atrial fibrillation  CVA (cerebral infarction)  Smoking  Hypertension  No significant past surgical history      Allergies    Levaquin (Faint)  penicillin (Faint)  sulfa drugs (Unknown)    Intolerances    Lorabid (Faint)      Antimicrobials Day #  :s/p CTX    Other Medications:  acetaminophen   Tablet 650 milliGRAM(s) Oral every 6 hours PRN  acetaminophen   Tablet. 650 milliGRAM(s) Oral every 6 hours PRN  ascorbic acid 250 milliGRAM(s) Oral daily  cholecalciferol 1000 Unit(s) Oral daily  OLANZapine 10 milliGRAM(s) Oral daily  warfarin 2 milliGRAM(s) Oral <User Schedule>  warfarin 1 milliGRAM(s) Oral <User Schedule>      FAMILY HISTORY:      SOCIAL HISTORY:  Smoking: recently stopped    ETOH: no    Drug Use: no    Single     T(F): 97.8 (18 @ 12:44), Max: 97.8 (18 @ 12:44)  HR: 89 (18 @ 12:44)  BP: 161/103 (18 @ 12:44)  RR: 18 (18 @ 12:44)  SpO2: 96% (18 @ :44)  Wt(kg): --    PHYSICAL EXAM:  General: alert,pleasantly confused no acute distress  Eyes:  anicteric, no conjunctival injection, no discharge  Oropharynx: no lesions or injection 	  Neck: supple, without adenopathy  Lungs: clear to auscultation  Heart: irregular rate and rhythm; no murmur, rubs or gallops  Abdomen: soft, nondistended, nontender, without mass or organomegaly  Skin: no lesions  Extremities: no clubbing, cyanosis, or edema  Neurologic: alert, oriented, moves all extremities    LAB RESULTS:                        12.9   6.2   )-----------( 189      ( 2018 10:15 )             37.9         142  |  104  |  22  ----------------------------<  114<H>  4.2   |  23  |  1.15    Ca    9.6      2018 10:17    TPro  8.1  /  Alb  4.4  /  TBili  0.4  /  DBili  x   /  AST  18  /  ALT  12  /  AlkPhos  103  06-04    LIVER FUNCTIONS - ( 2018 10:17 )  Alb: 4.4 g/dL / Pro: 8.1 g/dL / ALK PHOS: 103 U/L / ALT: 12 U/L / AST: 18 U/L / GGT: x           Urinalysis Basic - ( 2018 12:06 )    Color: Yellow / Appearance: SL Turbid / S.017 / pH: x  Gluc: x / Ketone: Negative  / Bili: Negative / Urobili: Negative   Blood: x / Protein: Trace / Nitrite: Negative   Leuk Esterase: Trace / RBC: 0-2 /HPF / WBC 10-25 /HPF   Sq Epi: x / Non Sq Epi: Occasional /HPF / Bacteria: Few /HPF        MICROBIOLOGY:  RECENT CULTURES:   @ 14:30 .Blood Blood     Growth in anaerobic bottle: Gram Positive Rods    Growth in anaerobic bottle: Gram Positive Rods     @ 13:44 .Urine Clean Catch (Midstream)     50,000 - 99,000 CFU/mL Enterococcus faecalis  <10,000 CFU/ml Normal Urogenital jana present            RADIOLOGY REVIEWED:  < from: CT Brain Stroke Protocol (18 @ 10:28) >  Impression:    No acute major distribution infarct or hemorrhage.  Moderate microvascularischemic change similar in appearance to the prior   study.  Pontine lucency appears to have been present previously.    < end of copied text >

## 2018-06-05 NOTE — PHYSICAL THERAPY INITIAL EVALUATION ADULT - IMPAIRMENTS CONTRIBUTING TO GAIT DEVIATIONS, PT EVAL
Pt stating she felt she was going to fall despite fair balance/cognition/decreased strength cognition/fear of falling/decreased strength

## 2018-06-06 VITALS
OXYGEN SATURATION: 96 % | DIASTOLIC BLOOD PRESSURE: 97 MMHG | SYSTOLIC BLOOD PRESSURE: 169 MMHG | HEART RATE: 61 BPM | TEMPERATURE: 98 F | RESPIRATION RATE: 20 BRPM

## 2018-06-06 LAB
-  AMPICILLIN: SIGNIFICANT CHANGE UP
-  CIPROFLOXACIN: SIGNIFICANT CHANGE UP
-  NITROFURANTOIN: SIGNIFICANT CHANGE UP
-  TETRACYCLINE: SIGNIFICANT CHANGE UP
-  VANCOMYCIN: SIGNIFICANT CHANGE UP
ANION GAP SERPL CALC-SCNC: 13 MMOL/L — SIGNIFICANT CHANGE UP (ref 5–17)
APTT BLD: 37.6 SEC — HIGH (ref 27.5–37.4)
BASOPHILS # BLD AUTO: 0 K/UL — SIGNIFICANT CHANGE UP (ref 0–0.2)
BASOPHILS NFR BLD AUTO: 0 % — SIGNIFICANT CHANGE UP (ref 0–2)
BUN SERPL-MCNC: 25 MG/DL — HIGH (ref 7–23)
CALCIUM SERPL-MCNC: 9.2 MG/DL — SIGNIFICANT CHANGE UP (ref 8.4–10.5)
CHLORIDE SERPL-SCNC: 105 MMOL/L — SIGNIFICANT CHANGE UP (ref 96–108)
CHOLEST SERPL-MCNC: 210 MG/DL — HIGH (ref 10–199)
CO2 SERPL-SCNC: 23 MMOL/L — SIGNIFICANT CHANGE UP (ref 22–31)
CREAT SERPL-MCNC: 1.27 MG/DL — SIGNIFICANT CHANGE UP (ref 0.5–1.3)
CULTURE RESULTS: SIGNIFICANT CHANGE UP
EOSINOPHIL # BLD AUTO: 0.14 K/UL — SIGNIFICANT CHANGE UP (ref 0–0.5)
EOSINOPHIL NFR BLD AUTO: 2.7 % — SIGNIFICANT CHANGE UP (ref 0–6)
GLUCOSE SERPL-MCNC: 93 MG/DL — SIGNIFICANT CHANGE UP (ref 70–99)
HBA1C BLD-MCNC: 5.3 % — SIGNIFICANT CHANGE UP (ref 4–5.6)
HCT VFR BLD CALC: 35.8 % — SIGNIFICANT CHANGE UP (ref 34.5–45)
HDLC SERPL-MCNC: 46 MG/DL — SIGNIFICANT CHANGE UP (ref 40–125)
HGB BLD-MCNC: 11.4 G/DL — LOW (ref 11.5–15.5)
IMM GRANULOCYTES NFR BLD AUTO: 0.2 % — SIGNIFICANT CHANGE UP (ref 0–1.5)
INR BLD: 1.55 RATIO — HIGH (ref 0.88–1.16)
LIPID PNL WITH DIRECT LDL SERPL: 144 MG/DL — HIGH
LYMPHOCYTES # BLD AUTO: 1.32 K/UL — SIGNIFICANT CHANGE UP (ref 1–3.3)
LYMPHOCYTES # BLD AUTO: 25.2 % — SIGNIFICANT CHANGE UP (ref 13–44)
MCHC RBC-ENTMCNC: 28.6 PG — SIGNIFICANT CHANGE UP (ref 27–34)
MCHC RBC-ENTMCNC: 31.8 GM/DL — LOW (ref 32–36)
MCV RBC AUTO: 89.9 FL — SIGNIFICANT CHANGE UP (ref 80–100)
METHOD TYPE: SIGNIFICANT CHANGE UP
MONOCYTES # BLD AUTO: 0.41 K/UL — SIGNIFICANT CHANGE UP (ref 0–0.9)
MONOCYTES NFR BLD AUTO: 7.8 % — SIGNIFICANT CHANGE UP (ref 2–14)
NEUTROPHILS # BLD AUTO: 3.35 K/UL — SIGNIFICANT CHANGE UP (ref 1.8–7.4)
NEUTROPHILS NFR BLD AUTO: 64.1 % — SIGNIFICANT CHANGE UP (ref 43–77)
ORGANISM # SPEC MICROSCOPIC CNT: SIGNIFICANT CHANGE UP
ORGANISM # SPEC MICROSCOPIC CNT: SIGNIFICANT CHANGE UP
PLATELET # BLD AUTO: 184 K/UL — SIGNIFICANT CHANGE UP (ref 150–400)
POTASSIUM SERPL-MCNC: 3.8 MMOL/L — SIGNIFICANT CHANGE UP (ref 3.5–5.3)
POTASSIUM SERPL-SCNC: 3.8 MMOL/L — SIGNIFICANT CHANGE UP (ref 3.5–5.3)
PROTHROM AB SERPL-ACNC: 17.7 SEC — HIGH (ref 10–13.1)
RBC # BLD: 3.98 M/UL — SIGNIFICANT CHANGE UP (ref 3.8–5.2)
RBC # FLD: 15.2 % — HIGH (ref 10.3–14.5)
SODIUM SERPL-SCNC: 141 MMOL/L — SIGNIFICANT CHANGE UP (ref 135–145)
SPECIMEN SOURCE: SIGNIFICANT CHANGE UP
TOTAL CHOLESTEROL/HDL RATIO MEASUREMENT: 4.6 RATIO — SIGNIFICANT CHANGE UP (ref 3.3–7.1)
TRIGL SERPL-MCNC: 100 MG/DL — SIGNIFICANT CHANGE UP (ref 10–149)
WBC # BLD: 5.23 K/UL — SIGNIFICANT CHANGE UP (ref 3.8–10.5)
WBC # FLD AUTO: 5.23 K/UL — SIGNIFICANT CHANGE UP (ref 3.8–10.5)

## 2018-06-06 PROCEDURE — 82947 ASSAY GLUCOSE BLOOD QUANT: CPT

## 2018-06-06 PROCEDURE — 83036 HEMOGLOBIN GLYCOSYLATED A1C: CPT

## 2018-06-06 PROCEDURE — 99291 CRITICAL CARE FIRST HOUR: CPT

## 2018-06-06 PROCEDURE — 97162 PT EVAL MOD COMPLEX 30 MIN: CPT

## 2018-06-06 PROCEDURE — 85014 HEMATOCRIT: CPT

## 2018-06-06 PROCEDURE — 84132 ASSAY OF SERUM POTASSIUM: CPT

## 2018-06-06 PROCEDURE — 80061 LIPID PANEL: CPT

## 2018-06-06 PROCEDURE — 82435 ASSAY OF BLOOD CHLORIDE: CPT

## 2018-06-06 PROCEDURE — 82330 ASSAY OF CALCIUM: CPT

## 2018-06-06 PROCEDURE — 70551 MRI BRAIN STEM W/O DYE: CPT

## 2018-06-06 PROCEDURE — 87086 URINE CULTURE/COLONY COUNT: CPT

## 2018-06-06 PROCEDURE — 80048 BASIC METABOLIC PNL TOTAL CA: CPT

## 2018-06-06 PROCEDURE — 85610 PROTHROMBIN TIME: CPT

## 2018-06-06 PROCEDURE — 83605 ASSAY OF LACTIC ACID: CPT

## 2018-06-06 PROCEDURE — 81001 URINALYSIS AUTO W/SCOPE: CPT

## 2018-06-06 PROCEDURE — 82803 BLOOD GASES ANY COMBINATION: CPT

## 2018-06-06 PROCEDURE — 84295 ASSAY OF SERUM SODIUM: CPT

## 2018-06-06 PROCEDURE — 82565 ASSAY OF CREATININE: CPT

## 2018-06-06 PROCEDURE — 85027 COMPLETE CBC AUTOMATED: CPT

## 2018-06-06 PROCEDURE — 87186 SC STD MICRODIL/AGAR DIL: CPT

## 2018-06-06 PROCEDURE — 85730 THROMBOPLASTIN TIME PARTIAL: CPT

## 2018-06-06 PROCEDURE — 84484 ASSAY OF TROPONIN QUANT: CPT

## 2018-06-06 PROCEDURE — 87040 BLOOD CULTURE FOR BACTERIA: CPT

## 2018-06-06 PROCEDURE — 80053 COMPREHEN METABOLIC PANEL: CPT

## 2018-06-06 PROCEDURE — 99233 SBSQ HOSP IP/OBS HIGH 50: CPT

## 2018-06-06 PROCEDURE — 82962 GLUCOSE BLOOD TEST: CPT

## 2018-06-06 PROCEDURE — 70450 CT HEAD/BRAIN W/O DYE: CPT

## 2018-06-06 PROCEDURE — 93005 ELECTROCARDIOGRAM TRACING: CPT

## 2018-06-06 RX ORDER — WARFARIN SODIUM 2.5 MG/1
1 TABLET ORAL
Qty: 0 | Refills: 0 | COMMUNITY

## 2018-06-06 RX ORDER — LOSARTAN POTASSIUM 100 MG/1
50 TABLET, FILM COATED ORAL ONCE
Qty: 0 | Refills: 0 | Status: DISCONTINUED | OUTPATIENT
Start: 2018-06-06 | End: 2018-06-06

## 2018-06-06 RX ORDER — ATORVASTATIN CALCIUM 80 MG/1
1 TABLET, FILM COATED ORAL
Qty: 30 | Refills: 0
Start: 2018-06-06 | End: 2018-07-05

## 2018-06-06 NOTE — PROGRESS NOTE ADULT - SUBJECTIVE AND OBJECTIVE BOX
CC: f/u for bacteremia    Patient reports: she is sleepy,no localizing complaints    REVIEW OF SYSTEMS:  All other review of systems negative (Comprehensive ROS)    Antimicrobials Day #  off:    Other Medications Reviewed    T(F): 98.2 (06-06-18 @ 08:06), Max: 98.2 (06-05-18 @ 21:47)  HR: 61 (06-06-18 @ 08:06)  BP: 169/97 (06-06-18 @ 08:06)  RR: 20 (06-06-18 @ 08:06)  SpO2: 96% (06-06-18 @ 08:06)  Wt(kg): --    PHYSICAL EXAM:  General: alert, no acute distress,sleepy  Eyes:  anicteric, no conjunctival injection, no discharge  Oropharynx: no lesions or injection 	  Neck: supple, without adenopathy  Lungs: clear to auscultation  Heart: regular rate and rhythm; no murmur, rubs or gallops  Abdomen: soft, nondistended, nontender, without mass or organomegaly  Skin: no lesions  Extremities: no clubbing, cyanosis, or edema  Neurologic: alert, mild confusion, moves all extremities    LAB RESULTS:                        11.4   5.23  )-----------( 184      ( 06 Jun 2018 08:21 )             35.8     06-06    141  |  105  |  25<H>  ----------------------------<  93  3.8   |  23  |  1.27    Ca    9.2      06 Jun 2018 06:00          MICROBIOLOGY:  RECENT CULTURES:  06-04 @ 14:30 .Blood Blood     Growth in anaerobic bottle: Bacillus species not anthracis  "Susceptibilities not performed"    Growth in anaerobic bottle: Gram Positive Rods    06-04 @ 13:44 .Urine Clean Catch (Midstream)     50,000 - 99,000 CFU/mL Enterococcus faecalis  <10,000 CFU/ml Normal Urogenital jana present          RADIOLOGY REVIEWED:

## 2018-06-06 NOTE — PROGRESS NOTE ADULT - ASSESSMENT
86-year-old right-handed white lady first evaluated at [facility] on 6/4/18 with acute speech disturbance. She has a history of a atrial fibrillation on Coumadin. In 2013, she had an episode of speech disturbance, diagnosed as probable aphasia which subsequently resolved. On 6/4/18, she had a similar episode of speech disturbance, "not making sense". There was associated severe anxiety, she complained that she "couldn't see", and shaking of both arms with a normal level of consciousness. She had a recent UTI. She has severe anxiety and depression, and her psychiatrist has recently changed her medications which according to her daughters have resulted in significantly worsened anxiety. At baseline, she is primarily wheelchair bound, and can take a couple of steps with assistance. Currently her symptoms are back to baseline.    IMPRESSION: Transient aphasia due to Cerebral ischemia without cerebral infarction (TIA) secondary to cardioembolism caused by atrial fibrillation     NEURO: Neurologically with improvement since admission, continue close monitoring for neurologic deterioration, permissive HTN to gradual normotension, Started on Atorvastatin- titrate statin to LDL goal less than 70, MRI Brain w/o noted above. Physical therapy recommending home with home PT.    ANTITHROMBOTIC THERAPY: Continue on Warfarin for atrial fibrillation and secondary stroke prevention. Consider changing to NOAC as outpatient.     PULMONARY: protecting airway, saturating well, NRD    CARDIOVASCULAR: TTE ordered to evaluate for valvular disease and structural cardiac source of embolism-can be done as out-patient, cardiac monitoring shows Afib     SBP goal: Permissive HTN to gradual normotension     GASTROINTESTINAL:  dysphagia screen passed on Pureed diet       Diet: Purred     RENAL: BUN/Cr without acute change, good urine output      Na Goal: Greater than 135     Drew: N    HEMATOLOGY: H/H ? Platelets ? ; no signs or symptoms of bleeding noted.     DVT ppx: on Warfarin so no need for further DVT ppx    ID: afebrile, without leukocytosis; ID consulted for enterococcus in urine is probably best viewed as a colonizer and recommended to hold antibiotics. ID follow up 6/6     OTHER: Plan discussed at length with daughter at bedside, all questions answered and concerns addressed     DISPOSITION: home with home PT when workup is complete      CORE MEASURES:        Admission NIHSS: 3     TPA: [] YES [x] NO      LDL/HDL: 147/57     Depression Screen: 0     Statin Therapy: Yes     Dysphagia Screen: [x] PASS [] FAIL     Smoking [] YES [x] NO (smoker in the past)     Afib [x] YES [] NO     Stroke Education [X] YES [] NO 86-year-old right-handed white lady first evaluated at [facility] on 6/4/18 with acute speech disturbance. She has a history of a atrial fibrillation on Coumadin. In 2013, she had an episode of speech disturbance, diagnosed as probable aphasia which subsequently resolved. On 6/4/18, she had a similar episode of speech disturbance, "not making sense". There was associated severe anxiety, she complained that she "couldn't see", and shaking of both arms with a normal level of consciousness. She had a recent UTI. She has severe anxiety and depression, and her psychiatrist has recently changed her medications which according to her daughters have resulted in significantly worsened anxiety. At baseline, she is primarily wheelchair bound, and can take a couple of steps with assistance. Currently her symptoms are back to baseline.    IMPRESSION: Transient aphasia due to Cerebral ischemia without cerebral infarction (TIA) secondary to cardioembolism caused by atrial fibrillation     NEURO: Neurologically with improvement since admission, permissive HTN to gradual normotension, Started on Atorvastatin- titrate statin to LDL goal less than 70, MRI Brain w/o noted above. Physical therapy recommending home with home PT.    ANTITHROMBOTIC THERAPY: Continue on Warfarin for atrial fibrillation and secondary stroke prevention. Consider changing to NOAC as outpatient.     PULMONARY: protecting airway, saturating well, NRD    CARDIOVASCULAR: TTE ordered to evaluate for valvular disease and structural cardiac source of embolism-can be done as out-patient, cardiac monitoring shows Afib     SBP goal: Permissive HTN to gradual normotension     GASTROINTESTINAL: dysphagia screen passed on Pureed diet       Diet: Purred     RENAL: BUN/Cr without acute change, good urine output      Na Goal: Greater than 135     Drew: N    HEMATOLOGY: H/H 11.4/35.8 Platelets 184 ; no signs or symptoms of bleeding noted.     DVT ppx: on Warfarin so no need for further DVT ppx    ID: afebrile, without leukocytosis; ID consulted for enterococcus in urine is probably best viewed as a colonizer and recommended to avoid antibiotics. cleared by ID; Will encourage outpatient followup.       OTHER: Plan discussed at length with daughter at bedside, all questions answered and concerns addressed     DISPOSITION: home with home PT when workup is complete    CORE MEASURES:        Admission NIHSS: 3     TPA: [] YES [x] NO      LDL/HDL: 147/57     Depression Screen: 0     Statin Therapy: Yes     Dysphagia Screen: [x] PASS [] FAIL     Smoking [] YES [x] NO (smoker in the past)     Afib [x] YES [] NO     Stroke Education [X] YES [] NO

## 2018-06-06 NOTE — PROGRESS NOTE ADULT - ASSESSMENT
85 yo female with IBS,HTN,AF,CVA,who was admitted with change in mental status, clinical concerns for left hemispheric dysfunction.  GPR in blood of unclear significance, while my initial impresssion is that it will be a bacillus species or corynebacteria which are often contaminants,I am reluctant to send home untill we have ID of organism.She has been on antibiotics, came in with focal neurologic findings,and has been receiving antibiotics.I was unable to obtain a history suggestive of any chronic infection,no constitutional symptoms.She is afebrile, normal wbc count,hence little clinical support for a true bacteremia.  Enterococcus in urine is probably best viewed as a colonizer and does not warrant antibiotics.  Suggest:  1.hold antibiotics  2.Bacillus in 1/4 bottles should be viewed as a procurement contaminant.No treatment needed  3.No ID objection to discharge, no indications for antibiotics, reviewed with her daughter and neurology PA

## 2018-06-06 NOTE — PROGRESS NOTE ADULT - SUBJECTIVE AND OBJECTIVE BOX
86 year old RH white lady (was a smoker in the past) with a pmhx of A-fib on coumadin, prior stroke in 2013 (MRI not done at that time), HTN, Gastritis, IBS, anxiety and depression. Pt was at her normal baseline upon awakening and while having breakfast at 7:30am, at 8am on 6/4 the HHA noticed the patient to have a speech disturbance described as slurred speech and not making sense. Also pt had generalized tremors which shortly after resolved and pt was very anxious associated with generalized shaking. Patient completed a ten day course of oral antibiotics two days ago for UTI. She was admitted in 2013 under the stroke service for a similar complaint which was documented as word finding difficulties and aphasia. At that time MRI was deferred and pt was discharged.     SUBJECTIVE: No events overnight.  No new neurologic complaints.      acetaminophen   Tablet 650 milliGRAM(s) Oral every 6 hours PRN  acetaminophen   Tablet. 650 milliGRAM(s) Oral every 6 hours PRN  ascorbic acid 250 milliGRAM(s) Oral daily  atorvastatin 40 milliGRAM(s) Oral at bedtime  cholecalciferol 1000 Unit(s) Oral daily  OLANZapine 10 milliGRAM(s) Oral daily  warfarin 2 milliGRAM(s) Oral <User Schedule>  warfarin 1 milliGRAM(s) Oral <User Schedule>      PHYSICAL EXAM:   Vital Signs Last 24 Hrs  T(C): 36.8 (06 Jun 2018 08:06), Max: 36.8 (05 Jun 2018 21:47)  T(F): 98.2 (06 Jun 2018 08:06), Max: 98.2 (05 Jun 2018 21:47)  HR: 61 (06 Jun 2018 08:06) (61 - 95)  BP: 169/97 (06 Jun 2018 08:06) (142/82 - 169/97)  RR: 20 (06 Jun 2018 08:06) (18 - 20)  SpO2: 96% (06 Jun 2018 08:06) (94% - 98%)    EXAM    LABS:                        12.9   6.2   )-----------( 189      ( 05 Jun 2018 10:15 )             37.9    06-06    141  |  105  |  25<H>  ----------------------------<  93  3.8   |  23  |  1.27    Ca    9.2      06 Jun 2018 06:00    TPro  8.1  /  Alb  4.4  /  TBili  0.4  /  DBili  x   /  AST  18  /  ALT  12  /  AlkPhos  103  06-04  PT/INR - ( 04 Jun 2018 10:17 )   PT: 21.5 sec;   INR: 1.95 ratio         PTT - ( 04 Jun 2018 10:17 )  PTT:41.1 sec  Hemoglobin A1C, Whole Blood: 5.3 % (06-05 @ 14:49)      IMAGING: Reviewed by me.     CT Head No Cont (06.04.18)  No acute major distribution infarct or hemorrhage.  Moderate microvascular ischemic change similar in appearance to the prior   study. Pontine lucency appears to have been present previously.    MRI Head No Cont (06.05.18)    No acute intracranial hemorrhage, mass effect, vasogenic   edema, or evidence of acute territorial infarct. 86 year old RH white lady (was a smoker in the past) with a pmhx of A-fib on coumadin, prior stroke in 2013 (MRI not done at that time), HTN, Gastritis, IBS, anxiety and depression. Pt was at her normal baseline upon awakening and while having breakfast at 7:30am, at 8am on 6/4 the HHA noticed the patient to have a speech disturbance described as slurred speech and not making sense. Also pt had generalized tremors which shortly after resolved and pt was very anxious associated with generalized shaking. Patient completed a ten day course of oral antibiotics two days ago for UTI. She was admitted in 2013 under the stroke service for a similar complaint which was documented as word finding difficulties and aphasia. At that time MRI was deferred and pt was discharged.     SUBJECTIVE: No events overnight.  No new neurologic complaints.      acetaminophen   Tablet 650 milliGRAM(s) Oral every 6 hours PRN  acetaminophen   Tablet. 650 milliGRAM(s) Oral every 6 hours PRN  ascorbic acid 250 milliGRAM(s) Oral daily  atorvastatin 40 milliGRAM(s) Oral at bedtime  cholecalciferol 1000 Unit(s) Oral daily  OLANZapine 10 milliGRAM(s) Oral daily  warfarin 2 milliGRAM(s) Oral <User Schedule>  warfarin 1 milliGRAM(s) Oral <User Schedule>      PHYSICAL EXAM:   Vital Signs Last 24 Hrs  T(C): 36.8 (06 Jun 2018 08:06), Max: 36.8 (05 Jun 2018 21:47)  T(F): 98.2 (06 Jun 2018 08:06), Max: 98.2 (05 Jun 2018 21:47)  HR: 61 (06 Jun 2018 08:06) (61 - 95)  BP: 169/97 (06 Jun 2018 08:06) (142/82 - 169/97)  RR: 20 (06 Jun 2018 08:06) (18 - 20)  SpO2: 96% (06 Jun 2018 08:06) (94% - 98%)    General: No acute distress  HEENT: EOM intact, visual fields full  Abdomen: Soft, nontender, nondistended   Extremities: No edema    NEUROLOGICAL EXAM:  Mental status: Awake, alert, oriented to person, hospital, month, speech fluent, follows simple commands  Cranial Nerves: No facial asymmetry, no nystagmus, no dysarthria, tongue midline  Motor exam: Normal tone, postural tremor, moves all extremities somewhat against gravity 5/5 RUE drift, 5/5 RLE, 5/5 LUE, 4+/5 LLE  Sensation: Intact to light touch   Coordination/ Gait: No dysmetria, gait- deferred    LABS:                        12.9   6.2   )-----------( 189      ( 05 Jun 2018 10:15 )             37.9    06-06    141  |  105  |  25<H>  ----------------------------<  93  3.8   |  23  |  1.27    Ca    9.2      06 Jun 2018 06:00    TPro  8.1  /  Alb  4.4  /  TBili  0.4  /  DBili  x   /  AST  18  /  ALT  12  /  AlkPhos  103  06-04  PT/INR - ( 04 Jun 2018 10:17 )   PT: 21.5 sec;   INR: 1.95 ratio         PTT - ( 04 Jun 2018 10:17 )  PTT:41.1 sec  Hemoglobin A1C, Whole Blood: 5.3 % (06-05 @ 14:49)      IMAGING: Reviewed by me.     CT Head No Cont (06.04.18)  No acute major distribution infarct or hemorrhage.  Moderate microvascular ischemic change similar in appearance to the prior   study. Pontine lucency appears to have been present previously.    MRI Head No Cont (06.05.18)    No acute intracranial hemorrhage, mass effect, vasogenic   edema, or evidence of acute territorial infarct.

## 2018-06-09 LAB
CULTURE RESULTS: SIGNIFICANT CHANGE UP
SPECIMEN SOURCE: SIGNIFICANT CHANGE UP

## 2018-06-14 PROCEDURE — 99214 OFFICE O/P EST MOD 30 MIN: CPT

## 2018-07-05 NOTE — ED ADULT TRIAGE NOTE - MEANS OF ARRIVAL
[facility]ued[facility] Ponce has called requesting a refill of their controlled medication, morphine sulfate IR, for the management of her chronic joint pain. Last office visit date: 04/30/18 with CHRISTOPHER Andino    Date last  was pulled and reviewed : 07/05/18    Was the patient compliant when the above report was pulled? yes    Analgesia: pt reports a 70% pain relief with her current opioid medication regimen    Aberrancies: none noted    ADL's: pt is able to do some things but does require a lot of assistance due to her age      Adverse Reaction: no side effects or adverse reactions reported by pt at this time    Provider's last note and plan of care reviewed? yes  Request forwarded to provider for review.
stretcher

## 2018-07-16 PROCEDURE — 99213 OFFICE O/P EST LOW 20 MIN: CPT

## 2018-09-24 PROCEDURE — 99214 OFFICE O/P EST MOD 30 MIN: CPT

## 2018-11-19 PROCEDURE — 99214 OFFICE O/P EST MOD 30 MIN: CPT

## 2019-02-11 PROCEDURE — 99213 OFFICE O/P EST LOW 20 MIN: CPT

## 2019-03-01 ENCOUNTER — INPATIENT (INPATIENT)
Facility: HOSPITAL | Age: 84
LOS: 7 days | Discharge: HOME | DRG: 871 | End: 2019-03-09
Attending: INTERNAL MEDICINE | Admitting: INTERNAL MEDICINE
Payer: MEDICARE

## 2019-03-01 VITALS
DIASTOLIC BLOOD PRESSURE: 99 MMHG | HEART RATE: 100 BPM | TEMPERATURE: 100 F | RESPIRATION RATE: 20 BRPM | SYSTOLIC BLOOD PRESSURE: 165 MMHG | OXYGEN SATURATION: 98 %

## 2019-03-01 DIAGNOSIS — R41.82 ALTERED MENTAL STATUS, UNSPECIFIED: ICD-10-CM

## 2019-03-01 DIAGNOSIS — J18.9 PNEUMONIA, UNSPECIFIED ORGANISM: ICD-10-CM

## 2019-03-01 DIAGNOSIS — J06.9 ACUTE UPPER RESPIRATORY INFECTION, UNSPECIFIED: ICD-10-CM

## 2019-03-01 LAB
ALBUMIN SERPL ELPH-MCNC: 3.6 G/DL — SIGNIFICANT CHANGE UP (ref 3.3–5)
ALBUMIN SERPL ELPH-MCNC: 3.8 G/DL — SIGNIFICANT CHANGE UP (ref 3.3–5)
ALP SERPL-CCNC: 78 U/L — SIGNIFICANT CHANGE UP (ref 40–120)
ALP SERPL-CCNC: 81 U/L — SIGNIFICANT CHANGE UP (ref 40–120)
ALT FLD-CCNC: 13 U/L — SIGNIFICANT CHANGE UP (ref 10–45)
ALT FLD-CCNC: 14 U/L — SIGNIFICANT CHANGE UP (ref 10–45)
ANION GAP SERPL CALC-SCNC: 13 MMOL/L — SIGNIFICANT CHANGE UP (ref 5–17)
ANION GAP SERPL CALC-SCNC: 16 MMOL/L — SIGNIFICANT CHANGE UP (ref 5–17)
APPEARANCE UR: ABNORMAL
APTT BLD: 32.5 SEC — SIGNIFICANT CHANGE UP (ref 27.5–36.3)
APTT BLD: 33.2 SEC — SIGNIFICANT CHANGE UP (ref 27.5–36.3)
AST SERPL-CCNC: 37 U/L — SIGNIFICANT CHANGE UP (ref 10–40)
AST SERPL-CCNC: 50 U/L — HIGH (ref 10–40)
BACTERIA # UR AUTO: NEGATIVE — SIGNIFICANT CHANGE UP
BASE EXCESS BLDA CALC-SCNC: 3.3 MMOL/L — HIGH (ref -2–2)
BASOPHILS # BLD AUTO: 0 K/UL — SIGNIFICANT CHANGE UP (ref 0–0.2)
BASOPHILS # BLD AUTO: 0 K/UL — SIGNIFICANT CHANGE UP (ref 0–0.2)
BASOPHILS NFR BLD AUTO: 0 % — SIGNIFICANT CHANGE UP (ref 0–2)
BASOPHILS NFR BLD AUTO: 0.1 % — SIGNIFICANT CHANGE UP (ref 0–2)
BILIRUB SERPL-MCNC: 0.5 MG/DL — SIGNIFICANT CHANGE UP (ref 0.2–1.2)
BILIRUB SERPL-MCNC: 0.5 MG/DL — SIGNIFICANT CHANGE UP (ref 0.2–1.2)
BILIRUB UR-MCNC: NEGATIVE — SIGNIFICANT CHANGE UP
BUN SERPL-MCNC: 24 MG/DL — HIGH (ref 7–23)
BUN SERPL-MCNC: 26 MG/DL — HIGH (ref 7–23)
CALCIUM SERPL-MCNC: 9.1 MG/DL — SIGNIFICANT CHANGE UP (ref 8.4–10.5)
CALCIUM SERPL-MCNC: 9.4 MG/DL — SIGNIFICANT CHANGE UP (ref 8.4–10.5)
CHLORIDE SERPL-SCNC: 104 MMOL/L — SIGNIFICANT CHANGE UP (ref 96–108)
CHLORIDE SERPL-SCNC: 104 MMOL/L — SIGNIFICANT CHANGE UP (ref 96–108)
CO2 BLDA-SCNC: 29 MMOL/L — SIGNIFICANT CHANGE UP (ref 22–30)
CO2 SERPL-SCNC: 22 MMOL/L — SIGNIFICANT CHANGE UP (ref 22–31)
CO2 SERPL-SCNC: 24 MMOL/L — SIGNIFICANT CHANGE UP (ref 22–31)
COLOR SPEC: YELLOW — SIGNIFICANT CHANGE UP
COMMENT - URINE: SIGNIFICANT CHANGE UP
CREAT SERPL-MCNC: 1.14 MG/DL — SIGNIFICANT CHANGE UP (ref 0.5–1.3)
CREAT SERPL-MCNC: 1.16 MG/DL — SIGNIFICANT CHANGE UP (ref 0.5–1.3)
DIFF PNL FLD: ABNORMAL
EOSINOPHIL # BLD AUTO: 0 K/UL — SIGNIFICANT CHANGE UP (ref 0–0.5)
EOSINOPHIL # BLD AUTO: 0 K/UL — SIGNIFICANT CHANGE UP (ref 0–0.5)
EOSINOPHIL NFR BLD AUTO: 0.7 % — SIGNIFICANT CHANGE UP (ref 0–6)
EOSINOPHIL NFR BLD AUTO: 0.7 % — SIGNIFICANT CHANGE UP (ref 0–6)
EPI CELLS # UR: 9 /HPF — HIGH
GAS PNL BLDA: SIGNIFICANT CHANGE UP
GAS PNL BLDV: SIGNIFICANT CHANGE UP
GLUCOSE BLDC GLUCOMTR-MCNC: 105 MG/DL — HIGH (ref 70–99)
GLUCOSE BLDC GLUCOMTR-MCNC: 106 MG/DL — HIGH (ref 70–99)
GLUCOSE SERPL-MCNC: 100 MG/DL — HIGH (ref 70–99)
GLUCOSE SERPL-MCNC: 120 MG/DL — HIGH (ref 70–99)
GLUCOSE UR QL: NEGATIVE — SIGNIFICANT CHANGE UP
GRAN CASTS # UR COMP ASSIST: 1 /LPF — SIGNIFICANT CHANGE UP
HCO3 BLDA-SCNC: 28 MMOL/L — SIGNIFICANT CHANGE UP (ref 21–29)
HCT VFR BLD CALC: 33.5 % — LOW (ref 34.5–45)
HCT VFR BLD CALC: 35.4 % — SIGNIFICANT CHANGE UP (ref 34.5–45)
HGB BLD-MCNC: 11.5 G/DL — SIGNIFICANT CHANGE UP (ref 11.5–15.5)
HGB BLD-MCNC: 12.2 G/DL — SIGNIFICANT CHANGE UP (ref 11.5–15.5)
HMPV RNA SPEC QL NAA+PROBE: DETECTED
HYALINE CASTS # UR AUTO: 8 /LPF — HIGH (ref 0–2)
INR BLD: 1.96 RATIO — HIGH (ref 0.88–1.16)
INR BLD: 1.98 RATIO — HIGH (ref 0.88–1.16)
KETONES UR-MCNC: NEGATIVE — SIGNIFICANT CHANGE UP
LACTATE BLDA-MCNC: 0.8 MMOL/L — SIGNIFICANT CHANGE UP (ref 0.7–2)
LEUKOCYTE ESTERASE UR-ACNC: NEGATIVE — SIGNIFICANT CHANGE UP
LYMPHOCYTES # BLD AUTO: 0.5 K/UL — LOW (ref 1–3.3)
LYMPHOCYTES # BLD AUTO: 0.7 K/UL — LOW (ref 1–3.3)
LYMPHOCYTES # BLD AUTO: 13.4 % — SIGNIFICANT CHANGE UP (ref 13–44)
LYMPHOCYTES # BLD AUTO: 8.4 % — LOW (ref 13–44)
MAGNESIUM SERPL-MCNC: 1.9 MG/DL — SIGNIFICANT CHANGE UP (ref 1.6–2.6)
MCHC RBC-ENTMCNC: 30.1 PG — SIGNIFICANT CHANGE UP (ref 27–34)
MCHC RBC-ENTMCNC: 30.7 PG — SIGNIFICANT CHANGE UP (ref 27–34)
MCHC RBC-ENTMCNC: 34.3 GM/DL — SIGNIFICANT CHANGE UP (ref 32–36)
MCHC RBC-ENTMCNC: 34.4 GM/DL — SIGNIFICANT CHANGE UP (ref 32–36)
MCV RBC AUTO: 87.8 FL — SIGNIFICANT CHANGE UP (ref 80–100)
MCV RBC AUTO: 89.2 FL — SIGNIFICANT CHANGE UP (ref 80–100)
MONOCYTES # BLD AUTO: 0.3 K/UL — SIGNIFICANT CHANGE UP (ref 0–0.9)
MONOCYTES # BLD AUTO: 0.4 K/UL — SIGNIFICANT CHANGE UP (ref 0–0.9)
MONOCYTES NFR BLD AUTO: 6 % — SIGNIFICANT CHANGE UP (ref 2–14)
MONOCYTES NFR BLD AUTO: 6.6 % — SIGNIFICANT CHANGE UP (ref 2–14)
NEUTROPHILS # BLD AUTO: 3.9 K/UL — SIGNIFICANT CHANGE UP (ref 1.8–7.4)
NEUTROPHILS # BLD AUTO: 5.1 K/UL — SIGNIFICANT CHANGE UP (ref 1.8–7.4)
NEUTROPHILS NFR BLD AUTO: 79.2 % — HIGH (ref 43–77)
NEUTROPHILS NFR BLD AUTO: 84.9 % — HIGH (ref 43–77)
NITRITE UR-MCNC: NEGATIVE — SIGNIFICANT CHANGE UP
PCO2 BLDA: 45 MMHG — SIGNIFICANT CHANGE UP (ref 32–46)
PH BLDA: 7.41 — SIGNIFICANT CHANGE UP (ref 7.35–7.45)
PH UR: 6 — SIGNIFICANT CHANGE UP (ref 5–8)
PHOSPHATE SERPL-MCNC: 3.7 MG/DL — SIGNIFICANT CHANGE UP (ref 2.5–4.5)
PLATELET # BLD AUTO: 112 K/UL — LOW (ref 150–400)
PLATELET # BLD AUTO: 132 K/UL — LOW (ref 150–400)
PO2 BLDA: 54 MMHG — LOW (ref 74–108)
POTASSIUM SERPL-MCNC: 3.9 MMOL/L — SIGNIFICANT CHANGE UP (ref 3.5–5.3)
POTASSIUM SERPL-MCNC: 5 MMOL/L — SIGNIFICANT CHANGE UP (ref 3.5–5.3)
POTASSIUM SERPL-SCNC: 3.9 MMOL/L — SIGNIFICANT CHANGE UP (ref 3.5–5.3)
POTASSIUM SERPL-SCNC: 5 MMOL/L — SIGNIFICANT CHANGE UP (ref 3.5–5.3)
PROT SERPL-MCNC: 7.4 G/DL — SIGNIFICANT CHANGE UP (ref 6–8.3)
PROT SERPL-MCNC: 7.5 G/DL — SIGNIFICANT CHANGE UP (ref 6–8.3)
PROT UR-MCNC: 100 — SIGNIFICANT CHANGE UP
PROTHROM AB SERPL-ACNC: 22.8 SEC — HIGH (ref 10–12.9)
PROTHROM AB SERPL-ACNC: 23.1 SEC — HIGH (ref 10–12.9)
RAPID RVP RESULT: DETECTED
RBC # BLD: 3.76 M/UL — LOW (ref 3.8–5.2)
RBC # BLD: 4.04 M/UL — SIGNIFICANT CHANGE UP (ref 3.8–5.2)
RBC # FLD: 14.1 % — SIGNIFICANT CHANGE UP (ref 10.3–14.5)
RBC # FLD: 14.2 % — SIGNIFICANT CHANGE UP (ref 10.3–14.5)
RBC CASTS # UR COMP ASSIST: 4 /HPF — SIGNIFICANT CHANGE UP (ref 0–4)
SAO2 % BLDA: 87 % — LOW (ref 92–96)
SODIUM SERPL-SCNC: 141 MMOL/L — SIGNIFICANT CHANGE UP (ref 135–145)
SODIUM SERPL-SCNC: 142 MMOL/L — SIGNIFICANT CHANGE UP (ref 135–145)
SP GR SPEC: 1.03 — HIGH (ref 1.01–1.02)
TROPONIN T, HIGH SENSITIVITY RESULT: 33 NG/L — SIGNIFICANT CHANGE UP (ref 0–51)
UROBILINOGEN FLD QL: NEGATIVE — SIGNIFICANT CHANGE UP
WBC # BLD: 4.9 K/UL — SIGNIFICANT CHANGE UP (ref 3.8–10.5)
WBC # BLD: 6 K/UL — SIGNIFICANT CHANGE UP (ref 3.8–10.5)
WBC # FLD AUTO: 4.9 K/UL — SIGNIFICANT CHANGE UP (ref 3.8–10.5)
WBC # FLD AUTO: 6 K/UL — SIGNIFICANT CHANGE UP (ref 3.8–10.5)
WBC UR QL: 3 /HPF — SIGNIFICANT CHANGE UP (ref 0–5)

## 2019-03-01 PROCEDURE — 99285 EMERGENCY DEPT VISIT HI MDM: CPT | Mod: GC,25

## 2019-03-01 PROCEDURE — 99223 1ST HOSP IP/OBS HIGH 75: CPT | Mod: GC

## 2019-03-01 PROCEDURE — 71250 CT THORAX DX C-: CPT | Mod: 26

## 2019-03-01 PROCEDURE — 93010 ELECTROCARDIOGRAM REPORT: CPT | Mod: GC

## 2019-03-01 PROCEDURE — 71045 X-RAY EXAM CHEST 1 VIEW: CPT | Mod: 26

## 2019-03-01 PROCEDURE — 99233 SBSQ HOSP IP/OBS HIGH 50: CPT | Mod: GC

## 2019-03-01 PROCEDURE — 70450 CT HEAD/BRAIN W/O DYE: CPT | Mod: 26

## 2019-03-01 RX ORDER — IPRATROPIUM/ALBUTEROL SULFATE 18-103MCG
3 AEROSOL WITH ADAPTER (GRAM) INHALATION EVERY 6 HOURS
Qty: 0 | Refills: 0 | Status: DISCONTINUED | OUTPATIENT
Start: 2019-03-01 | End: 2019-03-09

## 2019-03-01 RX ORDER — ASCORBIC ACID 60 MG
1 TABLET,CHEWABLE ORAL
Qty: 0 | Refills: 0 | COMMUNITY

## 2019-03-01 RX ORDER — WARFARIN SODIUM 2.5 MG/1
2 TABLET ORAL ONCE
Qty: 0 | Refills: 0 | Status: COMPLETED | OUTPATIENT
Start: 2019-03-01 | End: 2019-03-01

## 2019-03-01 RX ORDER — AZITHROMYCIN 500 MG/1
500 TABLET, FILM COATED ORAL ONCE
Qty: 0 | Refills: 0 | Status: COMPLETED | OUTPATIENT
Start: 2019-03-01 | End: 2019-03-01

## 2019-03-01 RX ORDER — SODIUM CHLORIDE 9 MG/ML
1000 INJECTION, SOLUTION INTRAVENOUS
Qty: 0 | Refills: 0 | Status: DISCONTINUED | OUTPATIENT
Start: 2019-03-01 | End: 2019-03-03

## 2019-03-01 RX ORDER — SERTRALINE 25 MG/1
100 TABLET, FILM COATED ORAL DAILY
Qty: 0 | Refills: 0 | Status: DISCONTINUED | OUTPATIENT
Start: 2019-03-01 | End: 2019-03-01

## 2019-03-01 RX ORDER — OLANZAPINE 15 MG/1
1 TABLET, FILM COATED ORAL
Qty: 0 | Refills: 0 | COMMUNITY

## 2019-03-01 RX ORDER — CEFTRIAXONE 500 MG/1
1 INJECTION, POWDER, FOR SOLUTION INTRAMUSCULAR; INTRAVENOUS EVERY 24 HOURS
Qty: 0 | Refills: 0 | Status: DISCONTINUED | OUTPATIENT
Start: 2019-03-02 | End: 2019-03-08

## 2019-03-01 RX ORDER — AZITHROMYCIN 500 MG/1
500 TABLET, FILM COATED ORAL EVERY 24 HOURS
Qty: 0 | Refills: 0 | Status: DISCONTINUED | OUTPATIENT
Start: 2019-03-02 | End: 2019-03-02

## 2019-03-01 RX ORDER — OLANZAPINE 15 MG/1
10 TABLET, FILM COATED ORAL AT BEDTIME
Qty: 0 | Refills: 0 | Status: DISCONTINUED | OUTPATIENT
Start: 2019-03-01 | End: 2019-03-09

## 2019-03-01 RX ORDER — OLANZAPINE 15 MG/1
10 TABLET, FILM COATED ORAL AT BEDTIME
Qty: 0 | Refills: 0 | Status: DISCONTINUED | OUTPATIENT
Start: 2019-03-01 | End: 2019-03-01

## 2019-03-01 RX ORDER — CEFTRIAXONE 500 MG/1
1 INJECTION, POWDER, FOR SOLUTION INTRAMUSCULAR; INTRAVENOUS ONCE
Qty: 0 | Refills: 0 | Status: COMPLETED | OUTPATIENT
Start: 2019-03-01 | End: 2019-03-01

## 2019-03-01 RX ORDER — ACETAMINOPHEN 500 MG
1000 TABLET ORAL ONCE
Qty: 0 | Refills: 0 | Status: COMPLETED | OUTPATIENT
Start: 2019-03-01 | End: 2019-03-01

## 2019-03-01 RX ORDER — AZITHROMYCIN 500 MG/1
TABLET, FILM COATED ORAL
Qty: 0 | Refills: 0 | Status: DISCONTINUED | OUTPATIENT
Start: 2019-03-01 | End: 2019-03-02

## 2019-03-01 RX ORDER — CEFTRIAXONE 500 MG/1
INJECTION, POWDER, FOR SOLUTION INTRAMUSCULAR; INTRAVENOUS
Qty: 0 | Refills: 0 | Status: DISCONTINUED | OUTPATIENT
Start: 2019-03-01 | End: 2019-03-08

## 2019-03-01 RX ORDER — LOSARTAN POTASSIUM 100 MG/1
50 TABLET, FILM COATED ORAL DAILY
Qty: 0 | Refills: 0 | Status: DISCONTINUED | OUTPATIENT
Start: 2019-03-01 | End: 2019-03-09

## 2019-03-01 RX ADMIN — CEFTRIAXONE 100 GRAM(S): 500 INJECTION, POWDER, FOR SOLUTION INTRAMUSCULAR; INTRAVENOUS at 19:02

## 2019-03-01 RX ADMIN — Medication 110 MILLIGRAM(S): at 13:04

## 2019-03-01 RX ADMIN — Medication 400 MILLIGRAM(S): at 12:30

## 2019-03-01 RX ADMIN — WARFARIN SODIUM 2 MILLIGRAM(S): 2.5 TABLET ORAL at 21:16

## 2019-03-01 RX ADMIN — SODIUM CHLORIDE 70 MILLILITER(S): 9 INJECTION, SOLUTION INTRAVENOUS at 20:59

## 2019-03-01 RX ADMIN — AZITHROMYCIN 250 MILLIGRAM(S): 500 TABLET, FILM COATED ORAL at 20:59

## 2019-03-01 NOTE — CONSULT NOTE ADULT - SUBJECTIVE AND OBJECTIVE BOX
PULMONARY CONSULT    HPI: 86 y/o F with PMH of CVA (no residual defecit), HTN, Afib (on coumadin), psychotic depression, wheelchair bound following fall last year, frequent UTI, baseline mental status oriented x3 per family presents with cough, fever x2 days then became confused then more somnolent today. Started on doxycycline per her PMD yesterday. Found to have +hMPV. In ER patient obtunded, minimally responsive. VBG with normal pH, no evidence of CO2 narcosis. 95% on 2L NC    PAST MEDICAL & SURGICAL HISTORY:  IBS (irritable bowel syndrome)  Gastritis  Atrial fibrillation  CVA (cerebral infarction)  Smoking  Hypertension  No significant past surgical history    Allergies    Levaquin (Faint)  penicillin (Faint)  sulfa drugs (Unknown)    Intolerances    Lorabid (Faint)    FAMILY HISTORY: Non-contributory     Social history: Former smoker   Review of Systems: Unable to obtain, patient non-verbal      Medications:  MEDICATIONS  (STANDING):    MEDICATIONS  (PRN):            Vital Signs Last 24 Hrs  T(C): 38.4 (01 Mar 2019 12:05), Max: 38.4 (01 Mar 2019 12:05)  T(F): 101.2 (01 Mar 2019 12:05), Max: 101.2 (01 Mar 2019 12:05)  HR: 101 (01 Mar 2019 12:05) (100 - 101)  BP: 154/86 (01 Mar 2019 12:05) (154/86 - 165/99)  BP(mean): --  RR: 16 (01 Mar 2019 12:05) (16 - 20)  SpO2: 96% (01 Mar 2019 12:06) (89% - 98%) on 2L NC      VBG pH 7.42 - @ 12:33  VBG pCO2 42 - @ 12:33  VBG O2 sat 86 - @ 12:33  VBG lactate 1.1 - @ 12:33            LABS:                        12.2   6.0   )-----------( 132      ( 01 Mar 2019 12:32 )             35.4     03-    142  |  104  |  24<H>  ----------------------------<  120<H>  3.9   |  22  |  1.14    Ca    9.4      01 Mar 2019 12:32    TPro  7.5  /  Alb  3.8  /  TBili  0.5  /  DBili  x   /  AST  37  /  ALT  13  /  AlkPhos  81  03-          CAPILLARY BLOOD GLUCOSE        PT/INR - ( 01 Mar 2019 12:32 )   PT: 23.1 sec;   INR: 1.98 ratio         PTT - ( 01 Mar 2019 12:32 )  PTT:32.5 sec  Urinalysis Basic - ( 01 Mar 2019 13:03 )    Color: Yellow / Appearance: Slightly Turbid / S.026 / pH: x  Gluc: x / Ketone: Negative  / Bili: Negative / Urobili: Negative   Blood: x / Protein: 100 / Nitrite: Negative   Leuk Esterase: Negative / RBC: 4 /hpf / WBC 3 /HPF   Sq Epi: x / Non Sq Epi: 9 /hpf / Bacteria: Negative                    CULTURES: (if applicable)  RVP: +hMPV      Physical Examination:    General: No acute distress.      HEENT: Pupils equal, reactive to light.  Symmetric.    PULM: Grossly clear     CVS: S1, S2    ABD: Soft, nondistended, nontender, normoactive bowel sounds, no masses    EXT: No edema, nontender    SKIN: Warm and well perfused, no rashes noted.    NEURO: Unarousable, non-verbal  RADIOLOGY REVIEWED  CXR: Low lung volumes, grossly clear

## 2019-03-01 NOTE — ED ADULT NURSE REASSESSMENT NOTE - NS ED NURSE REASSESS COMMENT FT1
Pt. placed on bipap during RRT, pt. taken off bipap by pulmonology MD and NP and placed back on 3 L NC. Pt. remains on cardiac monitor. SONIA Salazare at bedside.

## 2019-03-01 NOTE — ED PROVIDER NOTE - OBJECTIVE STATEMENT
3 days of progressive shortness of breath, was seen by her doctor and started on doxycycline ~24 hours ago. Notes that she has still had progressive worsening of her breathing. No chest pain, no abdominal pain, has not felt feverish but has had some chills. No nausea or vomiting. Has had pneumonia in the past, cannot recall when, is wheelchair bound at home has not exerted herself. No ill contacts in the house.

## 2019-03-01 NOTE — CONSULT NOTE ADULT - ASSESSMENT
86 y/o F with PMH of CVA (no residual defecit), HTN, Afib (on coumadin), psychotic depression, wheelchair bound following fall last year, frequent UTI, baseline mental status oriented x3 per family presents with cough, fever x2 days then became confused then more somnolent today. Started on doxycycline per her PMD yesterday. Found to have +hMPV. In ER patient obtunded, minimally responsive. VBG with normal pH, no evidence of CO2 narcosis. 95% on 2L NC

## 2019-03-01 NOTE — CONSULT NOTE ADULT - ATTENDING COMMENTS
Patient seen and examined.  Agree with resident note as above.  Patient with hx as noted including past CVA with progressing debility at home who presents with progressively worsening mental status, cough, fevers.  Found in ER to have +RVP for hMPV.  In Er was briefly thought to be obtunded, but O2 and CO2 are adequate on ABG and patient now wakes and speaks with vigorous stimulation.  Vitals stable.  Full recs as above and I have edited where appropriate.  Please continue empiric abx, follow cx, maintain sat>92%, continuous pulse ox for safety, suctioning as she requires.  If mental status does not improve with tx of PNA may require further workup.  DNR not DNI.  Daughters are HCP, discussed with them and with primary NPs at bedside.
Pt responsive to daughters questions. Pt with RLL pneumonia ?aspiration + metapneumo virus.  Suggest zithro/ceftriaxone.  CT head. Would hold zyprexia tonight. No co2 narcosis, d/c bipap, place on nc O2 and keep sat>88%.

## 2019-03-01 NOTE — ED ADULT NURSE REASSESSMENT NOTE - NS ED NURSE REASSESS COMMENT FT1
Patient responsive to painful stimuli. NP Leroy made aware of patient's status. NP Leroy seen assessing patient. As per NP order, patient to have head CT and respiratory contacted to obtain ABG. No additional RN interventions required at this time.

## 2019-03-01 NOTE — ED PROVIDER NOTE - PHYSICAL EXAMINATION
Gen: NAD, non-toxic, conversational  Eyes: PERRLA, EOMI   HENT: Normocephalic, atraumatic. External ears normal, no rhinorrhea, moist mucous membranes. Hearing aid in place in left ear, none on right.   CV: RRR, no M/R/G  Resp: bilateral expiratory wheeze, non-labored, speaking without difficulty on room air  Abd: soft, non tender, non rigid, no guarding or rebound tenderness  Back: No CVAT bilaterally, no midline ttp  Skin: dry, wwp   Neuro: AOx3, speech is fluent and appropriate  Psych: Mood concerned, affect euthymic

## 2019-03-01 NOTE — H&P ADULT - HISTORY OF PRESENT ILLNESS
86 y/o Female  with PMH of CVA , HTN, Afib (on coumadin), psychotic depression, wheelchair bound following fall last year, frequent UTI, baseline mental status oriented x3 per family presents with cough, fever x2 days then became confused then more lethargic today   Started on doxycycline per her PMD yesterday  . Found to have +hMPV.

## 2019-03-01 NOTE — H&P ADULT - ASSESSMENT
pt w/ sepsis   pna  viral syndrome  abs as per id  iv fluid/ dehydration  id/ pulm eval  neuro checks  neuro eval  ct head  cont outpt  meds

## 2019-03-01 NOTE — ED ADULT NURSE REASSESSMENT NOTE - NS ED NURSE REASSESS COMMENT FT1
CHIEF COMPLAINT/REASON FOR VISIT:  Altered Mental Status    HPI:  Patient presented to our ED with the above complaint on February 10 at 3:17 PM.  She was brought in by EMS and could provide no history in the emergency room.we do have some partial notes from what appears to be a recent psychiatric admission.    PROBLEM LIST:  Patient Active Problem List    Diagnosis   • Dementia with behavioral disturbance [F03.91]         CURRENT MEDICATIONS:  Prescriptions Prior to Admission   Medication Sig Dispense Refill Last Dose   • aspirin 81 MG chewable tablet Chew 81 mg Daily.   Past Month at Unknown time   • ipratropium-albuterol (DUO-NEB) 0.5-2.5 mg/mL nebulizer Take 3 mL by nebulization Every 4 (Four) Hours As Needed for wheezing or shortness of air.   Past Month at Unknown time   • levothyroxine (SYNTHROID, LEVOTHROID) 100 MCG tablet Take 100 mcg by mouth Daily.   Past Month at Unknown time   • metoprolol tartrate (LOPRESSOR) 50 MG tablet Take 50 mg by mouth 2 (Two) Times a Day.   Past Month at Unknown time   • risperiDONE (risperDAL) 1 MG tablet Take 1 mg by mouth Daily.   Past Month at Unknown time   • risperiDONE (risperDAL) 3 MG tablet Take 3 mg by mouth Every Night.   Past Month at Unknown time   • risperiDONE microspheres (risperDAL CONSTA) 25 MG injection Inject 50 mg into the shoulder, thigh, or buttocks Every 14 (Fourteen) Days.   Past Month at Unknown time   • temazepam (RESTORIL) 15 MG capsule Take 30 mg by mouth At Night As Needed for sleep.   Past Month at Unknown time   • Valproic Acid (DEPAKENE) 250 MG/5ML solution syrup Take 500 mg by mouth Daily.   Past Month at Unknown time   • Valproic Acid (DEPAKENE) 250 MG/5ML solution syrup Take 750 mg by mouth Every Night.   Past Month at Unknown time       ALLERGIES:  Thorazine [chlorpromazine]      PAST MEDICAL/SURGICAL HISTORY:  Past Medical History   Diagnosis Date   • Bipolar affective    • COPD (chronic obstructive pulmonary disease)    • Depression    •  RRT called by SONIA Harper. Disease of thyroid gland    • Dyskinesia    • GERD (gastroesophageal reflux disease)    • Hypertension    • Hyponatremia    • Schizoaffective disorder        History reviewed. No pertinent past surgical history.    Review of Systems   Constitutional: Negative for activity change, appetite change, fatigue and fever.        Patient only answers yes to a single system, but it is difficult for her to attend to all the questions.   HENT: Positive for hearing loss. Negative for congestion, ear discharge, ear pain, facial swelling, nosebleeds, postnasal drip, rhinorrhea, sinus pressure, sore throat, tinnitus and trouble swallowing.    Eyes: Negative for pain, discharge and visual disturbance.   Respiratory: Positive for cough. Negative for shortness of breath and wheezing.    Cardiovascular: Negative for chest pain, palpitations and leg swelling.   Gastrointestinal: Negative for abdominal pain, blood in stool, constipation, diarrhea, nausea and vomiting.   Genitourinary: Negative for difficulty urinating, dyspareunia, dysuria, flank pain, frequency, hematuria, menstrual problem, pelvic pain, urgency, vaginal bleeding and vaginal discharge.   Musculoskeletal: Negative for arthralgias, back pain, joint swelling, myalgias and neck pain.   Skin: Negative for rash and wound.   Neurological: Negative for dizziness, seizures, syncope, weakness, light-headedness and headaches.   Hematological: Negative for adenopathy.       Social History     Social History   • Marital status: Unknown     Spouse name: N/A   • Number of children: N/A   • Years of education: N/A     Occupational History   • Not on file.     Social History Main Topics   • Smoking status: Never Smoker   • Smokeless tobacco: Never Used   • Alcohol use No   • Drug use: Defer   • Sexual activity: Defer     Other Topics Concern   • Not on file     Social History Narrative       History reviewed. No pertinent family history.          Objective     Visit Vitals   • BP  "138/74 (BP Location: Left arm, Patient Position: Sitting)   • Pulse 89   • Temp 97.6 °F (36.4 °C) (Tympanic)   • Resp 20   • Ht 65\" (165.1 cm)   • Wt 160 lb (72.6 kg)   • SpO2 93%   • BMI 26.63 kg/m2       Physical Exam   Constitutional: She appears well-developed and well-nourished.   HENT:   Head: Normocephalic and atraumatic.   Eyes: Conjunctivae and EOM are normal.   Neck: Normal range of motion. Neck supple. No thyromegaly present.   Cardiovascular: Normal rate, regular rhythm and normal heart sounds.  Exam reveals no gallop and no friction rub.    No murmur heard.  Pulmonary/Chest: Effort normal and breath sounds normal. No respiratory distress. She has no wheezes. She has no rales.   Abdominal: Soft. She exhibits no distension and no mass. There is no tenderness. There is no rebound and no guarding.   Musculoskeletal: Normal range of motion.   Lymphadenopathy:     She has no cervical adenopathy.   Neurological: She is alert. She has normal strength and normal reflexes. She displays no tremor and normal reflexes. A cranial nerve deficit is present. No sensory deficit. She exhibits normal muscle tone. Coordination normal. She displays no Babinski's sign on the right side. She displays no Babinski's sign on the left side.   Reflex Scores:       Tricep reflexes are 2+ on the right side and 2+ on the left side.       Bicep reflexes are 2+ on the right side and 2+ on the left side.       Brachioradialis reflexes are 2+ on the right side and 2+ on the left side.       Patellar reflexes are 2+ on the right side and 2+ on the left side.       Achilles reflexes are 2+ on the right side and 2+ on the left side.  Patient is unable to identify an alcohol prep by smell.  She will not cooperate with a full Babinski test, but it does appear to be normal.   Skin: Skin is warm and dry. No rash noted. No erythema.   Nursing note and vitals reviewed.      Dystonia/Tardive Dyskinesia  Absent  Meningeal Signs  Absent    Diagnostic " Studies  CBC, CMP,TSH, UDS, acetaminophen level, salicylate level, ethanol level, U/A all normal except  COMPREHENSIVE METABOLIC PANEL - Abnormal; Notable for the following:    Result Value      Glucose 115 (*)       Creatinine 0.46 (*)       Sodium 132 (*)       eGFR Non  Amer 131 (*)       All other components within normal limits     Narrative:      The MDRD GFR formula is only valid for adults with stable renal function between ages 18 and 70.   ACETAMINOPHEN LEVEL - Abnormal; Notable for the following:      Acetaminophen <10.0 (*)       All other components within normal limits   SALICYLATE LEVEL - Abnormal; Notable for the following:      Salicylate <1.0 (*)       All other components within normal limits   TSH - Abnormal; Notable for the following:      TSH 10.300 (*)       All other components within normal limits   CBC WITH AUTO DIFFERENTIAL - Abnormal; Notable for the following:      Monocytes, Absolute 0.91 (*)       All other components within normal limits   URINALYSIS W/ CULTURE IF INDICATED - Abnormal; Notable for the following:      Leuk Esterase, UA Small (1+) (*)       All other components within normal limits   URINALYSIS, MICROSCOPIC ONLY - Abnormal; Notable for the following:      RBC, UA 0-2 (*)       WBC, UA 6-12 (*)       All other components within normal limits   T4 - Normal     Narrative:      The concentration of Total T4 in samples from pregnant women is erroneously low (20%) when measured using the access Total T4 Assay. Erroneously low results could mask hyperthyroidism. Do not use the Access Total T4 assay as the only marker for evaluating pregnant patients for thyroid disorders.   POCT GLUCOSE FINGERSTICK - Normal   URINE CULTURE   ETHANOL   Urine drug screen all negative    CXR could not be done secondary to patient cooperation.    Assessment/Plan     Patient Active Problem List    Diagnosis   • Dementia with behavioral disturbance [F03.91]     Sodium slightly low at 132.   Plan will be to encourage normal diet.  Hypothyroidism with elevated TSH and normal T4.  Plan will be to continue current supplementation  COPD.  Plan will be to continue her usual inhalers or nebulizer treatments.  hypertension.  Plan will be to continue her current medicines if we can get her to take them.  No convincing evidence of UTI. The notes say the patient had a single dose of antibiotic through the emergency room.  No Further treatment at this time.        Continue Home Meds as ordered. Mental health and pain issues managed per psychiatry.  Further diagnostic studies or intervention based on hospital course.

## 2019-03-01 NOTE — CHART NOTE - NSCHARTNOTEFT_GEN_A_CORE
LATASHA RICH  87y Female  Patient is a 87y old  Female who presents with a chief complaint of AMS (01 Mar 2019 17:27)  S/P RRT for  worsening mental status  ICU consult called by Day team   F/U note    This is a 87yr old Female with PMHx of CVA , HTN, Afib (on coumadin), psychotic depression, wheelchair bound, frequent UTI, presents with cough, fever x2 days then became confused then more lethargic, found to have aspiration PNA, RRT called for worsening MS.     Vital Signs Last 24 Hrs  T(C): 36.4 (01 Mar 2019 20:16), Max: 38.4 (01 Mar 2019 12:05)  T(F): 97.5 (01 Mar 2019 20:16), Max: 101.2 (01 Mar 2019 12:05)  HR: 79 (01 Mar 2019 20:16) (75 - 101)  BP: 159/73 (01 Mar 2019 20:16) (116/63 - 165/99)  BP(mean): --  RR: 19 (01 Mar 2019 20:16) (16 - 20)  SpO2: 98% (01 Mar 2019 20:16) (89% - 99%)  AMS/PNA/HMPV  Evaluated the patient at bedside ICU team at bedside  Patient now aox3, very active, follows verbal commands, responds to questions appropriately  HD stable  Afebrile  C/W ABX/IV fluids  S/P ICU reject, Official ICU recommendations pending  Patient DNR per HCP, (daughter will bring the paper work in am)  Spoke to family and updated plan of care  Will follow closely  Will update with primary team    Olivia Murphy [facility] [place]  [id number] LATASHA RICH  87y Female  Patient is a 87y old  Female who presents with a chief complaint of AMS (01 Mar 2019 17:27)  S/P RRT for  worsening mental status  ICU consult called by Day team   F/U note    This is a 87yr old Female with PMHx of CVA , HTN, Afib (on coumadin), psychotic depression, wheelchair bound, frequent UTI, presents with cough, fever x2 days then became confused then more lethargic, found to have aspiration PNA, RRT called for worsening MS.     Vital Signs Last 24 Hrs  T(C): 36.4 (01 Mar 2019 20:16), Max: 38.4 (01 Mar 2019 12:05)  T(F): 97.5 (01 Mar 2019 20:16), Max: 101.2 (01 Mar 2019 12:05)  HR: 79 (01 Mar 2019 20:16) (75 - 101)  BP: 159/73 (01 Mar 2019 20:16) (116/63 - 165/99)  BP(mean): --  RR: 19 (01 Mar 2019 20:16) (16 - 20)  SpO2: 98% (01 Mar 2019 20:16) (89% - 99%)  AMS/PNA/HMPV  Evaluated the patient at bedside ICU team at bedside  Patient now aox3, very active, follows verbal commands, responds to questions appropriately  HD stable  Afebrile  C/W ABX/IV fluids  Frequent suctioning  Incentive spirometry  S/P ICU reject, ICU recommenadtiosn appreciated  Patient DNR per HCP, (daughter will bring the paper work in am)  Patient on home regimen of Zyprexa and Zoloft, hx of psychosis per family, on hold now in setting of AMS, reassess the pt in am to restart Zyprexa and zoloft.  Spoke to family and updated plan of care  Will follow closely  Will update with primary team    Olivia Murphy [facility] [place]  [id number]

## 2019-03-01 NOTE — CONSULT NOTE ADULT - PROBLEM SELECTOR RECOMMENDATION 9
2nd hMPV  -Check CT chest non-contrast to r/o PNA given patient's lethargy   -Duoneb q6  -Keep sp02>92% on supplemental oxygen

## 2019-03-01 NOTE — ED ADULT NURSE REASSESSMENT NOTE - NS ED NURSE REASSESS COMMENT FT1
Pt. returned from CT scan, remains responsive to painful stimuli. Antibiotics to be given per order. Family at bedside.

## 2019-03-01 NOTE — CONSULT NOTE ADULT - ASSESSMENT
88 y/o Female  with PMH of CVA , HTN, Afib (on coumadin), psychotic depression, wheelchair bound following fall last year, frequent UTI, baseline mental status oriented x3 per family presents with cough, fever x2 days then became confused and more lethargic in the setting of hMPV.    - frequent suctioning for secretions, consider Duonebs with 3% NS  - pts AMS likely 2/2 viral infection  - c/w abx, if pt remains altered with secretions would cover for aspiration  - no need for BiPAP at this time  - no acute changes on CT head, if pt still with AMS pt may need repeat CT in a few days to r/o CVA  - continuos pulse ox  - check bladder scan to ensure pt not retaining  - check TSH  - pts family would like DNR, please fill out MOLST  - pt not in need of MICU level of care at this time, if status changes please re-consult 86 y/o Female  with PMH of CVA , HTN, Afib (on coumadin), psychotic depression, wheelchair bound following fall last year, frequent UTI, baseline mental status oriented x3 per family presents with cough, fever x2 days then became confused and more lethargic in the setting of hMPV and possible superimposed bacterial process. Today was briefly placed on bipap while assessing respiratory status, but now oxygenating and ventilating well on 4L via NC.    - frequent suctioning for secretions, consider Duonebs with 3% NaCl  - pts AMS likely 2/2 sepsis  - c/w abx, if pt remains altered with secretions would cover for aspiration  - no need for BiPAP at this time  - no acute changes on CT head, if pt still with AMS pt may need repeat CT in a few days to r/o CVA  - continuos pulse ox  - check bladder scan to ensure pt not retaining  - check TSH  - pts family would like DNR, please fill out MOLST  - pt not in need of MICU level of care at this time, if status changes please re-consult

## 2019-03-01 NOTE — ED ADULT NURSE REASSESSMENT NOTE - NS ED NURSE REASSESS COMMENT FT1
Straight catheterization done under sterile technique as per MD order, pt. tolerated procedure well, approx. 10 cc of clear yellow urine drained, UA/UC sent per order.

## 2019-03-01 NOTE — ED PROVIDER NOTE - CLINICAL SUMMARY MEDICAL DECISION MAKING FREE TEXT BOX
87F presenting for eval of shortness of breath found to have SIRS+ vitals, will start ed sepsis eval, patient on doxy for past 24 hrs not true antibiotic failure will dose empirically, follow up results, dispo likely admission.

## 2019-03-01 NOTE — ED ADULT NURSE NOTE - OBJECTIVE STATEMENT
87 year old female BIBA, comes to the ED c/o SOB x3days. Patient has a PMH of HTN, CVA, Afib, IBS and Gastritis. Patient also has a past of Pneumonia, but does not remember when. Patient was diagnosed with a UTI yesterday and was placed on Doxycycline. Upon arrival, patient's O2 was 89 on RA, patient placed on 3L of O2 vis NC, O2 sat went back up to 98. Upon on further exam, patient is a/ox3, [place], febrile with a temp of 101.1 orally, VSS. Patient's lung sounds are diminished to the right upper lobe and clear to the other lobes b/l. Patient's abdomen is soft, nontender and nondistended. Patient's peripheral pulses are strong and equal b/l with no edema present. Patient's skin is warm, dry, intact and normal for race. Patient denies CP, n/v/d, abdominal pain, numbness/tingling/diziness. Patient seen and assessed by MD Patton, plan of care discussed and initiated. Patient resting comfortably in stretcher with family at bedside. Patient awaiting to go to Xray. 87 year old female BIBA, comes to the ED c/o SOB x3days associated with a productive cough. Patient has a PMH of HTN, CVA, Afib, IBS and Gastritis. Patient also has a past of Pneumonia, but does not remember when. Patient was diagnosed with a UTI yesterday and was placed on Doxycycline. Upon arrival, patient's O2 was 89 on RA, patient placed on 3L of O2 vis NC, O2 sat went back up to 98. Upon on further exam, patient is a/ox3, [place], febrile with a temp of 101.1 orally, VSS. Patient's lung sounds are diminished to the right upper lobe and clear to the other lobes b/l. Patient's abdomen is soft, nontender and nondistended. Patient's peripheral pulses are strong and equal b/l with no edema present. Patient's skin is warm, dry, intact and normal for race. Patient denies CP, n/v/d, abdominal pain, numbness/tingling/diziness. Patient placed on cardiac monitor and presents with afib. Patient seen and assessed by MD Patton, plan of care discussed and initiated. Patient resting comfortably in stretcher with family at bedside. Patient awaiting to go to Xray.

## 2019-03-01 NOTE — CONSULT NOTE ADULT - SUBJECTIVE AND OBJECTIVE BOX
HPI:  88 y/o Female  with PMH of CVA , HTN, Afib (on coumadin), psychotic depression, wheelchair bound following fall last year, frequent UTI, baseline mental status oriented x3 per family presents with cough, fever x2 days then became confused then more lethargic today   Started on doxycycline per her PMD yesterday  . Found to have +hMPV. (01 Mar 2019 17:23)      Allergies and Intolerances:        Allergies:  	penicillin: Drug, Faint  	Levaquin: Drug, Faint  	sulfa drugs: Drug Category, Unknown       Intolerances:  	Lorabid: Drug, Faint, dizzy and LOC    Home Medications:   * Patient Currently Takes Medications as of 01-Mar-2019 14:54 documented in Structured Notes  · 	doxycycline hyclate 100 mg oral capsule: 1 cap(s) orally 2 times a day for 10 days  	Note: x2 doses taken only, Last Dose Taken:    · 	Tylenol 325 mg oral tablet: 2 tab(s) orally every 6 hours, As Needed, Last Dose Taken:    · 	warfarin 2 mg oral tablet: 1 tab(s) orally once a day three times a week: Tuesday, Thursday, Saturday , Last Dose Taken:    · 	warfarin 2 mg oral tablet: 0.5 tab(s) orally 4 times a week: , Monday, Wednesday, Friday, Last Dose Taken:    · 	ZyPREXA 10 mg oral tablet: 1 tab(s) orally once a day (at bedtime), Last Dose Taken:    · 	ascorbic acid 500 mg oral tablet: 1 tab(s) orally once a day, Last Dose Taken:    · 	sertraline 100 mg oral tablet: 1 tab(s) orally once a day (in the morning), Last Dose Taken:    · 	irbesartan 150 mg oral tablet: 1 tab(s) orally once a day (in the morning), Last Dose Taken:    · 	Vitamin D3 1000 intl units oral tablet: 1 tab(s) orally once a day, Last Dose Taken:      Patient History:    Past Medical History:  Atrial fibrillation    CVA (cerebral infarction)    Gastritis    Hypertension    IBS (irritable bowel syndrome)    Smoking.     Past Surgical History:  No significant past surgical history.  OBJECTIVE:  ICU Vital Signs Last 24 Hrs  T(C): 36.4 (01 Mar 2019 20:16), Max: 38.4 (01 Mar 2019 12:05)  T(F): 97.5 (01 Mar 2019 20:16), Max: 101.2 (01 Mar 2019 12:05)  HR: 79 (01 Mar 2019 20:16) (75 - 101)  BP: 159/73 (01 Mar 2019 20:16) (116/63 - 165/99)  BP(mean): --  ABP: --  ABP(mean): --  RR: 19 (01 Mar 2019 20:16) (16 - 20)  SpO2: 98% (01 Mar 2019 20:16) (89% - 99%)        CAPILLARY BLOOD GLUCOSE      POCT Blood Glucose.: 105 mg/dL (01 Mar 2019 17:36)      T(C): 36.4 (19 @ 20:16), Max: 38.4 (19 @ 12:05)  HR: 79 (19 @ 20:16) (75 - 101)  BP: 159/73 (19 @ 20:16) (116/63 - 165/99)  RR: 19 (19 @ 20:16) (16 - 20)  SpO2: 98% (19 @ 20:16) (89% - 99%)    CONSTITUTIONAL: ill appearing,  HEAD: Head atraumatic, normal cephalic shape.  EYES: sclera clear bilaterally  CARDIAC: irreg irreg, no murmurs  RESPIRATORY: on NC, radiation of upper airway secretions, coarse breath sounds  CHEST: no erythema, warmth, tenderness or crepitus  GASTROINTESTINAL: soft, nontender, bowel sounds present  MUSCULOSKELETAL: normal strength and ROM, no muscle or joint tenderness  NEUROLOGICAL: AAOx2  SKIN: normal skin color, no apparent rashes    HOSPITAL MEDICATIONS:  MEDICATIONS  (STANDING):  azithromycin  IVPB      cefTRIAXone   IVPB      dextrose 5% + sodium chloride 0.9%. 1000 milliLiter(s) (70 mL/Hr) IV Continuous <Continuous>  losartan 50 milliGRAM(s) Oral daily    MEDICATIONS  (PRN):      LABS:                        11.5   4.9   )-----------( 112      ( 01 Mar 2019 18:07 )             33.5     03    141  |  104  |  26<H>  ----------------------------<  100<H>  5.0   |  24  |  1.16    Ca    9.1      01 Mar 2019 18:07  Phos  3.7       Mg     1.9         TPro  7.4  /  Alb  3.6  /  TBili  0.5  /  DBili  x   /  AST  50<H>  /  ALT  14  /  AlkPhos  78      PT/INR - ( 01 Mar 2019 18:07 )   PT: 22.8 sec;   INR: 1.96 ratio         PTT - ( 01 Mar 2019 18:07 )  PTT:33.2 sec  Urinalysis Basic - ( 01 Mar 2019 13:03 )    Color: Yellow / Appearance: Slightly Turbid / S.026 / pH: x  Gluc: x / Ketone: Negative  / Bili: Negative / Urobili: Negative   Blood: x / Protein: 100 / Nitrite: Negative   Leuk Esterase: Negative / RBC: 4 /hpf / WBC 3 /HPF   Sq Epi: x / Non Sq Epi: 9 /hpf / Bacteria: Negative      Arterial Blood Gas:   @ 17:52  7.41/45/54/28/87/3.3  ABG lactate: --    Venous Blood Gas:   @ 12:33  7.42/42/50/27/86  VBG Lactate: 1.1 HPI:  88 y/o Female  with PMH of CVA , HTN, Afib (on coumadin), psychotic depression, wheelchair bound following fall last year, frequent UTI, baseline mental status oriented x3 per family presents with cough, fever x2 days then became confused then more lethargic today   Started on doxycycline per her PMD yesterday  . Found to have +hMPV. (01 Mar 2019 17:23)      PAST MEDICAL & SURGICAL HISTORY:  IBS (irritable bowel syndrome)  Gastritis  Atrial fibrillation  CVA (cerebral infarction)  Smoking  Hypertension  No significant past surgical history    Allergies  Levaquin (Faint)  penicillin (Faint)  sulfa drugs (Unknown)      OTHER MEDS: MEDICATIONS  (STANDING):  losartan 50 daily  OLANZapine 10 at bedtime  sertraline 100 daily  warfarin 2 once      SOCIAL HISTORY:  [ ] etoh [ ] tobacco [ x] former smoker [ ] IVDU  lives at home  wheelchair bound, can eat independently, can recognize family memebrs and have conversations  daughter is HCP  has DNR    FAMILY HISTORY:  brother lymphoma    HOSPITAL MEDICATIONS:  MEDICATIONS  (STANDING):  azithromycin  IVPB      cefTRIAXone   IVPB      dextrose 5% + sodium chloride 0.9%. 1000 milliLiter(s) (70 mL/Hr) IV Continuous <Continuous>  losartan 50 milliGRAM(s) Oral daily    MEDICATIONS  (PRN):      OBJECTIVE:  ICU Vital Signs Last 24 Hrs  T(C): 36.4 (01 Mar 2019 20:16), Max: 38.4 (01 Mar 2019 12:05)  T(F): 97.5 (01 Mar 2019 20:16), Max: 101.2 (01 Mar 2019 12:05)  HR: 79 (01 Mar 2019 20:16) (75 - 101)  BP: 159/73 (01 Mar 2019 20:16) (116/63 - 165/99)  RR: 19 (01 Mar 2019 20:16) (16 - 20)  SpO2: 98% (01 Mar 2019 20:16) (89% - 99%)    CAPILLARY BLOOD GLUCOSE  POCT Blood Glucose.: 105 mg/dL (01 Mar 2019 17:36)    CONSTITUTIONAL: ill appearing,  HEAD: Head atraumatic, normal cephalic shape.  EYES: sclera clear bilaterally  CARDIAC: irreg irreg, no murmurs  RESPIRATORY: on NC, radiation of upper airway secretions, coarse breath sounds  CHEST: no erythema, warmth, tenderness or crepitus  GASTROINTESTINAL: soft, nontender, bowel sounds present  MUSCULOSKELETAL: normal strength and ROM, no muscle or joint tenderness  NEUROLOGICAL: AAOx2  SKIN: normal skin color, no apparent rashes          LABS:                        11.5   4.9   )-----------( 112      ( 01 Mar 2019 18:07 )             33.5         141  |  104  |  26<H>  ----------------------------<  100<H>  5.0   |  24  |  1.16    Ca    9.1      01 Mar 2019 18:07  Phos  3.7       Mg     1.9         TPro  7.4  /  Alb  3.6  /  TBili  0.5  /  DBili  x   /  AST  50<H>  /  ALT  14  /  AlkPhos  78      PT/INR - ( 01 Mar 2019 18:07 )   PT: 22.8 sec;   INR: 1.96 ratio         PTT - ( 01 Mar 2019 18:07 )  PTT:33.2 sec  Urinalysis Basic - ( 01 Mar 2019 13:03 )    Color: Yellow / Appearance: Slightly Turbid / S.026 / pH: x  Gluc: x / Ketone: Negative  / Bili: Negative / Urobili: Negative   Blood: x / Protein: 100 / Nitrite: Negative   Leuk Esterase: Negative / RBC: 4 /hpf / WBC 3 /HPF   Sq Epi: x / Non Sq Epi: 9 /hpf / Bacteria: Negative      Arterial Blood Gas:   @ 17:52  7.41/45/54/28/87/3.3  ABG lactate: --    Venous Blood Gas:   @ 12:33  7.42/42/50/27/86  VBG Lactate: 1.1    < from: CT Head No Cont (19 @ 18:45) >  IMPRESSION:  No interval change.  Severe severity microvascular ischemic change. No acute intracranial   hemorrhage.    < end of copied text >  < from: CT Chest No Cont (19 @ 16:46) >  Multiple nodular and hazy opacities in the dependent portions of the   right lung lobes, which likely is secondary to infection.    < end of copied text >

## 2019-03-01 NOTE — ED ADULT NURSE REASSESSMENT NOTE - NS ED NURSE REASSESS COMMENT FT1
Patient responsive to pain. Patient on cardiac monitor presenting with afib. Patient's vital signs are stable with a BP of 128/69, a rectal temp taken with a 99.8 temp, unlabored breathing and a respiratory rate noted with patient placed on 3L via NC. Patient's daughters at bedside. MD Base at bedside present for reassessment. MD Base discussed plan of care to patient's family. no RN interventions required. Will continue to monitor and reassess.

## 2019-03-01 NOTE — H&P ADULT - NSHPLABSRESULTS_GEN_ALL_CORE
12.2   6.0   )-----------( 132      ( 01 Mar 2019 12:32 )             35.4       -    142  |  104  |  24<H>  ----------------------------<  120<H>  3.9   |  22  |  1.14    Ca    9.4      01 Mar 2019 12:32    TPro  7.5  /  Alb  3.8  /  TBili  0.5  /  DBili  x   /  AST  37  /  ALT  13  /  AlkPhos  81  03-              Urinalysis Basic - ( 01 Mar 2019 13:03 )    Color: Yellow / Appearance: Slightly Turbid / S.026 / pH: x  Gluc: x / Ketone: Negative  / Bili: Negative / Urobili: Negative   Blood: x / Protein: 100 / Nitrite: Negative   Leuk Esterase: Negative / RBC: 4 /hpf / WBC 3 /HPF   Sq Epi: x / Non Sq Epi: 9 /hpf / Bacteria: Negative        PT/INR - ( 01 Mar 2019 12:32 )   PT: 23.1 sec;   INR: 1.98 ratio         PTT - ( 01 Mar 2019 12:32 )  PTT:32.5 sec    Lactate Trend            CAPILLARY BLOOD GLUCOSE      POCT Blood Glucose.: 106 mg/dL (01 Mar 2019 17:03)

## 2019-03-01 NOTE — ED ADULT NURSE REASSESSMENT NOTE - NS ED NURSE REASSESS COMMENT FT1
Report received from Alvarado RN . Pt AAOx3, NAD, resp nonlabored, skin warm/dry, resting comfortably in bed with family at bedside. Pt has no complaints at this time .  Contact isolation maintained for HMV.  VSS NAD. Pt denies headache, dizziness, chest pain, palpitations, SOB, abd pain, n/v/d, urinary symptoms, fevers, chills, weakness at this time. Currently admitted awaiting inpatient bed placement. Safety maintained.

## 2019-03-01 NOTE — CHART NOTE - NSCHARTNOTEFT_GEN_A_CORE
LTAASHA RICH  87y Female  Patient is a 87y old  Female who presents with a chief complaint of AMS (01 Mar 2019 17:27)    Event Summary: at 1720  Notified by RN, pt with AMS. Pt seen & evaluated, previously unknown to me, unresponsive to deep tactile stimuli, FS & vitals WNL. As per Dr. Garay, a decompensation from when he examined patient earlier. CTH & ABG ordered STAT---RRT called for AMS. Please refer to RRT note for further details.      Nathaniel Saintus [facility]  #[phone]

## 2019-03-01 NOTE — CONSULT NOTE ADULT - ASSESSMENT
87 F PMH A fib, CVA, HTN, gastritis, bipolar, IBS, ex smoker, recurrent UTI's brought in by family due to worsening mental status, with dry cough for 1 week.  In ED temp 101.2 F/101/154/86/16/89% . Patient obtunded on exam with some wheezing, no neck rigidity  Labs with WBC 6 with left shift, , BUN 24, lactate 1.1, CXR (-)  UA (-),  RVP + hMPV    #Human metapneumovirus infection, doubt bacterial pneumonia, altered mental status   Recommend CT head, if wnl would need LP-please send for cytology  PCR, CSF cultures, glucose and protein, LDH  After LP would start meropenem and vancomycin  f/u CT chest results  Check procalcitonin, urine legionella, Blood CX x 2 87 F PMH A fib, CVA, HTN, gastritis, bipolar, IBS, ex smoker, recurrent UTI's brought in by family due to worsening mental status, with dry cough for 1 week.  In ED temp 101.2 F/101/154/86/16/89% . Patient obtunded on exam with some wheezing, no neck rigidity  Labs with WBC 6 with left shift, , BUN 24, lactate 1.1, CXR (-)  UA (-),  RVP + hMPV. CT chest multiple nodular and hazy opacities in dependent portions    #Human metapneumovirus infection, doubt bacterial pneumonia, altered mental status   -Recommend CT head, if wnl would need LP-please send for cytology  PCR, CSF cultures, glucose and protein, LDH  -After LP would start meropenem and vancomycin  -Check procalcitonin, urine legionella, Blood CX x 2   -Discussed with [id number] 87 F with A fib, CVA, HTN, gastritis, bipolar, IBS, ex smoker, recurrent UTI's brought in by family due to worsening mental status, with dry cough for 1 week.  In ED temp 101.2 F/101/154/86/16/89% . Patient obtunded on exam with some wheezing, no neck rigidity  Labs with WBC 6 with left shift, , BUN 24, lactate 1.1, CXR (-)  UA (-),  RVP + hMPV. CT chest multiple nodular and hazy opacities in dependent portions    # fever and cough due to Human metapneumovirus URI and pneumonia  with no WBC doubt that this is bacterial, no h/o aspiration as per family  AMS unresponsive and obtunded (not lethargic just unresponsive)  r/o CVA or encephalitis      -Recommend CT head, if wnl and no improvement in mental status,  would need LP-please send for cytology  PCR, CSF cultures, glucose and protein, LDH  - pt was started on ceftriaxone and azithro by the primary team  -Check procalcitonin, urine legionella, Blood CX x 2   -Discussed with [id number]

## 2019-03-01 NOTE — CHART NOTE - NSCHARTNOTEFT_GEN_A_CORE
LATASHA RICH  87y Female  Patient is a 87y old  Female who presents with a chief complaint of AMS (01 Mar 2019 17:27)    Event Summary:  Patient placed on BIPAP during RRT, with mild improvement of AMS. Evaluated patient with Pulm-Dr. Kearns at bedside. Based on ABG and clinical status, determined BIPAP is not necessary, and was removed by Pulm, placed back on 3L NC with spo2 96-98%. Pulm discussed antibiotic options with family-given extensive allergies to antibx, and plan to c/w IV Azithromycin & Ceftriaxone (has had cephalosporins in the past-tolerated well).  -Spoke with ID, previously consulted by attending, aware pt is pending [facility]. ID recommends possible considerable for meningitis given patient's clinical status-Dr. Garay aware of recommendation.  -Upon return from [facility], remains with AMS, official MICU consult requested @1915. Night NP to follow-up recommendations.      Nathaniel Saintus [facility]  #[phone]

## 2019-03-01 NOTE — CHART NOTE - NSCHARTNOTEFT_GEN_A_CORE
RRT called for AMS  This is a 87yr old Female with PMHx of CVA , HTN, Afib (on coumadin), psychotic depression, wheelchair bound, frequent UTI, presents with cough, fever x2 days then became confused then more lethargic, found to have PNA, RRT called for worsening MS. On arrival to RRT, pt with no eye opening to voice but withdraws to noxious stimuli, pupils equal and responsive. Placed pt on BiPAP, send all labs including Abg, daughters at bedside updated pt's status. Pt more responsive post BiPAP placement. Will monitor for now.  Plans:  1. F/u with ABG & all labs  2. Optimize electrolytes  3. Fall precautions  4. Pt awaiting CT head  5. Monitor respirator status RRT called for AMS  This is a 87yr old Female with PMHx of CVA , HTN, Afib (on coumadin), psychotic depression, wheelchair bound, frequent UTI, presents with cough, fever x2 days then became confused then more lethargic, found to have aspiration PNA, RRT called for worsening MS. On arrival to RRT, pt with no eye opening to voice but withdraws to noxious stimuli, pupils equal and responsive. Placed pt on BiPAP, send all labs including Abg, daughters at bedside updated pt's status. Pt more responsive post BiPAP placement. Will monitor for now.  Plans:  1. F/u with ABG & all labs  2. Optimize electrolytes  3. Fall precautions  4. Pt awaiting CT head  5. Monitor respirator status

## 2019-03-02 LAB
CULTURE RESULTS: SIGNIFICANT CHANGE UP
SPECIMEN SOURCE: SIGNIFICANT CHANGE UP
TSH SERPL-MCNC: 2.03 UIU/ML — SIGNIFICANT CHANGE UP (ref 0.27–4.2)

## 2019-03-02 PROCEDURE — 99232 SBSQ HOSP IP/OBS MODERATE 35: CPT

## 2019-03-02 RX ORDER — WARFARIN SODIUM 2.5 MG/1
2 TABLET ORAL ONCE
Qty: 0 | Refills: 0 | Status: COMPLETED | OUTPATIENT
Start: 2019-03-02 | End: 2019-03-02

## 2019-03-02 RX ORDER — SERTRALINE 25 MG/1
100 TABLET, FILM COATED ORAL DAILY
Qty: 0 | Refills: 0 | Status: DISCONTINUED | OUTPATIENT
Start: 2019-03-02 | End: 2019-03-09

## 2019-03-02 RX ADMIN — OLANZAPINE 10 MILLIGRAM(S): 15 TABLET, FILM COATED ORAL at 00:17

## 2019-03-02 RX ADMIN — Medication 3 MILLILITER(S): at 23:05

## 2019-03-02 RX ADMIN — OLANZAPINE 10 MILLIGRAM(S): 15 TABLET, FILM COATED ORAL at 21:31

## 2019-03-02 RX ADMIN — LOSARTAN POTASSIUM 50 MILLIGRAM(S): 100 TABLET, FILM COATED ORAL at 05:40

## 2019-03-02 RX ADMIN — Medication 3 MILLILITER(S): at 17:54

## 2019-03-02 RX ADMIN — Medication 3 MILLILITER(S): at 05:40

## 2019-03-02 RX ADMIN — CEFTRIAXONE 100 GRAM(S): 500 INJECTION, POWDER, FOR SOLUTION INTRAMUSCULAR; INTRAVENOUS at 17:54

## 2019-03-02 RX ADMIN — WARFARIN SODIUM 2 MILLIGRAM(S): 2.5 TABLET ORAL at 21:31

## 2019-03-02 RX ADMIN — Medication 3 MILLILITER(S): at 00:17

## 2019-03-02 RX ADMIN — Medication 3 MILLILITER(S): at 12:47

## 2019-03-02 NOTE — CONSULT NOTE ADULT - ASSESSMENT
This is a 87y Female, here with hx of psychosis, depression past 6 years since her husbands death, wheelchair bound after fall, here with increased confusion, likely underlying dementia, in setting of HMPV and possible PNA.  Exam has improved in hospital.  Near baseline.  CT head with severe chronic white matter ischemic changes.  Not clear to me if parkinsonism is from concordant vascular dementia vs tardive effects of psych regimen    Plan:  - discussed with daughter  - would continue same zoloft/zyprexa  - no need for MRI/EEG  - abx per ID  - PT  - limit sedating meds  - no further inpt neuro w/u needed

## 2019-03-02 NOTE — CONSULT NOTE ADULT - SUBJECTIVE AND OBJECTIVE BOX
Admitting Diagnosis:  Pneumonia (J18.9): PNEUMONIA, UNSPECIFIED ORGANISM      HPI:  This is a 87y year old Female with hx of CVA , HTN, Afib (on coumadin), psychotic depression, wheelchair bound following fall last year, frequent UTI, baseline mental status oriented x3 per family presents with cough, fever x2 days then became confused then more lethargic today.  Pt found to have +hMPV being treated with additional abx for possible pna.  recently wtih cough syrup which was proposed could have interacted with her psych meds.  Pt was well until her husbands death about 6 years ago.  Has been better on current zoloft and zyprexa regimen       Past Medical History:  IBS (irritable bowel syndrome) (564.1)  Gastritis (535.50)  Atrial fibrillation (427.31)  CVA (cerebral infarction) (434.91)  Celiac disease (579.0)  Smoking (305.1)  Hypertension (401.9)      Past Surgical History:  No significant past surgical history ([id number])      Social History:  No toxic habits    Family History:  FAMILY HISTORY:      Allergies:  Levaquin (Faint)  Lorabid (Faint)  penicillin (Faint)  sulfa drugs (Unknown)      ROS:  Constitutional: Patient offers no complaints of fevers or significant weight loss  Ears, Nose, Mouth and Throat: The patient presents with no abnormalities of the head, ears, eyes, nose or throat  Skin: Patient offers no concerns of new rashes or lesions  Respiratory: The patient presents with no abnormalities of the respiratory tract  Cardiovascular: The patient presents with no cardiac abnormalities  Gastrointestinal: The patient presents with no abnormalities of the GI system  Genitourinary: The patient presents with no dysuria, hematuria or frequent urination  Neurological: See HPI  Endocrine: Patient offers no complaints of excessive thirst, urination, or heat/cold intolerance    Advanced care planning reviewed and noted in the chart.    Medications:  ALBUTerol/ipratropium for Nebulization 3 milliLiter(s) Nebulizer every 6 hours  cefTRIAXone   IVPB      cefTRIAXone   IVPB 1 Gram(s) IV Intermittent every 24 hours  dextrose 5% + sodium chloride 0.9%. 1000 milliLiter(s) IV Continuous <Continuous>  losartan 50 milliGRAM(s) Oral daily  OLANZapine 10 milliGRAM(s) Oral at bedtime  sertraline 100 milliGRAM(s) Oral daily      Labs:  CBC Full  -  ( 01 Mar 2019 18:07 )  WBC Count : 4.9 K/uL  Hemoglobin : 11.5 g/dL  Hematocrit : 33.5 %  Platelet Count - Automated : 112 K/uL  Mean Cell Volume : 89.2 fl  Mean Cell Hemoglobin : 30.7 pg  Mean Cell Hemoglobin Concentration : 34.4 gm/dL  Auto Neutrophil # : 3.9 K/uL  Auto Lymphocyte # : 0.7 K/uL  Auto Monocyte # : 0.3 K/uL  Auto Eosinophil # : 0.0 K/uL  Auto Basophil # : 0.0 K/uL  Auto Neutrophil % : 79.2 %  Auto Lymphocyte % : 13.4 %  Auto Monocyte % : 6.6 %  Auto Eosinophil % : 0.7 %  Auto Basophil % : 0.1 %        141  |  104  |  26<H>  ----------------------------<  100<H>  5.0   |  24  |  1.16    Ca    9.1      01 Mar 2019 18:07  Phos  3.7     -  Mg     1.9         TPro  7.4  /  Alb  3.6  /  TBili  0.5  /  DBili  x   /  AST  50<H>  /  ALT  14  /  AlkPhos  78      CAPILLARY BLOOD GLUCOSE      POCT Blood Glucose.: 105 mg/dL (01 Mar 2019 17:36)  POCT Blood Glucose.: 106 mg/dL (01 Mar 2019 17:03)    LIVER FUNCTIONS - ( 01 Mar 2019 18:07 )  Alb: 3.6 g/dL / Pro: 7.4 g/dL / ALK PHOS: 78 U/L / ALT: 14 U/L / AST: 50 U/L / GGT: x           PT/INR - ( 01 Mar 2019 18:07 )   PT: 22.8 sec;   INR: 1.96 ratio         PTT - ( 01 Mar 2019 18:07 )  PTT:33.2 sec  Urinalysis Basic - ( 01 Mar 2019 13:03 )    Color: Yellow / Appearance: Slightly Turbid / S.026 / pH: x  Gluc: x / Ketone: Negative  / Bili: Negative / Urobili: Negative   Blood: x / Protein: 100 / Nitrite: Negative   Leuk Esterase: Negative / RBC: 4 /hpf / WBC 3 /HPF   Sq Epi: x / Non Sq Epi: 9 /hpf / Bacteria: Negative        Vitals:  Vital Signs Last 24 Hrs  T(C): 37.1 (02 Mar 2019 07:45), Max: 37.7 (01 Mar 2019 16:03)  T(F): 98.7 (02 Mar 2019 07:45), Max: 99.8 (01 Mar 2019 16:03)  HR: 98 (02 Mar 2019 07:45) (75 - 98)  BP: 137/92 (02 Mar 2019 07:45) (116/63 - 159/78)  BP(mean): --  RR: 18 (02 Mar 2019 07:45) (16 - 20)  SpO2: 98% (02 Mar 2019 07:45) (95% - 100%)    NEUROLOGICAL EXAM:    Mental status: Awake, alert, and in no apparent distress. Oriented to person, said she lives at home with her boyfriend. Pleaseantly confused.    decreased facies    Cranial Nerves: Pupils were equal, round, reactive to light. Extraocular movements were intact. Visual field were full. Fundoscopic exam was deferred. Facial sensation was intact to light touch. There was no facial asymmetry. The palate was upgoing symmetrically and tongue was midline. Hearing acuity was intact to finger rub AU. Shoulder shrug was full bilaterally.  some chin tremor noted    Motor exam: mild cogwheeling. Strength was 5/5 in all four extremities. Fine finger movements were symmetric and normal. There was no pronator drift    Reflexes: 2+ in the bilateral upper extremities. 2+ in the bilateral lower extremities. Toes were downgoing bilaterally.     Sensation: Intact to light touch, temperature, vibration and proprioception.     Coordination: Finger-nose-finger and heel-to-shin was without dysmetria.     Gait: deferred     Imaging:      EXAM:  CT BRAIN                            PROCEDURE DATE:  2019            INTERPRETATION:  HISTORY: change in [id number]: J18.9 PNEUMONIA,   UNSPECIFIED ORGANISM/Minimally respnsive at this time    Technique: Noncontrast CT of the head wasperformed.    Multiple contiguous axial images were acquired from the skull base to the   vertex without the administration of intravenous contrast. Coronal and   sagittal reformations were made.    COMPARISON: CT head dated 2018, 8/15/2017, 2013. MR brain dated   2018..    FINDINGS:  There has been no interval change.    Mild prominence of the sulci and ventricles are consistent with moderate   volume loss. There are hemispheric white matter areas of low attenuation   which are nonspecific but likely related to sequelae of severe severity   microvascular disease. There is no intraparenchymal hematoma, mass effect   or midline shift. No abnormal extra-axial fluid collections are present.   There is no evidence of acute transcortical territorial infarction.    The calvarium is intact. The visualized intraorbital compartments,   paranasal sinuses and tympanomastoid cavities appear free of acute   disease.    IMPRESSION:  No interval change.  Severe severity microvascular ischemic change. No acute intracranial   hemorrhage.                    DANIEL GRADY M.D., ATTENDING RADIOLOGIST  This document has been electronically signed. Mar  1 2019  7:06PM

## 2019-03-03 LAB
ANION GAP SERPL CALC-SCNC: 13 MMOL/L — SIGNIFICANT CHANGE UP (ref 5–17)
BUN SERPL-MCNC: 21 MG/DL — SIGNIFICANT CHANGE UP (ref 7–23)
CALCIUM SERPL-MCNC: 8.8 MG/DL — SIGNIFICANT CHANGE UP (ref 8.4–10.5)
CHLORIDE SERPL-SCNC: 108 MMOL/L — SIGNIFICANT CHANGE UP (ref 96–108)
CO2 SERPL-SCNC: 23 MMOL/L — SIGNIFICANT CHANGE UP (ref 22–31)
CREAT SERPL-MCNC: 1.01 MG/DL — SIGNIFICANT CHANGE UP (ref 0.5–1.3)
GLUCOSE SERPL-MCNC: 89 MG/DL — SIGNIFICANT CHANGE UP (ref 70–99)
HCT VFR BLD CALC: 31.6 % — LOW (ref 34.5–45)
HGB BLD-MCNC: 9.8 G/DL — LOW (ref 11.5–15.5)
INR BLD: 2.24 RATIO — HIGH (ref 0.88–1.16)
MCHC RBC-ENTMCNC: 28.6 PG — SIGNIFICANT CHANGE UP (ref 27–34)
MCHC RBC-ENTMCNC: 31 GM/DL — LOW (ref 32–36)
MCV RBC AUTO: 92.1 FL — SIGNIFICANT CHANGE UP (ref 80–100)
PLATELET # BLD AUTO: 133 K/UL — LOW (ref 150–400)
POTASSIUM SERPL-MCNC: 3.4 MMOL/L — LOW (ref 3.5–5.3)
POTASSIUM SERPL-SCNC: 3.4 MMOL/L — LOW (ref 3.5–5.3)
PROTHROM AB SERPL-ACNC: 26.4 SEC — HIGH (ref 10–13.1)
RBC # BLD: 3.43 M/UL — LOW (ref 3.8–5.2)
RBC # FLD: 14.7 % — HIGH (ref 10.3–14.5)
SODIUM SERPL-SCNC: 144 MMOL/L — SIGNIFICANT CHANGE UP (ref 135–145)
WBC # BLD: 3.8 K/UL — SIGNIFICANT CHANGE UP (ref 3.8–10.5)
WBC # FLD AUTO: 3.8 K/UL — SIGNIFICANT CHANGE UP (ref 3.8–10.5)

## 2019-03-03 RX ORDER — SODIUM CHLORIDE 9 MG/ML
1000 INJECTION, SOLUTION INTRAVENOUS
Qty: 0 | Refills: 0 | Status: DISCONTINUED | OUTPATIENT
Start: 2019-03-03 | End: 2019-03-07

## 2019-03-03 RX ORDER — POTASSIUM CHLORIDE 20 MEQ
10 PACKET (EA) ORAL ONCE
Qty: 0 | Refills: 0 | Status: DISCONTINUED | OUTPATIENT
Start: 2019-03-03 | End: 2019-03-03

## 2019-03-03 RX ORDER — WARFARIN SODIUM 2.5 MG/1
2 TABLET ORAL ONCE
Qty: 0 | Refills: 0 | Status: COMPLETED | OUTPATIENT
Start: 2019-03-03 | End: 2019-03-03

## 2019-03-03 RX ORDER — POTASSIUM CHLORIDE 20 MEQ
20 PACKET (EA) ORAL ONCE
Qty: 0 | Refills: 0 | Status: COMPLETED | OUTPATIENT
Start: 2019-03-03 | End: 2019-03-03

## 2019-03-03 RX ORDER — POTASSIUM CHLORIDE 20 MEQ
20 PACKET (EA) ORAL ONCE
Qty: 0 | Refills: 0 | Status: DISCONTINUED | OUTPATIENT
Start: 2019-03-03 | End: 2019-03-03

## 2019-03-03 RX ADMIN — SERTRALINE 100 MILLIGRAM(S): 25 TABLET, FILM COATED ORAL at 11:22

## 2019-03-03 RX ADMIN — Medication 20 MILLIEQUIVALENT(S): at 18:13

## 2019-03-03 RX ADMIN — Medication 20 MILLIGRAM(S): at 14:24

## 2019-03-03 RX ADMIN — WARFARIN SODIUM 2 MILLIGRAM(S): 2.5 TABLET ORAL at 22:04

## 2019-03-03 RX ADMIN — CEFTRIAXONE 100 GRAM(S): 500 INJECTION, POWDER, FOR SOLUTION INTRAMUSCULAR; INTRAVENOUS at 11:32

## 2019-03-03 RX ADMIN — Medication 20 MILLIGRAM(S): at 22:04

## 2019-03-03 RX ADMIN — Medication 3 MILLILITER(S): at 05:12

## 2019-03-03 RX ADMIN — Medication 3 MILLILITER(S): at 19:23

## 2019-03-03 RX ADMIN — OLANZAPINE 10 MILLIGRAM(S): 15 TABLET, FILM COATED ORAL at 22:04

## 2019-03-03 RX ADMIN — Medication 3 MILLILITER(S): at 23:52

## 2019-03-03 RX ADMIN — SODIUM CHLORIDE 50 MILLILITER(S): 9 INJECTION, SOLUTION INTRAVENOUS at 11:32

## 2019-03-03 RX ADMIN — LOSARTAN POTASSIUM 50 MILLIGRAM(S): 100 TABLET, FILM COATED ORAL at 05:12

## 2019-03-03 RX ADMIN — Medication 3 MILLILITER(S): at 11:42

## 2019-03-03 NOTE — PROVIDER CONTACT NOTE (OTHER) - SITUATION
Pt lethargic.  pt arousable to voice.  pt refusing po , sips of water.  Unable to administer po medication , zoloft per family request.

## 2019-03-03 NOTE — PHYSICAL THERAPY INITIAL EVALUATION ADULT - ADDITIONAL COMMENTS
History provided by pt's daughter. pt lives in private home, 4 steps to enter +HR, first floor set up. Pt has 24/7 HHA to assist with all functional mobility and ADLs. Pt transfers to/from wheelchair with assist (able to take a few steps), pt does not walk much around her home, mostly uses wheelchair. Dtr states pt is able to negotiate stairs with assist.

## 2019-03-03 NOTE — PHYSICAL THERAPY INITIAL EVALUATION ADULT - GAIT DEVIATIONS NOTED, PT EVAL
decreased aaron/decreased velocity of limb motion/decreased stride length/decreased weight-shifting ability

## 2019-03-03 NOTE — PHYSICAL THERAPY INITIAL EVALUATION ADULT - DISCHARGE DISPOSITION, PT EVAL
assist required for all functional mobility. Assist required for transportation and entry into home/home w/ home PT

## 2019-03-03 NOTE — PHYSICAL THERAPY INITIAL EVALUATION ADULT - PLANNED THERAPY INTERVENTIONS, PT EVAL
balance training/bed mobility training/GOAL: Pt will negotiate 4 steps with 1 HR and step to pattern Mod A in 4 weeks.

## 2019-03-03 NOTE — PHYSICAL THERAPY INITIAL EVALUATION ADULT - PERTINENT HX OF CURRENT PROBLEM, REHAB EVAL
88 yo F p/w cough, fever x2 days, now more confused and lethargic. Found in ER to have +RVP for hMPV. CT Head 3/1: No interval change. Severe severity microvascular ischemic change. No acute intracranial hemorrhage.CT Chest 3/1: Multiple nodular and hazy opacities in the dependent portions of the right lung lobes, which likely is secondary to infection.

## 2019-03-03 NOTE — PROGRESS NOTE ADULT - ATTENDING COMMENTS
no co2 retention on yesterdays ABG: But hypoxic: Maintain hero2 sao2 above 90%.? speech and swallow  3/3: cont antibtiocs: add steroids: monitor reps staus

## 2019-03-03 NOTE — PROVIDER CONTACT NOTE (OTHER) - ACTION/TREATMENT ORDERED:
NP Notified of pt condition, lethargic and unable/unwilling to take po.  NP at bedside to observe pt.  continue to mnt pt status and ensure safety.

## 2019-03-04 DIAGNOSIS — J69.0 PNEUMONITIS DUE TO INHALATION OF FOOD AND VOMIT: ICD-10-CM

## 2019-03-04 LAB
ANION GAP SERPL CALC-SCNC: 16 MMOL/L — SIGNIFICANT CHANGE UP (ref 5–17)
BUN SERPL-MCNC: 19 MG/DL — SIGNIFICANT CHANGE UP (ref 7–23)
CALCIUM SERPL-MCNC: 8.8 MG/DL — SIGNIFICANT CHANGE UP (ref 8.4–10.5)
CHLORIDE SERPL-SCNC: 107 MMOL/L — SIGNIFICANT CHANGE UP (ref 96–108)
CO2 SERPL-SCNC: 22 MMOL/L — SIGNIFICANT CHANGE UP (ref 22–31)
CREAT SERPL-MCNC: 0.77 MG/DL — SIGNIFICANT CHANGE UP (ref 0.5–1.3)
GLUCOSE SERPL-MCNC: 148 MG/DL — HIGH (ref 70–99)
HCT VFR BLD CALC: 32.9 % — LOW (ref 34.5–45)
HGB BLD-MCNC: 10.3 G/DL — LOW (ref 11.5–15.5)
INR BLD: 2.5 RATIO — HIGH (ref 0.88–1.16)
MCHC RBC-ENTMCNC: 28.6 PG — SIGNIFICANT CHANGE UP (ref 27–34)
MCHC RBC-ENTMCNC: 31.3 GM/DL — LOW (ref 32–36)
MCV RBC AUTO: 91.4 FL — SIGNIFICANT CHANGE UP (ref 80–100)
PLATELET # BLD AUTO: 144 K/UL — LOW (ref 150–400)
POTASSIUM SERPL-MCNC: 3.9 MMOL/L — SIGNIFICANT CHANGE UP (ref 3.5–5.3)
POTASSIUM SERPL-SCNC: 3.9 MMOL/L — SIGNIFICANT CHANGE UP (ref 3.5–5.3)
PROTHROM AB SERPL-ACNC: 29.3 SEC — HIGH (ref 10–13.1)
RBC # BLD: 3.6 M/UL — LOW (ref 3.8–5.2)
RBC # FLD: 14.1 % — SIGNIFICANT CHANGE UP (ref 10.3–14.5)
SODIUM SERPL-SCNC: 145 MMOL/L — SIGNIFICANT CHANGE UP (ref 135–145)
WBC # BLD: 3.03 K/UL — LOW (ref 3.8–10.5)
WBC # FLD AUTO: 3.03 K/UL — LOW (ref 3.8–10.5)

## 2019-03-04 PROCEDURE — 99232 SBSQ HOSP IP/OBS MODERATE 35: CPT

## 2019-03-04 RX ORDER — WARFARIN SODIUM 2.5 MG/1
2 TABLET ORAL ONCE
Qty: 0 | Refills: 0 | Status: COMPLETED | OUTPATIENT
Start: 2019-03-04 | End: 2019-03-04

## 2019-03-04 RX ORDER — ALPRAZOLAM 0.25 MG
0.25 TABLET ORAL EVERY 12 HOURS
Qty: 0 | Refills: 0 | Status: DISCONTINUED | OUTPATIENT
Start: 2019-03-04 | End: 2019-03-09

## 2019-03-04 RX ADMIN — Medication 20 MILLIGRAM(S): at 06:01

## 2019-03-04 RX ADMIN — Medication 3 MILLILITER(S): at 06:01

## 2019-03-04 RX ADMIN — Medication 0.25 MILLIGRAM(S): at 18:07

## 2019-03-04 RX ADMIN — LOSARTAN POTASSIUM 50 MILLIGRAM(S): 100 TABLET, FILM COATED ORAL at 06:02

## 2019-03-04 RX ADMIN — Medication 40 MILLIGRAM(S): at 16:16

## 2019-03-04 RX ADMIN — Medication 3 MILLILITER(S): at 16:15

## 2019-03-04 RX ADMIN — Medication 1200 MILLIGRAM(S): at 16:16

## 2019-03-04 RX ADMIN — WARFARIN SODIUM 2 MILLIGRAM(S): 2.5 TABLET ORAL at 20:52

## 2019-03-04 RX ADMIN — CEFTRIAXONE 100 GRAM(S): 500 INJECTION, POWDER, FOR SOLUTION INTRAMUSCULAR; INTRAVENOUS at 16:17

## 2019-03-04 RX ADMIN — OLANZAPINE 10 MILLIGRAM(S): 15 TABLET, FILM COATED ORAL at 20:51

## 2019-03-04 RX ADMIN — SERTRALINE 100 MILLIGRAM(S): 25 TABLET, FILM COATED ORAL at 09:13

## 2019-03-04 NOTE — SWALLOW BEDSIDE ASSESSMENT ADULT - SLP PRECAUTIONS/LIMITATIONS: HEARING
Severely Impaired: absence of useful hearing/impaired
Severely Impaired: absence of useful hearing/impaired

## 2019-03-04 NOTE — SWALLOW BEDSIDE ASSESSMENT ADULT - SWALLOW EVAL: DIAGNOSIS
Baseline cough, wet breath sounds, and wheeze confound clinical assessment. Given concern for aspiration PNA, would recommend a MBS to formally assess.
Attempted to see patient for bedside swallow evaluation. Pt currently asleep, unable to be roused by daughter despite tactile and noxious stimuli. As per RN, pt had been awake earlier in the day and was moved from the chair back to the bed. Per RN pt with good PO intake at lunchtime with no overt s/s of laryngeal penetration or aspiration. This service will reattempt evaluation tomorrow, 3/4. Daughter and RN made aware.

## 2019-03-04 NOTE — SWALLOW BEDSIDE ASSESSMENT ADULT - COMMENTS
88 y/o Female  with PMH of CVA , HTN, Afib (on coumadin), psychotic depression, wheelchair bound following fall last year, frequent UTI, baseline mental status oriented x3 per family presents with cough, fever x2 days then became confused then more lethargic today. Started on doxycycline per her PMD yesterday. Found to have +hMPV. Dx sepsis. Seen by ID, fever and cough due to Human metapneumovirus URI and pneumonia with no WBC doubt that this is bacterial, no h/o aspiration as per family AMS unresponsive and obtunded (not lethargic just unresponsive) r/o CVA or encephalitis Recommend CT head, if wnl and no improvement in mental status, would need LP-please send for cytology  PCR, CSF cultures, glucose and protein, LDH- pt was started on ceftriaxone and azithro by the primary team-Check procalcitonin, urine legionella, Blood CX x 2  S/p RRT on 3/1 for worsening MS, placed on BIPAP. Pulm was at bedside per RRT note, and based on ABG and clinical status, determined BIPAP is not necessary, and was removed by Pulm, placed back on 3L NC with spo2 96-98%. MICU consulted and pt was determined not to be a candidate. 3/2 seen by neuro, CT head with severe chronic white matter ischemic changes. Not clear to me if parkinsonism is from concordant vascular dementia vs tardive effects of psych regimen. Plan: - would continue same zoloft/zyprexa- no need for MRI/EEG. 3/3 Pt lethargic. pt arousable to voice. pt refusing po, sips of water. Unable to administer po medication, zoloft per family request. Imaging: CXR: clear lungs CT Head: Severe severity microvascular ischemic change. No acute intracranial hemorrhage.
86 y/o Female  with PMH of CVA , HTN, Afib (on coumadin), psychotic depression, wheelchair bound following fall last year, frequent UTI, baseline mental status oriented x3 per family presents with cough, fever x2 days then became confused then more lethargic today. Started on doxycycline per her PMD yesterday. Found to have +hMPV. Dx sepsis. Seen by ID, fever and cough due to Human metapneumovirus URI and pneumonia with no WBC doubt that this is bacterial, no h/o aspiration as per family AMS unresponsive and obtunded (not lethargic just unresponsive) r/o CVA or encephalitis Recommend CT head, if wnl and no improvement in mental status, would need LP-please send for cytology  PCR, CSF cultures, glucose and protein, LDH- pt was started on ceftriaxone and azithro by the primary team-Check procalcitonin, urine legionella, Blood CX x 2  S/p RRT on 3/1 for worsening MS, placed on BIPAP. Pulm was at bedside per RRT note, and based on ABG and clinical status, determined BIPAP is not necessary, and was removed by Pulm, placed back on 3L NC with spo2 96-98%. MICU consulted and pt was determined not to be a candidate. 3/2 seen by neuro, CT head with severe chronic white matter ischemic changes. Not clear to me if parkinsonism is from concordant vascular dementia vs tardive effects of psych regimen. Plan: - would continue same zoloft/zyprexa- no need for MRI/EEG. 3/3 Pt lethargic. pt arousable to voice. pt refusing po, sips of water. Unable to administer po medication, zoloft per family request. Imaging: CXR: clear lungs CT Head: Severe severity microvascular ischemic change. No acute intracranial hemorrhage. Per pulm, RLL dependent opacities on CT chest concerning for superimposed aspiration PNA d/t AMS with viral illness.

## 2019-03-04 NOTE — SWALLOW BEDSIDE ASSESSMENT ADULT - SWALLOW EVAL: ORAL MUSCULATURE
unable to assess due to poor participation/comprehension unable to assess due to poor participation/comprehension/pt with attempts to follow commands for assessment, however, attention reduced and pt easily distracted. Improved command following with visual model.

## 2019-03-04 NOTE — SWALLOW BEDSIDE ASSESSMENT ADULT - SWALLOW EVAL: PATIENT/FAMILY GOALS STATEMENT
Daughter reports pt suddenly ill with wheeze and congestions and hoarseness. Daughter reports pt suddenly ill with wheeze and congestions and hoarseness. No reports of dysphagia PTA.

## 2019-03-04 NOTE — SWALLOW BEDSIDE ASSESSMENT ADULT - SLP GENERAL OBSERVATIONS
Pt supine in bed, A&Ox2 (stated month as January and grossly to place as she knew she was in a hospital, unable to name) Pt supine in bed, A&Ox2 (stated month as January and grossly to place as she knew she was in a hospital, unable to name), tremulous lingual movements that daughter reports as new. Pt kyphotic with high shoulder girdle which may impede visualization of necessary structures during MBS.

## 2019-03-05 LAB
BASOPHILS # BLD AUTO: 0.02 K/UL — SIGNIFICANT CHANGE UP (ref 0–0.2)
BASOPHILS NFR BLD AUTO: 0.2 % — SIGNIFICANT CHANGE UP (ref 0–2)
EOSINOPHIL # BLD AUTO: 0 K/UL — SIGNIFICANT CHANGE UP (ref 0–0.5)
EOSINOPHIL NFR BLD AUTO: 0 % — SIGNIFICANT CHANGE UP (ref 0–6)
HCT VFR BLD CALC: 36 % — SIGNIFICANT CHANGE UP (ref 34.5–45)
HGB BLD-MCNC: 11.3 G/DL — LOW (ref 11.5–15.5)
IMM GRANULOCYTES NFR BLD AUTO: 1.2 % — SIGNIFICANT CHANGE UP (ref 0–1.5)
INR BLD: 3.61 RATIO — HIGH (ref 0.88–1.16)
LYMPHOCYTES # BLD AUTO: 0.84 K/UL — LOW (ref 1–3.3)
LYMPHOCYTES # BLD AUTO: 10.1 % — LOW (ref 13–44)
MCHC RBC-ENTMCNC: 28.4 PG — SIGNIFICANT CHANGE UP (ref 27–34)
MCHC RBC-ENTMCNC: 31.4 GM/DL — LOW (ref 32–36)
MCV RBC AUTO: 90.5 FL — SIGNIFICANT CHANGE UP (ref 80–100)
MONOCYTES # BLD AUTO: 0.32 K/UL — SIGNIFICANT CHANGE UP (ref 0–0.9)
MONOCYTES NFR BLD AUTO: 3.8 % — SIGNIFICANT CHANGE UP (ref 2–14)
NEUTROPHILS # BLD AUTO: 7.05 K/UL — SIGNIFICANT CHANGE UP (ref 1.8–7.4)
NEUTROPHILS NFR BLD AUTO: 84.7 % — HIGH (ref 43–77)
PLATELET # BLD AUTO: 194 K/UL — SIGNIFICANT CHANGE UP (ref 150–400)
PROTHROM AB SERPL-ACNC: 43.2 SEC — HIGH (ref 10–13.1)
RBC # BLD: 3.98 M/UL — SIGNIFICANT CHANGE UP (ref 3.8–5.2)
RBC # FLD: 14.2 % — SIGNIFICANT CHANGE UP (ref 10.3–14.5)
WBC # BLD: 8.33 K/UL — SIGNIFICANT CHANGE UP (ref 3.8–10.5)
WBC # FLD AUTO: 8.33 K/UL — SIGNIFICANT CHANGE UP (ref 3.8–10.5)

## 2019-03-05 PROCEDURE — 74230 X-RAY XM SWLNG FUNCJ C+: CPT | Mod: 26

## 2019-03-05 PROCEDURE — 99232 SBSQ HOSP IP/OBS MODERATE 35: CPT

## 2019-03-05 RX ADMIN — Medication 3 MILLILITER(S): at 12:46

## 2019-03-05 RX ADMIN — LOSARTAN POTASSIUM 50 MILLIGRAM(S): 100 TABLET, FILM COATED ORAL at 06:15

## 2019-03-05 RX ADMIN — Medication 0.25 MILLIGRAM(S): at 19:00

## 2019-03-05 RX ADMIN — Medication 1200 MILLIGRAM(S): at 06:15

## 2019-03-05 RX ADMIN — Medication 3 MILLILITER(S): at 06:15

## 2019-03-05 RX ADMIN — Medication 1200 MILLIGRAM(S): at 17:31

## 2019-03-05 RX ADMIN — CEFTRIAXONE 100 GRAM(S): 500 INJECTION, POWDER, FOR SOLUTION INTRAMUSCULAR; INTRAVENOUS at 17:30

## 2019-03-05 RX ADMIN — Medication 40 MILLIGRAM(S): at 06:15

## 2019-03-05 RX ADMIN — Medication 3 MILLILITER(S): at 00:51

## 2019-03-05 RX ADMIN — SODIUM CHLORIDE 50 MILLILITER(S): 9 INJECTION, SOLUTION INTRAVENOUS at 06:16

## 2019-03-05 RX ADMIN — Medication 3 MILLILITER(S): at 23:04

## 2019-03-05 RX ADMIN — OLANZAPINE 10 MILLIGRAM(S): 15 TABLET, FILM COATED ORAL at 21:51

## 2019-03-05 RX ADMIN — SERTRALINE 100 MILLIGRAM(S): 25 TABLET, FILM COATED ORAL at 12:46

## 2019-03-05 RX ADMIN — Medication 3 MILLILITER(S): at 17:31

## 2019-03-05 NOTE — SWALLOW VFSS/MBS ASSESSMENT ADULT - NS SWALLOW VFSS REC ASPIR MON
upper respiratory infection/change of breathing pattern/cough/fever/pneumonia/Monitor for s/s aspiration/laryngeal penetration. If noted:  D/C p.o. intake, provide non-oral nutrition/hydration/meds, and contact this service @ [phone]/carter voice/throat clearing

## 2019-03-05 NOTE — CHART NOTE - NSCHARTNOTEFT_GEN_A_CORE
Patient requires hoyerlift which is medically necessary. Patient has generalized weakness and advanced dementia, requires maximum assistance of 2 persons for transfer, has decreased weight-shifting ability, abnormal muscle tone and decreased balance which places patient at high risk for falls.    SONIA Dunham

## 2019-03-05 NOTE — SWALLOW VFSS/MBS ASSESSMENT ADULT - RECOMMENDED CONSISTENCY
Dysphagia I with nectar thickened liquids. Crush meds or provide via alternate source. MD/team Please enter the following as provider to RN orders : 1) Full assist with meals, 2) Crush meds or provide via alternate source, 3) ALL p.o. via teaspoon. NO CUP. NO STRAW.  4) Provide small single bites and sips at slow rate 5) Encourage successive swallows for oral and pharyngeal clearance 6) Alternate food and liquids to aid in oral and pharyngeal clearance 7) Aspiration precautions. Monitor for s/s aspiration/laryngeal penetration. If noted:  D/C p.o. intake, provide non-oral nutrition/hydration/meds Dysphagia I with nectar thickened liquids VIA TSP ONLY. Crush meds or provide via alternate source.    MD/team Please enter the following as provider to RN orders : 1) Full assist with meals, 2) Crush meds or provide via alternate source, 3) ALL p.o. via teaspoon. NO CUP. NO STRAW.  4) Provide small single bites and sips at slow rate 5) Encourage successive swallows for oral and pharyngeal clearance 6) Alternate food and liquids to aid in oral and pharyngeal clearance 7) Aspiration precautions. Monitor for s/s aspiration/laryngeal penetration. If noted:  D/C p.o. intake, provide non-oral nutrition/hydration/meds

## 2019-03-05 NOTE — SWALLOW VFSS/MBS ASSESSMENT ADULT - ASPIRATION DURING THE SWALLOW - SILENT
Severe/Penetrated material before the swallow aspirated during the swallow and noted along anterior portion of the trachea Severe/of penetrated material upon trigger of swallow; along anterior tracheal wall

## 2019-03-05 NOTE — SWALLOW VFSS/MBS ASSESSMENT ADULT - COMMENTS
88 y/o Female  with PMH of CVA , HTN, Afib (on coumadin), psychotic depression, wheelchair bound following fall last year, frequent UTI, baseline mental status oriented x3 per family presents with cough, fever x2 days then became confused then more lethargic today. Started on doxycycline per her PMD yesterday. Found to have +hMPV. Dx sepsis. Seen by ID, fever and cough due to Human metapneumovirus URI and pneumonia with no WBC doubt that this is bacterial, no h/o aspiration as per family AMS unresponsive and obtunded (not lethargic just unresponsive) r/o CVA or encephalitis Recommend CT head, if wnl and no improvement in mental status, would need LP-please send for cytology  PCR, CSF cultures, glucose and protein, LDH- pt was started on ceftriaxone and azithro by the primary team-Check procalcitonin, urine legionella, Blood CX x 2  S/p RRT on 3/1 for worsening MS, placed on BIPAP. Pulm was at bedside per RRT note, and based on ABG and clinical status, determined BIPAP is not necessary, and was removed by Pulm, placed back on 3L NC with spo2 96-98%. MICU consulted and pt was determined not to be a candidate. 3/2 seen by neuro, CT head with severe chronic white matter ischemic changes. Not clear to me if parkinsonism is from concordant vascular dementia vs tardive effects of psych regimen. Plan: - would continue same zoloft/zyprexa- no need for MRI/EEG. 3/3 Pt lethargic. pt arousable to voice. pt refusing po, sips of water. Unable to administer po medication, zoloft per family request. Imaging: CXR: clear lungs CT Head: Severe severity microvascular ischemic change. No acute intracranial hemorrhage. Per pulm, RLL dependent opacities on CT chest concerning for superimposed aspiration PNA d/t AMS with viral illness.

## 2019-03-05 NOTE — SWALLOW VFSS/MBS ASSESSMENT ADULT - LARYNGEAL PENETRATION DURING THE SWALLOW - SILENT
over the posterior laryngeal surface of the arytenoids; appears to spontaneously clear during the swallow/Trace alone the posterior laryngeal surface of the arytenoids; appears to spontaneously clear during the swallow/Trace Mild/over the arytenoids; does not appear to completely clear spontaneously; penetration reduced with tsp sips and material appeared to clear spontaneously Trace/over the arytenoids; appears to spontaneously clear during the swallow Trace/along the posterior laryngeal surface of the arytenoids; appears to spontaneously clear during the swallow

## 2019-03-05 NOTE — SWALLOW VFSS/MBS ASSESSMENT ADULT - ORAL PREP COMMENTS
Reduced bolus control with diffuse spread of bolus in oral cavity. Reduced bolus control/manipulation.  Majority of oral prep phase conducted off fluoro to reduce pt's exposure.

## 2019-03-05 NOTE — SWALLOW VFSS/MBS ASSESSMENT ADULT - INADEQUATE HYOLARYNGEAL ELEVATION
Severe/Moderately reduced anterior hyoid excursion. Moderately reduced anterior hyoid movement/Severe Severe/Moderately reduced anterior hyoid movement +

## 2019-03-05 NOTE — SWALLOW VFSS/MBS ASSESSMENT ADULT - SLP GENERAL OBSERVATIONS
Patient received awake and alert, upright and secure in TRA chair, +2L/NC, A&Ox2 (grossly to place, "hospital").

## 2019-03-05 NOTE — SWALLOW VFSS/MBS ASSESSMENT ADULT - DIAGNOSTIC IMPRESSIONS
Patient presents with oropharyngoesophageal dysphagia characterized by impaired oral management of solids>semisolids, bolus spillover to the zaina/hypopharynx prior to airway closure, delayed trigger of the swallow, laryngeal penetration without complete retrieval of thin liquids and nectar thick liquids via cup sips, silent aspiration of soft solids, and intraesophageal retention of material.  Laryngeal penetration with complete retrieval of material observed with nectar thickened liquids via tsp.    Disorders: reduced lingual strength/ROM/Rate of motion, reduced BOT to palate and posterior pharyngeal wall contact, delay in trigger of the swallow reflex, reduced hyo-laryngeal elevation and excursion, reduced laryngeal closure, reduced supraglottic sensation, reduced subglottic sensation. Patient presents with oropharyngoesophageal dysphagia characterized by bolus spillover into an open airway with chewables, laryngeal penetration without complete retrieval of thin liquids and nectar thick liquids via cup sips, silent aspiration of soft solids, and intraesophageal residue. Laryngeal penetration with complete retrieval of material observed with nectar thickened liquids via tsp.  Patient is not a candidate for postural strategies.    Disorders: reduced lingual strength/ROM/Rate of motion, reduced BOT to palate and posterior pharyngeal wall contact, delay in trigger of the swallow reflex, reduced hyo-laryngeal elevation and excursion, reduced laryngeal closure, reduced supraglottic sensation, reduced subglottic sensation.

## 2019-03-05 NOTE — SWALLOW VFSS/MBS ASSESSMENT ADULT - ADDITIONAL INFORMATION
+calcifications of the laryngeal/pharyngeal structures  +evidence suggestive of a cricopharyngeal bar

## 2019-03-05 NOTE — SWALLOW VFSS/MBS ASSESSMENT ADULT - PHARYNGEAL PHASE COMMENTS
isolated incident of laryngeal penetration without complete retrieval noted with tsp sip of nectar thick liquids secondary to atypical position for swallowing; pt must keep head in neutral position; limit distractions

## 2019-03-05 NOTE — SWALLOW VFSS/MBS ASSESSMENT ADULT - ORAL PHASE
Reduced anterior - posterior transport/Incomplete tongue to palate contact/Uncontrolled bolus / spillover in zaina-pharynx/Delayed oral transit time/Uncontrolled bolus / spillover in hypopharynx Reduced anterior - posterior transport/Incomplete tongue to palate contact/Uncontrolled bolus / spillover in zaina-pharynx/Delayed oral transit time Uncontrolled bolus / spillover in zaina-pharynx/Delayed oral transit time/Reduced anterior - posterior transport/Incomplete tongue to palate contact/Uncontrolled bolus / spillover in hypopharynx Uncontrolled bolus / spillover in zaina-pharynx/Incomplete tongue to palate contact/Uncontrolled bolus / spillover in hypopharynx Laryngeal penetration before swallow - silent/Reduced anterior - posterior transport/Uncontrolled bolus / spillover in zaina-pharynx/Incomplete tongue to palate contact/Uncontrolled bolus / spillover in hypopharynx

## 2019-03-05 NOTE — SWALLOW VFSS/MBS ASSESSMENT ADULT - ORAL PHASE COMMENTS
Oral transit time noted to be 4.5 seconds.  +Discoordinated lingual movements.  Max spillover to the level of the valleculae with mild spillover into the hypopharynx.  Mild oral residue noted on lingual and palatal surface. Oral transit time noted to be 2 seconds.  +Discoordinated lingual movements.  Max spillover to the level of the valleculae.  Moderate oral residue noted; pt independently triggered successive swallow; residue noted to reduce to trace/mild along lingual/palatal surface. Max spillover to the level of the valleculae and pyriform sinus. Max spillover to the level of the valleculae and pyriform sinus via cup sips; severity of spillover reduced with sips via tsp. Once fluoro reinitiated pt noted with max spillover to the level of the valleculae and along the laryngeal surface of the epiglottis. Upon initiation of fluoro material noted along the laryngeal surface of the epiglottis deep to the level of the vocal folds.

## 2019-03-05 NOTE — SWALLOW VFSS/MBS ASSESSMENT ADULT - ROSENBEK'S PENETRATION ASPIRATION SCALE
(2) contrast enters airway, remains above the vocal cords, no residue remains (penetration) (3) contrast remains above the vocal cords, visible residue remains (penetration) (3) contrast remains above the vocal cords, visible residue remains (penetration)/reduced to 2 with tsp sips; material spontaneously cleared (8) contrast passes glottis, visible subglottic residue remains, absent patient response (aspiration) (8) contrast passes glottis, visible subglottic residue remains, absent patient response (aspiration)/significantly delayed cough; most likely in response to aspiration as material descended

## 2019-03-06 DIAGNOSIS — R13.10 DYSPHAGIA, UNSPECIFIED: ICD-10-CM

## 2019-03-06 LAB
CULTURE RESULTS: SIGNIFICANT CHANGE UP
CULTURE RESULTS: SIGNIFICANT CHANGE UP
INR BLD: 4.97 RATIO — HIGH (ref 0.88–1.16)
INR BLD: 5.29 RATIO — CRITICAL HIGH (ref 0.88–1.16)
PROTHROM AB SERPL-ACNC: 59.6 SEC — HIGH (ref 10–13.1)
PROTHROM AB SERPL-ACNC: 64 SEC — HIGH (ref 10–12.9)
SPECIMEN SOURCE: SIGNIFICANT CHANGE UP
SPECIMEN SOURCE: SIGNIFICANT CHANGE UP

## 2019-03-06 PROCEDURE — 99232 SBSQ HOSP IP/OBS MODERATE 35: CPT

## 2019-03-06 RX ORDER — PHYTONADIONE (VIT K1) 5 MG
2.5 TABLET ORAL ONCE
Qty: 0 | Refills: 0 | Status: COMPLETED | OUTPATIENT
Start: 2019-03-06 | End: 2019-03-06

## 2019-03-06 RX ADMIN — Medication 0.25 MILLIGRAM(S): at 17:11

## 2019-03-06 RX ADMIN — Medication 2.5 MILLIGRAM(S): at 19:05

## 2019-03-06 RX ADMIN — Medication 3 MILLILITER(S): at 06:22

## 2019-03-06 RX ADMIN — Medication 1200 MILLIGRAM(S): at 06:22

## 2019-03-06 RX ADMIN — CEFTRIAXONE 100 GRAM(S): 500 INJECTION, POWDER, FOR SOLUTION INTRAMUSCULAR; INTRAVENOUS at 17:08

## 2019-03-06 RX ADMIN — Medication 3 MILLILITER(S): at 23:54

## 2019-03-06 RX ADMIN — Medication 3 MILLILITER(S): at 12:43

## 2019-03-06 RX ADMIN — LOSARTAN POTASSIUM 50 MILLIGRAM(S): 100 TABLET, FILM COATED ORAL at 06:22

## 2019-03-06 RX ADMIN — SERTRALINE 100 MILLIGRAM(S): 25 TABLET, FILM COATED ORAL at 09:21

## 2019-03-06 RX ADMIN — Medication 3 MILLILITER(S): at 17:08

## 2019-03-06 RX ADMIN — Medication 1200 MILLIGRAM(S): at 17:07

## 2019-03-06 RX ADMIN — OLANZAPINE 10 MILLIGRAM(S): 15 TABLET, FILM COATED ORAL at 21:30

## 2019-03-06 NOTE — DIETITIAN INITIAL EVALUATION ADULT. - OTHER INFO
nutrition consult for Dysphagia 1 diet with nectar thickened liquids. Patient and her daughter visited in room,  S/P MBS with recommendation for diet change. patient has HHA 24 hrs at home, and usually eats chicken mashed potatoes, ice cream, rice krispies; all foods discussed with tips on how to convert to Puree diet with nectar thickened liquids.  No straws, all foods served by teaspoonful in compliance with aspiration precautions.

## 2019-03-06 NOTE — DIETITIAN INITIAL EVALUATION ADULT. - ENERGY NEEDS
HT  5'4"    WT  165   lbs   BMI= 28  AUD=856 lbs +/- 10%   88 y/o Female  with PMH of CVA , HTN, Afib (on coumadin), psychotic depression, wheelchair bound following fall last year, frequent UTI, baseline mental status oriented x3 per family presents with cough, fever x2 days then became confused then more lethargic today. Started on doxycycline per her PMD yesterday. Found to have +hMPV. Dx sepsis. Seen by ID, fever and cough due to Human metapneumovirus URI and pneumonia.

## 2019-03-06 NOTE — DIETITIAN INITIAL EVALUATION ADULT. - NS AS NUTRI INTERV ED CONTENT
thorough diet education provided to patient and her daughter/Nutrition relationship to health/disease/Recommended modifications/Purpose of the nutrition education

## 2019-03-07 ENCOUNTER — TRANSCRIPTION ENCOUNTER (OUTPATIENT)
Age: 84
End: 2019-03-07

## 2019-03-07 DIAGNOSIS — R09.02 HYPOXEMIA: ICD-10-CM

## 2019-03-07 LAB
INR BLD: 2.21 RATIO — HIGH (ref 0.88–1.16)
PROTHROM AB SERPL-ACNC: 25.6 SEC — HIGH (ref 10–13.1)

## 2019-03-07 PROCEDURE — 71045 X-RAY EXAM CHEST 1 VIEW: CPT | Mod: 26

## 2019-03-07 PROCEDURE — 99232 SBSQ HOSP IP/OBS MODERATE 35: CPT

## 2019-03-07 RX ORDER — SODIUM CHLORIDE 9 MG/ML
3 INJECTION INTRAMUSCULAR; INTRAVENOUS; SUBCUTANEOUS EVERY 6 HOURS
Qty: 0 | Refills: 0 | Status: DISCONTINUED | OUTPATIENT
Start: 2019-03-07 | End: 2019-03-09

## 2019-03-07 RX ORDER — WARFARIN SODIUM 2.5 MG/1
1 TABLET ORAL ONCE
Qty: 0 | Refills: 0 | Status: COMPLETED | OUTPATIENT
Start: 2019-03-07 | End: 2019-03-07

## 2019-03-07 RX ADMIN — SERTRALINE 100 MILLIGRAM(S): 25 TABLET, FILM COATED ORAL at 08:32

## 2019-03-07 RX ADMIN — SODIUM CHLORIDE 30 MILLILITER(S): 9 INJECTION, SOLUTION INTRAVENOUS at 00:31

## 2019-03-07 RX ADMIN — Medication 3 MILLILITER(S): at 06:21

## 2019-03-07 RX ADMIN — OLANZAPINE 10 MILLIGRAM(S): 15 TABLET, FILM COATED ORAL at 21:14

## 2019-03-07 RX ADMIN — SODIUM CHLORIDE 3 MILLILITER(S): 9 INJECTION INTRAMUSCULAR; INTRAVENOUS; SUBCUTANEOUS at 23:34

## 2019-03-07 RX ADMIN — LOSARTAN POTASSIUM 50 MILLIGRAM(S): 100 TABLET, FILM COATED ORAL at 06:21

## 2019-03-07 RX ADMIN — Medication 1200 MILLIGRAM(S): at 06:21

## 2019-03-07 RX ADMIN — WARFARIN SODIUM 1 MILLIGRAM(S): 2.5 TABLET ORAL at 21:13

## 2019-03-07 RX ADMIN — Medication 3 MILLILITER(S): at 18:22

## 2019-03-07 RX ADMIN — CEFTRIAXONE 100 GRAM(S): 500 INJECTION, POWDER, FOR SOLUTION INTRAMUSCULAR; INTRAVENOUS at 18:22

## 2019-03-07 RX ADMIN — Medication 1200 MILLIGRAM(S): at 18:45

## 2019-03-07 RX ADMIN — Medication 3 MILLILITER(S): at 12:32

## 2019-03-07 RX ADMIN — Medication 3 MILLILITER(S): at 23:33

## 2019-03-07 NOTE — DISCHARGE NOTE PROVIDER - CARE PROVIDER_API CALL
Les Kearns)  Critical Care Medicine  [address] [facility], [address]  [place] [address]  Phone: [phone]  Fax: [phone]  Follow Up Time: 1 week    Filiberto Condon)  Geriatric Psychiatry; Psychiatry  [address] [place] [address]  Phone: [phone]  Fax: [phone]  Follow Up Time: 1 week    Mahnaz Delvalle  Primary Care MD  Phone: [phone]  Fax: (   )    -  Follow Up Time: 1 week

## 2019-03-07 NOTE — DISCHARGE NOTE PROVIDER - HOSPITAL COURSE
pt w/ sepsis     pna    viral syndrome    abs as per id  to finish today     iv fluid/ dehydration improved    id/ pulm eval noted    ct head neg    mental status improved    a fib    hold coumadin for inc inr    neuro eval noted         cont outpt  meds    pt    d/c planning if inr lower this am 87y old  Female who presents with a chief complaint of Cough, fever, confusion and  lethargy.    PAST MEDICAL & SURGICAL HISTORY:    IBS (irritable bowel syndrome)    Gastritis    Atrial fibrillation    CVA (cerebral infarction)    Smoking    Hypertension    No significant past surgical history    pt w/ sepsis     pna    viral syndrome    s/p abs     iv fluid/ dehydration improved    id/ pulm eval noted    ct head neg    mental status improved    a fib    dose coumadin     neuro eval noted     Cont outpt  meds    pt    d/c planning - cleared by pulm 87y old  Female who presents with a chief complaint of Cough, fever, confusion and  lethargy.    PAST MEDICAL & SURGICAL HISTORY:    IBS (irritable bowel syndrome)    Gastritis    Atrial fibrillation    CVA (cerebral infarction)    Smoking    Hypertension    No significant past surgical history    pt w/ sepsis     pna    viral syndrome    s/p abs     iv fluid/ dehydration improved    id/ pulm eval noted    ct head neg    mental status improved    a fib    dose coumadin     neuro eval noted     Cont outpt  meds    pt    d/c planning - cleared by pulm for discharge home with Oxygen.

## 2019-03-07 NOTE — DISCHARGE NOTE PROVIDER - NSDCCPCAREPLAN_GEN_ALL_CORE_FT
PRINCIPAL DISCHARGE DIAGNOSIS  Problem: Pneumonia  Assessment and Plan of Treatment: Pneumonia is a lung infection that can cause a fever, cough, and trouble breathing.  Continue all antibiotics as ordered until complete.  Nutrition is important, eat small frequent meals.  Get lots of rest and drink fluids.  Call your health care provider upon arrival home from hospital and make a follow up appointment for one week.  If your cough worsens, you develop fever greater than 101', you have shaking chills, a fast heartbeat, trouble breathing and/or feel your are breathing much faster than usual, call your healthcare provider.  Make sure you wash your hands frequently.        SECONDARY DISCHARGE DIAGNOSES  Problem: Hypoxia  Assessment and Plan of Treatment: Continue home Oxygen use.    Problem: Viral URI with cough  Assessment and Plan of Treatment: Drink plenty of fluids.  Seek medical attention for any fever lasting for more than 24 hours.    Problem: Dysphagia  Assessment and Plan of Treatment: Continue Dysphagia diet.  Sit up while eating. PRINCIPAL DISCHARGE DIAGNOSIS  Problem: Pneumonia  Assessment and Plan of Treatment: Pneumonia is a lung infection that can cause a fever, cough, and trouble breathing.  You completed course of IV antibiotics.  Nutrition is important, eat small frequent meals.  Get lots of rest and drink fluids.  Call your health care provider upon arrival home from hospital and make a follow up appointment for one week.  If your cough worsens, you develop fever greater than 101', you have shaking chills, a fast heartbeat, trouble breathing and/or feel your are breathing much faster than usual, call your healthcare provider.  Make sure you wash your hands frequently.        SECONDARY DISCHARGE DIAGNOSES  Problem: Hypoxia  Assessment and Plan of Treatment: Continue home Oxygen use.    Problem: Viral URI with cough  Assessment and Plan of Treatment: Drink plenty of fluids.  Seek medical attention for any fever lasting for more than 24 hours.    Problem: Dysphagia  Assessment and Plan of Treatment: Continue Dysphagia diet.  Sit up while eating.    Problem: Fibrillation, atrial  Assessment and Plan of Treatment: VERY IMPORTANT INSTRUCTIONS:  TAKE COUMADIN 2 MG TONIGHT, 2 MG TOMORROW NIGHT AND 2 MG ON SUNDAY NIGHT.  *** FOLLOW UP WITH YOUR PRIMARY CARE MD ON MONDAY TO HAVE YOUR INR DONE. YOUR MD WILL THEN GIVE YOUI INSTRUCTIONS ON COUMADIN DOSING BASED ON INR (Coumadin level).   Atrial fibrillation is the most common heart rhythm problem.  The condition puts you at risk for has stroke and heart attack  It helps if you control your blood pressure, not drink more than 1-2 alcohol drinks per day, cut down on caffeine, getting treatment for over active thyroid gland, and get regular exercise  Call your doctor if you feel your heart racing or beating unusually, chest tightness or pain, lightheaded, faint, shortness of breath especially with exercise  It is important to take your heart medication as prescribed  You may be on anticoagulation which is very important to take as directed - you may need blood work to monitor drug levels  Call your Cardiologist for any bleeding or call 911 to go to the closest Emergency Room for severe bleeding. PRINCIPAL DISCHARGE DIAGNOSIS  Problem: Pneumonia  Assessment and Plan of Treatment: Pneumonia is a lung infection that can cause a fever, cough, and trouble breathing.  You completed course of IV antibiotics.  Nutrition is important, eat small frequent meals.  Get lots of rest and drink fluids.  Call your health care provider upon arrival home from hospital and make a follow up appointment for one week.  If your cough worsens, you develop fever greater than 101', you have shaking chills, a fast heartbeat, trouble breathing and/or feel your are breathing much faster than usual, call your healthcare provider.  Make sure you wash your hands frequently.        SECONDARY DISCHARGE DIAGNOSES  Problem: Hypoxia  Assessment and Plan of Treatment: Continue home Oxygen use.    Problem: Viral URI with cough  Assessment and Plan of Treatment: Drink plenty of fluids.  Seek medical attention for any fever lasting for more than 24 hours.    Problem: Dysphagia  Assessment and Plan of Treatment: Continue Dysphagia diet.  Sit up while eating.    Problem: Fibrillation, atrial  Assessment and Plan of Treatment: VERY IMPORTANT INSTRUCTIONS:  TAKE COUMADIN 2 MG TONIGHT, 2 MG TOMORROW AND 2 MG ON SUNDAY NIGHT.  *** FOLLOW UP WITH YOUR PRIMARY CARE MD ON MONDAY TO HAVE YOUR INR DONE. YOUR MD WILL THEN GIVE YOU INSTRUCTIONS ON COUMADIN DOSING BASED ON INR (Coumadin level).   Atrial fibrillation is the most common heart rhythm problem.  The condition puts you at risk for has stroke and heart attack  It helps if you control your blood pressure, not drink more than 1-2 alcohol drinks per day, cut down on caffeine, getting treatment for over active thyroid gland, and get regular exercise  Call your doctor if you feel your heart racing or beating unusually, chest tightness or pain, lightheaded, faint, shortness of breath especially with exercise  It is important to take your heart medication as prescribed  You may be on anticoagulation which is very important to take as directed - you may need blood work to monitor drug levels  Call your Cardiologist for any bleeding or call 911 to go to the closest Emergency Room for severe bleeding. PRINCIPAL DISCHARGE DIAGNOSIS  Problem: Pneumonia  Assessment and Plan of Treatment: Pneumonia is a lung infection that can cause a fever, cough, and trouble breathing.  You completed course of IV antibiotics.  Nutrition is important, eat small frequent meals.  Get lots of rest and drink fluids.  Call your health care provider upon arrival home from hospital and make a follow up appointment for one week.  If your cough worsens, you develop fever greater than 101', you have shaking chills, a fast heartbeat, trouble breathing and/or feel your are breathing much faster than usual, call your healthcare provider.  Make sure you wash your hands frequently.        SECONDARY DISCHARGE DIAGNOSES  Problem: Hypoxia  Assessment and Plan of Treatment: Continue  Oxygen 2 Liters use at home.  Follow up with your Lung MD within 1 week.    Problem: Viral URI with cough  Assessment and Plan of Treatment: Drink plenty of fluids.  Seek medical attention for any fever lasting for more than 24 hours.    Problem: Dysphagia  Assessment and Plan of Treatment: Continue Dysphagia diet.  Sit up while eating.    Problem: Fibrillation, atrial  Assessment and Plan of Treatment: VERY IMPORTANT INSTRUCTIONS:  TAKE COUMADIN 2 MG TONIGHT, 2 MG TOMORROW AND 2 MG ON SUNDAY NIGHT.  *** FOLLOW UP WITH YOUR PRIMARY CARE MD ON MONDAY TO HAVE YOUR INR DONE. YOUR MD WILL THEN GIVE YOU INSTRUCTIONS ON COUMADIN DOSING BASED ON INR (Coumadin level).   Atrial fibrillation is the most common heart rhythm problem.  The condition puts you at risk for has stroke and heart attack  It helps if you control your blood pressure, not drink more than 1-2 alcohol drinks per day, cut down on caffeine, getting treatment for over active thyroid gland, and get regular exercise  Call your doctor if you feel your heart racing or beating unusually, chest tightness or pain, lightheaded, faint, shortness of breath especially with exercise  It is important to take your heart medication as prescribed  You may be on anticoagulation which is very important to take as directed - you may need blood work to monitor drug levels  Call your Cardiologist for any bleeding or call 911 to go to the closest Emergency Room for severe bleeding.

## 2019-03-07 NOTE — DISCHARGE NOTE PROVIDER - PROVIDER TOKENS
PROVIDER:[TOKEN:[[id number]],FOLLOWUP:[1 week]],PROVIDER:[TOKEN:[[id number]],FOLLOWUP:[1 week]],FREE:[LAST:[Mook],FIRST:[Mahnaz Roach],PHONE:[phone]],FAX:[phone]   [phone]    -],ADDRESS:[Primary Care MD],FOLLOWUP:[1 week]]

## 2019-03-08 ENCOUNTER — TRANSCRIPTION ENCOUNTER (OUTPATIENT)
Age: 84
End: 2019-03-08

## 2019-03-08 DIAGNOSIS — F05 DELIRIUM DUE TO KNOWN PHYSIOLOGICAL CONDITION: ICD-10-CM

## 2019-03-08 DIAGNOSIS — F33.9 MAJOR DEPRESSIVE DISORDER, RECURRENT, UNSPECIFIED: ICD-10-CM

## 2019-03-08 LAB
HCT VFR BLD CALC: 31.3 % — LOW (ref 34.5–45)
HGB BLD-MCNC: 11.1 G/DL — LOW (ref 11.5–15.5)
INR BLD: 1.37 RATIO — HIGH (ref 0.88–1.16)
MCHC RBC-ENTMCNC: 31.1 PG — SIGNIFICANT CHANGE UP (ref 27–34)
MCHC RBC-ENTMCNC: 35.6 GM/DL — SIGNIFICANT CHANGE UP (ref 32–36)
MCV RBC AUTO: 87.4 FL — SIGNIFICANT CHANGE UP (ref 80–100)
PLATELET # BLD AUTO: 199 K/UL — SIGNIFICANT CHANGE UP (ref 150–400)
PROTHROM AB SERPL-ACNC: 15.8 SEC — HIGH (ref 10–12.9)
RBC # BLD: 3.58 M/UL — LOW (ref 3.8–5.2)
RBC # FLD: 14.4 % — SIGNIFICANT CHANGE UP (ref 10.3–14.5)
WBC # BLD: 6.2 K/UL — SIGNIFICANT CHANGE UP (ref 3.8–10.5)
WBC # FLD AUTO: 6.2 K/UL — SIGNIFICANT CHANGE UP (ref 3.8–10.5)

## 2019-03-08 PROCEDURE — 99222 1ST HOSP IP/OBS MODERATE 55: CPT

## 2019-03-08 RX ORDER — SERTRALINE 25 MG/1
1 TABLET, FILM COATED ORAL
Qty: 0 | Refills: 0 | DISCHARGE
Start: 2019-03-08

## 2019-03-08 RX ORDER — SERTRALINE 25 MG/1
1 TABLET, FILM COATED ORAL
Qty: 0 | Refills: 0 | COMMUNITY

## 2019-03-08 RX ORDER — WARFARIN SODIUM 2.5 MG/1
2 TABLET ORAL ONCE
Qty: 0 | Refills: 0 | Status: COMPLETED | OUTPATIENT
Start: 2019-03-08 | End: 2019-03-08

## 2019-03-08 RX ORDER — ALPRAZOLAM 0.25 MG
1 TABLET ORAL
Qty: 10 | Refills: 0 | OUTPATIENT
Start: 2019-03-08 | End: 2019-03-12

## 2019-03-08 RX ADMIN — Medication 1200 MILLIGRAM(S): at 06:14

## 2019-03-08 RX ADMIN — Medication 3 MILLILITER(S): at 06:15

## 2019-03-08 RX ADMIN — SODIUM CHLORIDE 3 MILLILITER(S): 9 INJECTION INTRAMUSCULAR; INTRAVENOUS; SUBCUTANEOUS at 06:14

## 2019-03-08 RX ADMIN — Medication 1200 MILLIGRAM(S): at 17:30

## 2019-03-08 RX ADMIN — Medication 3 MILLILITER(S): at 23:41

## 2019-03-08 RX ADMIN — SERTRALINE 100 MILLIGRAM(S): 25 TABLET, FILM COATED ORAL at 08:51

## 2019-03-08 RX ADMIN — LOSARTAN POTASSIUM 50 MILLIGRAM(S): 100 TABLET, FILM COATED ORAL at 06:14

## 2019-03-08 RX ADMIN — SODIUM CHLORIDE 3 MILLILITER(S): 9 INJECTION INTRAMUSCULAR; INTRAVENOUS; SUBCUTANEOUS at 23:42

## 2019-03-08 RX ADMIN — SODIUM CHLORIDE 3 MILLILITER(S): 9 INJECTION INTRAMUSCULAR; INTRAVENOUS; SUBCUTANEOUS at 17:30

## 2019-03-08 RX ADMIN — SODIUM CHLORIDE 3 MILLILITER(S): 9 INJECTION INTRAMUSCULAR; INTRAVENOUS; SUBCUTANEOUS at 12:23

## 2019-03-08 RX ADMIN — Medication 3 MILLILITER(S): at 12:22

## 2019-03-08 RX ADMIN — Medication 3 MILLILITER(S): at 17:30

## 2019-03-08 RX ADMIN — OLANZAPINE 10 MILLIGRAM(S): 15 TABLET, FILM COATED ORAL at 23:41

## 2019-03-08 RX ADMIN — WARFARIN SODIUM 2 MILLIGRAM(S): 2.5 TABLET ORAL at 23:41

## 2019-03-08 NOTE — DISCHARGE NOTE NURSING/CASE MANAGEMENT/SOCIAL WORK - NSDCPEWEB_GEN_ALL_CORE
[place] website --- [email or web address] Tobacco [email or web address] website --- [email or web address]

## 2019-03-08 NOTE — DISCHARGE NOTE NURSING/CASE MANAGEMENT/SOCIAL WORK - NSDCPEPTCOWAR_GEN_ALL_CORE
Coumadin/Warfarin - Potential for adverse drug reactions and interactions/Coumadin/Warfarin - Compliance/Coumadin/Warfarin - Dietary Advice/Coumadin/Warfarin - Follow up monitoring

## 2019-03-08 NOTE — BEHAVIORAL HEALTH ASSESSMENT NOTE - NSBHSOCIALHXDETAILSFT_PSY_A_CORE
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

## 2019-03-08 NOTE — BEHAVIORAL HEALTH ASSESSMENT NOTE - NSBHCHARTREVIEWVS_PSY_A_CORE FT
Vital Signs Last 24 Hrs  T(C): 36.6 (08 Mar 2019 13:10), Max: 37.1 (08 Mar 2019 08:24)  T(F): 97.9 (08 Mar 2019 13:10), Max: 98.7 (08 Mar 2019 08:24)  HR: 101 (08 Mar 2019 13:10) (88 - 101)  BP: 155/86 (08 Mar 2019 13:10) (106/70 - 160/82)  BP(mean): --  RR: 18 (08 Mar 2019 13:10) (18 - 18)  SpO2: 85% (08 Mar 2019 13:33) (85% - 97%)

## 2019-03-08 NOTE — DISCHARGE NOTE NURSING/CASE MANAGEMENT/SOCIAL WORK - NSDCDPATPORTLINK_GEN_ALL_CORE
You can access the [facility] Patient Portal, offered by [facility], by registering with the following website: [email or web address]

## 2019-03-08 NOTE — BEHAVIORAL HEALTH ASSESSMENT NOTE - HPI (INCLUDE ILLNESS QUALITY, SEVERITY, DURATION, TIMING, CONTEXT, MODIFYING FACTORS, ASSOCIATED SIGNS AND SYMPTOMS)
86 y/o domiciled with health aide,  woman with a PPHx significant for MDD with psychotic features and anxiety, no prior psych admissions, in current psych tx at [facility] clinic with DR. Condon, on zoloft, zyprexa at home,  and PMHx significant for CVA , HTN, Afib currently admitted for aspiration pneumonia and viral URI who is now being evaluated for delirium. Dr. Condon, pt's family and primary team concerned because pt has become more perseverative, less cognizant of her surroundings, and less focused in recent days, would like evaluation for delirium.   Pt poor historian, couldn't explain recent events in details. PT states she wasn't feeling well the past few days. Pt denies pain currnently, no physical complaints. Pt denied depressed mood, anhedonia, reports poor sleep at home, fair appetite. Pt denies si/hi. Pt denies anxiety, paranoia, hallucinations, case.    Per daughters, pt was admitted 1 week ago for pneumonia and started on steroids, which made the pt agitated, nervous, and hyper. They report that, since steroids were stopped 2 days ago, pt has continued to be nervous and anxious, as well as shaky. Daughters report that pt has been staring off into space, acting very nervous, and repeatedly saying, "come on" to them. They reported that pt's   6-7 years ago and that there were other family stressors at the time, which provoked a "nervous breakdown" and culminated in a diagnosis of MDD with psychotic features. Per daughters, pt has been sleeping normally and had a normal appetite (although she dislikes her pureed diet). They state that pt has never lost interest in her hobbies, which includes gambling. Pt's daughters also report that pt has a history of anxiety, for which she takes Ativan at home. They state that she occasionally has panic attacks. They noted that pt lives alone at her home with a health aide, and that she has been wheelchair bound for years secondary to weakness, depression, and fear of falling, as pt has a history of mechanical fall with subsequent pelvic and sacral fractures. Daughters deny any episodes of case, SI/HI, or history of trauma. They say that her MDD psychosis does not consist of AVH, but that pt has been having visual hallucinations of a dog in her hospital room over the past couple of days. They state that pt has never had a psychiatric hospitalization, and deny any family psychiatric history.     Spoke with pt's psychiatrist Dr. Condon, who states that pt has a diagnosis of MDD with psychotic features and that she is very prone to delirium and sensitive to steroids. She states that she suggested a psychiatric consultation after hearing from pt's daughters that pt had become more altered and perseverative. no concerns for pt safety at this time. No recent medication changes.

## 2019-03-08 NOTE — PROGRESS NOTE ADULT - PROBLEM SELECTOR PROBLEM 1
Hypoxia
Aspiration pneumonia
Aspiration pneumonia
Hypoxia
Viral URI with cough
Viral URI with cough
Aspiration pneumonia

## 2019-03-08 NOTE — PROGRESS NOTE ADULT - PROBLEM SELECTOR PLAN 1
Continued hypoxia 2nd secretions/PNA with poor cough  -Check CXR  -Start 3% saline nebs q6  -Mucinex BID  -OOB as tolerated  -Incentive spirometer
2nd decompensated HF  -Set up for home oxygen 2L NC
RUL, RLL dependent opacities on CT chest concerning for superimposed aspiration PNA d/t AMS with viral illness  -Started on Azithromycin/Ceftraixone on 3/1   -check urine legionella
RUL, RLL dependent opacities on CT chest concerning for superimposed aspiration PNA d/t AMS with viral illness  -c/w Ceftriaxone (Day 5)   -Encouraged Pulmonary toilet   -Duoneb q6  -f/u MBS results
ct SCAN CHEST REVIEWED: CONT ANTIBIOTICS: id reviewed
ct SCAN CHEST REVIEWED: CONT ANTIBIOTICS: id reviewed  3/3: wheezing today : started on steroids: Has M PV infection!
RUL, RLL dependent opacities on CT chest concerning for superimposed aspiration PNA d/t AMS with viral illness  -c/w Ceftriaxone (Day 6)   -Encouraged Pulmonary toilet   -Duoneb q6  -95% on RA today, d/c planning from pulm perspective

## 2019-03-08 NOTE — PROGRESS NOTE ADULT - PROBLEM SELECTOR PROBLEM 2
Aspiration pneumonia
Altered mental status
Altered mental status
Aspiration pneumonia
Viral URI with cough
Viral URI with cough
Dysphagia

## 2019-03-08 NOTE — DISCHARGE NOTE NURSING/CASE MANAGEMENT/SOCIAL WORK - NSDCPEEMAIL_GEN_ALL_CORE
[facility] for Tobacco Control email [email or web address]

## 2019-03-08 NOTE — BEHAVIORAL HEALTH ASSESSMENT NOTE - NSBHREFERDETAILS_PSY_A_CORE_FT
history of anxiety and depression with psychotic features, more perseverative and new AMS, concern for delirium

## 2019-03-08 NOTE — PROGRESS NOTE ADULT - PROBLEM SELECTOR PLAN 3
2nd hMPV  -Suspect wheeze more likely r/t secretions than bronchospasm   -Duoneb q6  -Mucinex BID
c/w Dysphagia 1 diet with nectar thick liquids based on MBS results
Improved,   -CT head grossly normal  -No evidence of CO2 narcosis  -Likely metabolic encephalopathy
Improving  -CT head grossly normal  -No evidence of CO2 narcosis  -Likely metabolic encephalopathy
c/w Dysphagia 1 diet with nectar thick liquids based on MBS results  -Repeat MBS as outpt for diet upgrade

## 2019-03-08 NOTE — CHART NOTE - NSCHARTNOTEFT_GEN_A_CORE
Pt has history of Diastolic Heart Failure and will require Home Oxygen.  Pt has been treated for Pneumonia which has resolved.

## 2019-03-08 NOTE — BEHAVIORAL HEALTH ASSESSMENT NOTE - SUMMARY
88 y/o domiciled with health aide,  woman with a PPHx significant for MDD with psychotic features and anxiety, and PMHx significant for CVA , HTN, Afib currently admitted for aspiration pneumonia and viral URI who is now being evaluated for delirium. Per daughters, pt has a diagnosis of MDD with psychotic features and has been managed by Dr. Condon at [facility]. Spoke with Dr. Condon, who states that she suggested a consult due to pt's daughters' descriptions of pt being more perseverative and less cognizant. On exam, pt was AAOx2 and cooperative, poor historian, no acute mood symptoms, psychosis noted currently, no agitation. No si/hi.  Daughters report that pt has been nervous and anxious, shaky, staring off into space, and repeatedly saying, "come on" to them. They state that pt has had visual hallucinations of a dog in her room in the past few days. Per daughters, pt has been sleeping normally with normal appetite. Denies case, SI/HI, or history of trauma.

## 2019-03-08 NOTE — BEHAVIORAL HEALTH ASSESSMENT NOTE - NSBHCONSULTMEDS_PSY_A_CORE FT
cont zoloft 100mg po qdaily, zyprexa 10mg po qhs, can give haldol 1mg po/iv q6hr prn severe agitation. would monitor mental status over the next few days, if worsening then can consider holding zoloft.

## 2019-03-08 NOTE — BEHAVIORAL HEALTH ASSESSMENT NOTE - RISK ASSESSMENT
Pt has a good family support system, as well as residential and family stability. Her main risk factor is chronic illness, though this is mitigated by multiple protective factors. Denies SI/HI. Pt is at low risk and does not warrant inpatient psychiatric stay at this time.

## 2019-03-08 NOTE — DISCHARGE NOTE NURSING/CASE MANAGEMENT/SOCIAL WORK - NSDCPEPTCOWACOMP_GEN_ALL_CORE
The patient has received written discharge instructions for Coumadin/Warfarin, including the Coumadin/Warfarin discharge booklet, which contains all of the information listed below. Coumadin/Warfarin is used to prevent new blood clots, and keep existing ones from getting bigger. Never skip a dose of Coumadin. If you forget to take your Coumadin/Warfarin, DO NOT take an extra pill to 'catch up'. Notify your doctor that you missed a dose.    NEVER TAKE DOUBLE DOSE    Take Coumadin/Warfarin in the evening at the same time    Coumadin/Warfarin may be taken with other medications or food

## 2019-03-08 NOTE — PROGRESS NOTE ADULT - PROBLEM SELECTOR PLAN 4
2nd hMPV  -Suspect wheeze more likely r/t secretions than bronchospasm   -Duoneb q6  -Mucinex BID 2nd hMPV  -Suspect wheeze more likely r/t secretions than bronchospasm   -Duoneb q6h  -Mucinex BID

## 2019-03-08 NOTE — DISCHARGE NOTE NURSING/CASE MANAGEMENT/SOCIAL WORK - NSSCNAMETXT_GEN_ALL_CORE
[facility] [phone] Start of Care 3/9 Services requested: RN, Physical Therapy & Speech Therapy. A nurse will call to set up an appointment.

## 2019-03-08 NOTE — BEHAVIORAL HEALTH ASSESSMENT NOTE - NSBHCHARTREVIEWIMAGING_PSY_A_CORE FT
CT BRAIN (03/01/2019)    COMPARISON: CT head dated 6/4/2018, 8/15/2017, 1/5/2013. MR brain dated 6/5/2018.  FINDINGS: There has been no interval change. Mild prominence of the sulci and ventricles are consistent with moderate volume loss. There are hemispheric white matter areas of low attenuation which are nonspecific but likely related to sequelae of severe severity microvascular disease. There is no intraparenchymal hematoma, mass effect   or midline shift. No abnormal extra-axial fluid collections are present. There is no evidence of acute transcortical territorial infarction. The calvarium is intact. The visualized intraorbital compartments, paranasal sinuses and tympanomastoid cavities appear free of acute disease.  IMPRESSION: No interval change. Severe severity microvascular ischemic change. No acute intracranial hemorrhage.

## 2019-03-08 NOTE — BEHAVIORAL HEALTH ASSESSMENT NOTE - OTHER PAST PSYCHIATRIC HISTORY (INCLUDE DETAILS REGARDING ONSET, COURSE OF ILLNESS, INPATIENT/OUTPATIENT TREATMENT)
MDD with psychotic features (diagnosis made 6-7 years ago) - Zoloft 100mg in AM, Olanzapine 10mg in PM  Anxiety - Ativan 0.25 PRN at home, no hx psych admissions, no hx suicide attempts

## 2019-03-08 NOTE — BEHAVIORAL HEALTH ASSESSMENT NOTE - NSBHCHARTREVIEWINVESTIGATE_PSY_A_CORE FT
ECG (3/1/19)  Ventricular Rate 79 BPM  Atrial Rate 105 BPM  QRS Duration 66 ms  Q-T Interval 394 ms  QTC Calculation(Bezet) 451 ms  R Axis -9 degrees  T Axis 3 degrees

## 2019-03-08 NOTE — PROGRESS NOTE ADULT - PROBLEM SELECTOR PLAN 2
RUL, RLL dependent opacities on CT chest concerning for superimposed aspiration PNA d/t AMS with viral illness  -c/w Ceftriaxone (Day 7)   -Encouraged Pulmonary toilet   -Duoneb q6
2nd hMPV  -Suspect wheeze more likely r/t secretions than bronchospasm   -Decrease to prednisone 40mg qd to complete 4 days total   -Duoneb q6  -Mucinex BID
2nd hMPV  -Suspect wheeze more likely r/t secretions than bronchospasm   -d/c Prednisone   -Duoneb q6  -Mucinex BID
RUL, RLL dependent opacities on CT chest concerning for superimposed aspiration PNA d/t AMS with viral illness  -s/p 7 days Ceftriaxone   -Encouraged Pulmonary toilet   -Duoneb q6  -Mucinex BID  -3% inhaled saline while inpt
seems to be doing better; she is alert and awake and responding to simple questions now!
seems to be doing better; she is alert and awake and responding to simple questions now!  3/3: Seems to have improved: She is alert and awake and able to respond to simple questions!!
c/w Dysphagia 1 diet with nectar thick liquids based on MBS results

## 2019-03-09 VITALS
SYSTOLIC BLOOD PRESSURE: 136 MMHG | DIASTOLIC BLOOD PRESSURE: 95 MMHG | HEART RATE: 90 BPM | OXYGEN SATURATION: 93 % | TEMPERATURE: 97 F | RESPIRATION RATE: 19 BRPM

## 2019-03-09 LAB
ANION GAP SERPL CALC-SCNC: 12 MMOL/L — SIGNIFICANT CHANGE UP (ref 5–17)
APTT BLD: 28.6 SEC — SIGNIFICANT CHANGE UP (ref 27.5–36.3)
BUN SERPL-MCNC: 22 MG/DL — SIGNIFICANT CHANGE UP (ref 7–23)
CALCIUM SERPL-MCNC: 8.8 MG/DL — SIGNIFICANT CHANGE UP (ref 8.4–10.5)
CHLORIDE SERPL-SCNC: 106 MMOL/L — SIGNIFICANT CHANGE UP (ref 96–108)
CO2 SERPL-SCNC: 25 MMOL/L — SIGNIFICANT CHANGE UP (ref 22–31)
CREAT SERPL-MCNC: 1.09 MG/DL — SIGNIFICANT CHANGE UP (ref 0.5–1.3)
GLUCOSE SERPL-MCNC: 91 MG/DL — SIGNIFICANT CHANGE UP (ref 70–99)
HCT VFR BLD CALC: 33.7 % — LOW (ref 34.5–45)
HGB BLD-MCNC: 10.6 G/DL — LOW (ref 11.5–15.5)
INR BLD: 1.22 RATIO — HIGH (ref 0.88–1.16)
MCHC RBC-ENTMCNC: 28.4 PG — SIGNIFICANT CHANGE UP (ref 27–34)
MCHC RBC-ENTMCNC: 31.5 GM/DL — LOW (ref 32–36)
MCV RBC AUTO: 90.3 FL — SIGNIFICANT CHANGE UP (ref 80–100)
PLATELET # BLD AUTO: 235 K/UL — SIGNIFICANT CHANGE UP (ref 150–400)
POTASSIUM SERPL-MCNC: 4 MMOL/L — SIGNIFICANT CHANGE UP (ref 3.5–5.3)
POTASSIUM SERPL-SCNC: 4 MMOL/L — SIGNIFICANT CHANGE UP (ref 3.5–5.3)
PROTHROM AB SERPL-ACNC: 14 SEC — HIGH (ref 10–13.1)
RBC # BLD: 3.73 M/UL — LOW (ref 3.8–5.2)
RBC # FLD: 14.8 % — HIGH (ref 10.3–14.5)
SODIUM SERPL-SCNC: 143 MMOL/L — SIGNIFICANT CHANGE UP (ref 135–145)
WBC # BLD: 7.37 K/UL — SIGNIFICANT CHANGE UP (ref 3.8–10.5)
WBC # FLD AUTO: 7.37 K/UL — SIGNIFICANT CHANGE UP (ref 3.8–10.5)

## 2019-03-09 PROCEDURE — 99285 EMERGENCY DEPT VISIT HI MDM: CPT | Mod: 25

## 2019-03-09 PROCEDURE — 83735 ASSAY OF MAGNESIUM: CPT

## 2019-03-09 PROCEDURE — 51701 INSERT BLADDER CATHETER: CPT

## 2019-03-09 PROCEDURE — 82803 BLOOD GASES ANY COMBINATION: CPT

## 2019-03-09 PROCEDURE — 82330 ASSAY OF CALCIUM: CPT

## 2019-03-09 PROCEDURE — 83605 ASSAY OF LACTIC ACID: CPT

## 2019-03-09 PROCEDURE — 85610 PROTHROMBIN TIME: CPT

## 2019-03-09 PROCEDURE — 92611 MOTION FLUOROSCOPY/SWALLOW: CPT

## 2019-03-09 PROCEDURE — 97116 GAIT TRAINING THERAPY: CPT

## 2019-03-09 PROCEDURE — 87581 M.PNEUMON DNA AMP PROBE: CPT

## 2019-03-09 PROCEDURE — 84100 ASSAY OF PHOSPHORUS: CPT

## 2019-03-09 PROCEDURE — 71045 X-RAY EXAM CHEST 1 VIEW: CPT

## 2019-03-09 PROCEDURE — 70450 CT HEAD/BRAIN W/O DYE: CPT

## 2019-03-09 PROCEDURE — 74230 X-RAY XM SWLNG FUNCJ C+: CPT

## 2019-03-09 PROCEDURE — 87486 CHLMYD PNEUM DNA AMP PROBE: CPT

## 2019-03-09 PROCEDURE — 85027 COMPLETE CBC AUTOMATED: CPT

## 2019-03-09 PROCEDURE — 97161 PT EVAL LOW COMPLEX 20 MIN: CPT

## 2019-03-09 PROCEDURE — 97110 THERAPEUTIC EXERCISES: CPT

## 2019-03-09 PROCEDURE — 84443 ASSAY THYROID STIM HORMONE: CPT

## 2019-03-09 PROCEDURE — 85730 THROMBOPLASTIN TIME PARTIAL: CPT

## 2019-03-09 PROCEDURE — 87633 RESP VIRUS 12-25 TARGETS: CPT

## 2019-03-09 PROCEDURE — 87040 BLOOD CULTURE FOR BACTERIA: CPT

## 2019-03-09 PROCEDURE — 84484 ASSAY OF TROPONIN QUANT: CPT

## 2019-03-09 PROCEDURE — 92610 EVALUATE SWALLOWING FUNCTION: CPT

## 2019-03-09 PROCEDURE — 96375 TX/PRO/DX INJ NEW DRUG ADDON: CPT | Mod: XU

## 2019-03-09 PROCEDURE — 80048 BASIC METABOLIC PNL TOTAL CA: CPT

## 2019-03-09 PROCEDURE — 80053 COMPREHEN METABOLIC PANEL: CPT

## 2019-03-09 PROCEDURE — 93005 ELECTROCARDIOGRAM TRACING: CPT | Mod: XU

## 2019-03-09 PROCEDURE — 82962 GLUCOSE BLOOD TEST: CPT

## 2019-03-09 PROCEDURE — 87798 DETECT AGENT NOS DNA AMP: CPT

## 2019-03-09 PROCEDURE — 87086 URINE CULTURE/COLONY COUNT: CPT

## 2019-03-09 PROCEDURE — 85014 HEMATOCRIT: CPT

## 2019-03-09 PROCEDURE — 84132 ASSAY OF SERUM POTASSIUM: CPT

## 2019-03-09 PROCEDURE — 71250 CT THORAX DX C-: CPT

## 2019-03-09 PROCEDURE — 96374 THER/PROPH/DIAG INJ IV PUSH: CPT | Mod: XU

## 2019-03-09 PROCEDURE — 81001 URINALYSIS AUTO W/SCOPE: CPT

## 2019-03-09 PROCEDURE — 97530 THERAPEUTIC ACTIVITIES: CPT

## 2019-03-09 PROCEDURE — 94640 AIRWAY INHALATION TREATMENT: CPT

## 2019-03-09 PROCEDURE — 82435 ASSAY OF BLOOD CHLORIDE: CPT

## 2019-03-09 PROCEDURE — 82947 ASSAY GLUCOSE BLOOD QUANT: CPT

## 2019-03-09 PROCEDURE — 84295 ASSAY OF SERUM SODIUM: CPT

## 2019-03-09 RX ORDER — ALPRAZOLAM 0.25 MG
1 TABLET ORAL
Qty: 10 | Refills: 0
Start: 2019-03-09 | End: 2019-03-13

## 2019-03-09 RX ADMIN — Medication 1200 MILLIGRAM(S): at 06:56

## 2019-03-09 RX ADMIN — SERTRALINE 100 MILLIGRAM(S): 25 TABLET, FILM COATED ORAL at 09:37

## 2019-03-09 RX ADMIN — LOSARTAN POTASSIUM 50 MILLIGRAM(S): 100 TABLET, FILM COATED ORAL at 06:57

## 2019-03-09 RX ADMIN — SODIUM CHLORIDE 3 MILLILITER(S): 9 INJECTION INTRAMUSCULAR; INTRAVENOUS; SUBCUTANEOUS at 06:58

## 2019-03-09 RX ADMIN — Medication 3 MILLILITER(S): at 07:03

## 2019-03-09 NOTE — PROGRESS NOTE ADULT - ASSESSMENT
86 y/o F with PMH of CVA (no residual defecit), HTN, Afib (on coumadin), psychotic depression, wheelchair bound following fall last year, frequent UTI, baseline mental status oriented x3 per family presents with cough, fever x2 days then became confused then more somnolent today. Started on doxycycline per her PMD yesterday. Found to have +hMPV. In ER patient obtunded, minimally responsive. VBG with normal pH, no evidence of CO2 narcosis. CT chest with RUL, RLL infiltrates concerning for superimposed aspiration PNA
86 y/o F with PMH of CVA (no residual defecit), HTN, Afib (on coumadin), psychotic depression, wheelchair bound following fall last year, frequent UTI, baseline mental status oriented x3 per family presents with cough, fever x2 days then became confused then more somnolent today. Started on doxycycline per her PMD yesterday. Found to have +hMPV. In ER patient obtunded, minimally responsive. VBG with normal pH, no evidence of CO2 narcosis. CT chest with RUL, RLL infiltrates concerning for superimposed aspiration PNA
87 F with A fib, CVA, HTN, gastritis, bipolar, IBS, ex smoker, recurrent UTI's brought in by family due to worsening mental status, with dry cough for 1 week.  In ED temp 101.2 F/101/154/86/16/89% . Patient obtunded on exam with some wheezing, no neck rigidity  Labs with WBC 6 with left shift, , BUN 24, lactate 1.1, CXR (-)  UA (-),  RVP + hMPV. CT chest multiple nodular and hazy opacities in dependent portions    # fever and cough due to Human metapneumovirus URI and pneumonia  with no WBC doubt that this is bacterial  evidence of aspiration on speech and swallow but pt improved on ceftriaxone anyway  AMS improved, ?septic metabolic      * on ceftriaxone day 6, started 3/1  * will complete a 7 day course  * if patient is ready for DC, today can switch to augmentin, otherwise, can DC ceftriaxone after tomorrow's dose  * monitor the CBC  * f/u with neurology and pulm
87 F with A fib, CVA, HTN, gastritis, bipolar, IBS, ex smoker, recurrent UTI's brought in by family due to worsening mental status, with dry cough for 1 week.  In ED temp 101.2 F/101/154/86/16/89% . Patient obtunded on exam with some wheezing, no neck rigidity  Labs with WBC 6 with left shift, , BUN 24, lactate 1.1, CXR (-)  UA (-),  RVP + hMPV. CT chest multiple nodular and hazy opacities in dependent portions    # fever and cough due to Human metapneumovirus URI and pneumonia  with no WBC doubt that this is bacterial  evidence of aspiration on speech and swallow but pt improved on ceftriaxone anyway  AMS improved, ?septic metabolic      * s/p 7 days of ceftriaxone started 3/1  * DC ceftriaxone  * will sign off, please call with questions
87 F with A fib, CVA, HTN, gastritis, bipolar, IBS, ex smoker, recurrent UTI's brought in by family due to worsening mental status, with dry cough for 1 week.  In ED temp 101.2 F/101/154/86/16/89% . Patient obtunded on exam with some wheezing, no neck rigidity  Labs with WBC 6 with left shift, , BUN 24, lactate 1.1, CXR (-)  UA (-),  RVP + hMPV. CT chest multiple nodular and hazy opacities in dependent portions    # fever and cough due to Human metapneumovirus URI and pneumonia  with no WBC doubt that this is bacterial, no h/o aspiration as per family  AMS improved, ?septic metabolic  new leukopenia    * on ceftriaxone day 4, started 3/1  * WBC are going down, ?medication induced  * monitor the CBC  * f/u with neurology and pulm
87 F with A fib, CVA, HTN, gastritis, bipolar, IBS, ex smoker, recurrent UTI's brought in by family due to worsening mental status, with dry cough for 1 week.  In ED temp 101.2 F/101/154/86/16/89% . Patient obtunded on exam with some wheezing, no neck rigidity  Labs with WBC 6 with left shift, , BUN 24, lactate 1.1, CXR (-)  UA (-),  RVP + hMPV. CT chest multiple nodular and hazy opacities in dependent portions    # fever and cough due to Human metapneumovirus URI and pneumonia  with no WBC doubt that this is bacterial, no h/o aspiration as per family  AMS improved, ?septic metabolic  new leukopenia likely due to ceftriaxone    * on ceftriaxone day 5, started 3/1  * WBC are going down, ?medication induced  * will complete a 7 day course  * monitor the CBC  * f/u with neurology and pulm
88 y/o F with PMH of CVA (no residual defecit), HTN, Afib (on coumadin), psychotic depression, wheelchair bound following fall last year, frequent UTI, baseline mental status oriented x3 per family presents with cough, fever x2 days then became confused then more somnolent today. Started on doxycycline per her PMD yesterday. Found to have +hMPV. In ER patient obtunded, minimally responsive. VBG with normal pH, no evidence of CO2 narcosis. 95% on 2L NC
88 y/o F with PMH of CVA (no residual defecit), HTN, Afib (on coumadin), psychotic depression, wheelchair bound following fall last year, frequent UTI, baseline mental status oriented x3 per family presents with cough, fever x2 days then became confused then more somnolent today. Started on doxycycline per her PMD yesterday. Found to have +hMPV. In ER patient obtunded, minimally responsive. VBG with normal pH, no evidence of CO2 narcosis. 95% on 2L NC
88 y/o F with PMH of CVA (no residual defecit), HTN, Afib (on coumadin), psychotic depression, wheelchair bound following fall last year, frequent UTI, baseline mental status oriented x3 per family presents with cough, fever x2 days then became confused then more somnolent today. Started on doxycycline per her PMD yesterday. Found to have +hMPV. In ER patient obtunded, minimally responsive. VBG with normal pH, no evidence of CO2 narcosis. CT chest with RUL, RLL infiltrates concerning for superimposed aspiration PNA
88 y/o F with PMH of HFpEF (Grade II), CVA (no residual defecit), HTN, Afib (on coumadin), psychotic depression, wheelchair bound following fall last year, frequent UTI, baseline mental status oriented x3 per family presents with cough, fever x2 days then became confused then more somnolent today. Started on doxycycline per her PMD yesterday. Found to have +hMPV. In ER patient obtunded, minimally responsive. VBG with normal pH, no evidence of CO2 narcosis. CT chest with RUL, RLL infiltrates concerning for superimposed aspiration PNA
Imp/Rx:  hMTV infection.  Possible secondary pna  doubt any atypical bacterial pna.    can continue ceftriaxone for a limited course.  jacky woods
This is a 87y Female, here with hx of psychosis, depression past 6 years since her husbands death, wheelchair bound after fall, here with increased confusion, likely underlying dementia, in setting of HMPV and possible PNA.  Exam has improved in hospital.  Near baseline.  CT head with severe chronic white matter ischemic changes.  Not clear to me if parkinsonism is from concordant vascular dementia vs tardive effects of psych regimen    Plan:  - discussed with daughter by my associate dr. link  - would continue same zoloft/zyprexa  - no need for MRI/EEG  - abx per ID  - PT  - limit sedating meds  - no further inpt neuro w/u needed  d/w dr [facility]
pt w/ sepsis   pna  viral syndrome  abs as per id  2 more days   iv fluid/ dehydration improved  id/ pulm eval noted  ct head neg  mental status improved  a fib  cont a/c  dose coumadin   hold today for inc inr   neuro eval noted     cont outpt  meds  pt  swallow eval   d/c planning in next 48 hours
pt w/ sepsis   pna  viral syndrome  abs as per id  iv fluid/ dehydration improved  id/ pulm eval noted  ct head neg  mental status improved  a fib  cont a/c  dose coumadin   neuro eval noted     cont outpt  meds  pt  swallow eval
pt w/ sepsis   pna  viral syndrome  abs as per id  iv fluid/ dehydration improved  id/ pulm eval noted  ct head neg  mental status improved  a fib  cont a/c  dose coumadin   neuro eval noted     cont outpt  meds  pt  swallow eval   discussed w/ daughter this am
pt w/ sepsis   pna  viral syndrome  abs as per id  iv fluid/ dehydration improved  id/ pulm eval noted  ct head neg  mental status much improved  a fib  cont a/c  dose coumadin   neuro eval    cont outpt  meds  pt  swallow eval
pt w/ sepsis   pna  viral syndrome  abs as per id  to finish today   iv fluid/ dehydration improved  id/ pulm eval noted  ct head neg  mental status improved  a fib  dose coumadin   neuro eval noted     cont outpt  meds  pt  d/c planning in am
pt w/ sepsis   pna  viral syndrome  abs as per id  to finish today   iv fluid/ dehydration improved  id/ pulm eval noted  ct head neg  mental status improved  a fib  hold coumadin for inc inr  neuro eval noted     cont outpt  meds  pt  d/c planning if inr lower this am
pt w/ sepsis   pna  viral syndrome  abs as per id  toterminate tomorrow  iv fluid/ dehydration improved  id/ pulm eval noted  ct head neg  mental status improved  a fib  hold coumadin for inc inr  neuro eval noted     cont outpt  meds  pt  d/c planning in next 24 hours
pt w/ sepsis   pna  viral syndrome  s/p abs   iv fluid/ dehydration improved  id/ pulm eval noted  ct head neg  mental status improved  a fib  dose coumadin   neuro eval noted     cont outpt  meds  pt  d/c planning
pt w/ sepsis   pna  viral syndrome  s/p abs   iv fluid/ dehydration improved  id/ pulm eval noted  ct head neg  mental status improved  a fib  dose coumadin   neuro eval noted     cont outpt  meds  pt  d/c planning if cleared by pulm
88 y/o F with PMH of CVA (no residual defecit), HTN, Afib (on coumadin), psychotic depression, wheelchair bound following fall last year, frequent UTI, baseline mental status oriented x3 per family presents with cough, fever x2 days then became confused then more somnolent today. Started on doxycycline per her PMD yesterday. Found to have +hMPV. In ER patient obtunded, minimally responsive. VBG with normal pH, no evidence of CO2 narcosis. CT chest with RUL, RLL infiltrates concerning for superimposed aspiration PNA

## 2019-03-09 NOTE — PROGRESS NOTE ADULT - SUBJECTIVE AND OBJECTIVE BOX
Follow-up Pulm Progress Note    No new respiratory events overnight.   Weak cough, very junky sounding but non productive  97% on 1L NC  87% on RA      Medications:  MEDICATIONS  (STANDING):  ALBUTerol/ipratropium for Nebulization 3 milliLiter(s) Nebulizer every 6 hours  cefTRIAXone   IVPB      cefTRIAXone   IVPB 1 Gram(s) IV Intermittent every 24 hours  dextrose 5% + sodium chloride 0.9%. 1000 milliLiter(s) (30 mL/Hr) IV Continuous <Continuous>  guaiFENesin ER 1200 milliGRAM(s) Oral every 12 hours  losartan 50 milliGRAM(s) Oral daily  OLANZapine 10 milliGRAM(s) Oral at bedtime  sertraline 100 milliGRAM(s) Oral daily  sodium chloride 3%  Inhalation 3 milliLiter(s) Inhalation every 6 hours    MEDICATIONS  (PRN):  ALPRAZolam 0.25 milliGRAM(s) Oral every 12 hours PRN Anxiety          Vital Signs Last 24 Hrs  T(C): 36.6 (07 Mar 2019 05:54), Max: 36.9 (06 Mar 2019 20:26)  T(F): 97.8 (07 Mar 2019 05:54), Max: 98.4 (06 Mar 2019 20:26)  HR: 78 (07 Mar 2019 05:54) (78 - 97)  BP: 130/87 (07 Mar 2019 05:54) (127/71 - 149/81)  BP(mean): --  RR: 18 (07 Mar 2019 05:54) (18 - 18)  SpO2: 97% (07 Mar 2019 05:54) (95% - 100%) on 1L NC          03-06 @ 07:01  -  03-07 @ 07:00  --------------------------------------------------------  IN: 955 mL / OUT: 0 mL / NET: 955 mL          LABS:                CAPILLARY BLOOD GLUCOSE        PT/INR - ( 07 Mar 2019 09:21 )   PT: 25.6 sec;   INR: 2.21 ratio                             CULTURES: (if applicable)  Culture Results:   <10,000 CFU/mL Normal Urogenital Helen (03-01 @ 17:28)  Culture Results:   No growth at 5 days. (03-01 @ 15:08)  Culture Results:   No growth at 5 days. (03-01 @ 15:08)          Physical Examination:  PULM: Rhonchi/wheeze bilaterally   CVS: S1, S2 heard    RADIOLOGY REVIEWED  CT chest: < from: CT Chest No Cont (03.01.19 @ 16:46) >  CHEST:     LUNGS AND LARGE AIRWAYS: Limited evaluation secondary to motion artifact.   Patent central airways. Multiple nodular and hazy opacities in the   dependent portions of the right lung lobes.    PLEURA: No pleural effusion.    VESSELS: Atherosclerotic changes.    HEART: Heart size is normal. No pericardial effusion. Coronary artery   calcifications.    MEDIASTINUM AND REN: A 2.0 x 1.2 cm right lower paratracheal lymph node   (4:368).    CHEST WALL AND LOWER NECK: Within normal limits.    VISUALIZED UPPER ABDOMEN: Tiny hiatal hernia.    BONES: Degenerative changes. Mild loss of height of the L1 vertebral   body, new since 9/13/2013, likely chronic in nature.    IMPRESSION:  Multiple nodular and hazy opacities in the dependent portions of the   right lung lobes, which likely is secondary to infection.    < end of copied text >
Admitting Diagnosis:  Pneumonia (J18.9): PNEUMONIA, UNSPECIFIED ORGANISM      HPI:  This is a 87y year old Female with hx of CVA , HTN, Afib (on coumadin), psychotic depression, wheelchair bound following fall last year, frequent UTI, baseline mental status oriented x3 per family presents with cough, fever x2 days then became confused then more lethargic today.  Pt found to have +hMPV being treated with additional abx for possible pna.  recently wtih cough syrup which was proposed could have interacted with her psych meds.  Pt was well until her husbands death about 6 years ago.  Has been better on current zoloft and zyprexa regimen    aide at bedside says she is at her baseline       Past Medical History:  IBS (irritable bowel syndrome) (564.1)  Gastritis (535.50)  Atrial fibrillation (427.31)  CVA (cerebral infarction) (434.91)  Celiac disease (579.0)  Smoking (305.1)  Hypertension (401.9)      Past Surgical History:  No significant past surgical history ([id number])      Social History:  No toxic habits    Family History:  FAMILY HISTORY:      Allergies:  Levaquin (Faint)  Lorabid (Faint)  penicillin (Faint)  sulfa drugs (Unknown)      ROS:  Constitutional: Patient offers no complaints of fevers or significant weight loss  Ears, Nose, Mouth and Throat: The patient presents with no abnormalities of the head, ears, eyes, nose or throat  Skin: Patient offers no concerns of new rashes or lesions  Respiratory: The patient presents with no abnormalities of the respiratory tract  Cardiovascular: The patient presents with no cardiac abnormalities  Gastrointestinal: The patient presents with no abnormalities of the GI system  Genitourinary: The patient presents with no dysuria, hematuria or frequent urination  Neurological: See HPI  Endocrine: Patient offers no complaints of excessive thirst, urination, or heat/cold intolerance    Advanced care planning reviewed and noted in the chart.      Medications:  ALBUTerol/ipratropium for Nebulization 3 milliLiter(s) Nebulizer every 6 hours  cefTRIAXone   IVPB      cefTRIAXone   IVPB 1 Gram(s) IV Intermittent every 24 hours  dextrose 5% + sodium chloride 0.9%. 1000 milliLiter(s) IV Continuous <Continuous>  losartan 50 milliGRAM(s) Oral daily  methylPREDNISolone sodium succinate Injectable 20 milliGRAM(s) IV Push every 8 hours  OLANZapine 10 milliGRAM(s) Oral at bedtime  sertraline 100 milliGRAM(s) Oral daily      Labs:  CBC Full  -  ( 04 Mar 2019 08:37 )  WBC Count : 3.03 K/uL  Hemoglobin : 10.3 g/dL  Hematocrit : 32.9 %  Platelet Count - Automated : 144 K/uL  Mean Cell Volume : 91.4 fl  Mean Cell Hemoglobin : 28.6 pg  Mean Cell Hemoglobin Concentration : 31.3 gm/dL  Auto Neutrophil # : x  Auto Lymphocyte # : x  Auto Monocyte # : x  Auto Eosinophil # : x  Auto Basophil # : x  Auto Neutrophil % : x  Auto Lymphocyte % : x  Auto Monocyte % : x  Auto Eosinophil % : x  Auto Basophil % : x    03-04    145  |  107  |  19  ----------------------------<  148<H>  3.9   |  22  |  0.77    Ca    8.8      04 Mar 2019 06:55      CAPILLARY BLOOD GLUCOSE          PT/INR - ( 03 Mar 2019 09:51 )   PT: 26.4 sec;   INR: 2.24 ratio                 Vitals:  Vital Signs Last 24 Hrs  T(C): 36.3 (04 Mar 2019 05:58), Max: 36.6 (04 Mar 2019 00:46)  T(F): 97.3 (04 Mar 2019 05:58), Max: 97.8 (04 Mar 2019 00:46)  HR: 94 (04 Mar 2019 05:58) (86 - 98)  BP: 157/88 (04 Mar 2019 05:58) (111/55 - 157/88)  BP(mean): --  RR: 18 (04 Mar 2019 05:58) (18 - 18)  SpO2: 99% (04 Mar 2019 05:58) (95% - 99%)    NEUROLOGICAL EXAM:    Mental status: Awake, alert, and in no apparent distress. Oriented to person, said she lives at home with her boyfriend. follows commands    decreased facies    Cranial Nerves: Pupils were equal, round, reactive to light. Extraocular movements were intact. Visual field were full. Facial sensation was intact to light touch. There was no facial asymmetry. The palate was upgoing symmetrically and tongue was midline. Hearing acuity was intact to finger rub AU. Shoulder shrug was full bilaterally.  some chin tremor noted    Motor exam: mild cogwheeling. Strength was 5/5 in all four extremities. Fine finger movements were symmetric and normal. There was no pronator drift    Reflexes: 2+ in the bilateral upper extremities. 2+ in the bilateral lower extremities. Toes were downgoing bilaterally.     Sensation: Intact to light touch, temperature, vibration and proprioception.     Coordination: Finger-nose-finger and heel-to-shin was without dysmetria.     Gait: deferred     Imaging:      EXAM:  CT BRAIN                            PROCEDURE DATE:  03/01/2019            INTERPRETATION:  HISTORY: change in AMSADMDIAG1: J18.9 PNEUMONIA,   UNSPECIFIED ORGANISM/Minimally respnsive at this time    Technique: Noncontrast CT of the head wasperformed.    Multiple contiguous axial images were acquired from the skull base to the   vertex without the administration of intravenous contrast. Coronal and   sagittal reformations were made.    COMPARISON: CT head dated 6/4/2018, 8/15/2017, 1/5/2013. MR brain dated   6/5/2018..    FINDINGS:  There has been no interval change.    Mild prominence of the sulci and ventricles are consistent with moderate   volume loss. There are hemispheric white matter areas of low attenuation   which are nonspecific but likely related to sequelae of severe severity   microvascular disease. There is no intraparenchymal hematoma, mass effect   or midline shift. No abnormal extra-axial fluid collections are present.   There is no evidence of acute transcortical territorial infarction.    The calvarium is intact. The visualized intraorbital compartments,   paranasal sinuses and tympanomastoid cavities appear free of acute   disease.    IMPRESSION:  No interval change.  Severe severity microvascular ischemic change. No acute intracranial   hemorrhage.                    DANIEL GRADY M.D., ATTENDING RADIOLOGIST  This document has been electronically signed. Mar  1 2019  7:06PM
CHIEF COMPLAINT:Patient is a 87y old  Female who presents with a chief complaint of AMS (01 Mar 2019 17:27)    	        PAST MEDICAL & SURGICAL HISTORY:  IBS (irritable bowel syndrome)  Gastritis  Atrial fibrillation  CVA (cerebral infarction)  Smoking  Hypertension  No significant past surgical history          REVIEW OF SYSTEMS:  mental status at baseline  no cp   breathing    better  no vomiting    no diarrhea    aid at bedside     Medications:  MEDICATIONS  (STANDING):  ALBUTerol/ipratropium for Nebulization 3 milliLiter(s) Nebulizer every 6 hours  cefTRIAXone   IVPB      cefTRIAXone   IVPB 1 Gram(s) IV Intermittent every 24 hours  dextrose 5% + sodium chloride 0.9%. 1000 milliLiter(s) (70 mL/Hr) IV Continuous <Continuous>  losartan 50 milliGRAM(s) Oral daily  OLANZapine 10 milliGRAM(s) Oral at bedtime  potassium chloride    Tablet ER 20 milliEquivalent(s) Oral once  sertraline 100 milliGRAM(s) Oral daily    MEDICATIONS  (PRN):    	    PHYSICAL EXAM:  T(C): 36.4 (03-03-19 @ 08:41), Max: 37.6 (03-02-19 @ 20:57)  HR: 89 (03-03-19 @ 08:41) (84 - 101)  BP: 138/85 (03-03-19 @ 08:41) (113/64 - 157/83)  RR: 18 (03-03-19 @ 08:41) (18 - 18)  SpO2: 99% (03-03-19 @ 08:41) (95% - 99%)  Wt(kg): --  I&O's Summary    02 Mar 2019 07:01  -  03 Mar 2019 07:00  --------------------------------------------------------  IN: 960 mL / OUT: 0 mL / NET: 960 mL      	  HEENT:   Normal oral mucosa,  Lymphatic: No lymphadenopathy  Cardiovascular: Normal S1 S2, No JVD, No murmurs, No edema  Respiratory: dec bs   Gastrointestinal:  Soft, Non-tender, + BS	  Skin: No rashes, No ecchymoses, No cyanosis	  Neurologic: Non-focal/unable to fully assess  Extremities: dec  range of motion, No clubbing, cyanosis   Vascular: Peripheral pulses palpable     TELEMETRY: 	    ECG:  	  RADIOLOGY:  OTHER: 	  	  LABS:	 	    CARDIAC MARKERS:                                11.5   4.9   )-----------( 112      ( 01 Mar 2019 18:07 )             33.5     03-03    144  |  108  |  21  ----------------------------<  89  3.4<L>   |  23  |  1.01    Ca    8.8      03 Mar 2019 06:09  Phos  3.7     03-01  Mg     1.9     03-01    TPro  7.4  /  Alb  3.6  /  TBili  0.5  /  DBili  x   /  AST  50<H>  /  ALT  14  /  AlkPhos  78  03-01    proBNP:   Lipid Profile:   HgA1c:   TSH:
CHIEF COMPLAINT:Patient is a 87y old  Female who presents with a chief complaint of AMS (01 Mar 2019 17:27)    	        PAST MEDICAL & SURGICAL HISTORY:  IBS (irritable bowel syndrome)  Gastritis  Atrial fibrillation  CVA (cerebral infarction)  Smoking  Hypertension  No significant past surgical history          REVIEW OF SYSTEMS:  more awake  alert  responds to questions  no cp or sob  no vomiting      Medications:  MEDICATIONS  (STANDING):  ALBUTerol/ipratropium for Nebulization 3 milliLiter(s) Nebulizer every 6 hours  cefTRIAXone   IVPB      cefTRIAXone   IVPB 1 Gram(s) IV Intermittent every 24 hours  dextrose 5% + sodium chloride 0.9%. 1000 milliLiter(s) (70 mL/Hr) IV Continuous <Continuous>  losartan 50 milliGRAM(s) Oral daily  OLANZapine 10 milliGRAM(s) Oral at bedtime  sertraline 100 milliGRAM(s) Oral daily    MEDICATIONS  (PRN):    	    PHYSICAL EXAM:  T(C): 37.1 (03-02-19 @ 07:45), Max: 38.4 (03-01-19 @ 12:05)  HR: 98 (03-02-19 @ 07:45) (75 - 101)  BP: 137/92 (03-02-19 @ 07:45) (116/63 - 165/99)  RR: 18 (03-02-19 @ 07:45) (16 - 20)  SpO2: 98% (03-02-19 @ 07:45) (89% - 100%)  Wt(kg): --  I&O's Summary    01 Mar 2019 07:01  -  02 Mar 2019 07:00  --------------------------------------------------------  IN: 70 mL / OUT: 0 mL / NET: 70 mL    02 Mar 2019 07:01  -  02 Mar 2019 11:27  --------------------------------------------------------  IN: 240 mL / OUT: 0 mL / NET: 240 mL        Appearance: Normal	  HEENT:   Normal oral mucosa, PERRL,   Lymphatic: No lymphadenopathy  Cardiovascular: Normal S1 S2, No JVD, No murmurs,  Respiratory: few ronchi b/l  Gastrointestinal:  Soft, Non-tender, + BS	  Skin: No rashes, No ecchymoses, No cyanosis	  Neurologic: Non-focal  Extremities:dec range of motion, No clubbing, cyanosis   Vascular: Peripheral pulses palpable     TELEMETRY: 	    ECG:  	  RADIOLOGY:  OTHER: 	  	  LABS:	 	    CARDIAC MARKERS:                                11.5   4.9   )-----------( 112      ( 01 Mar 2019 18:07 )             33.5     03-01    141  |  104  |  26<H>  ----------------------------<  100<H>  5.0   |  24  |  1.16    Ca    9.1      01 Mar 2019 18:07  Phos  3.7     03-01  Mg     1.9     03-01    TPro  7.4  /  Alb  3.6  /  TBili  0.5  /  DBili  x   /  AST  50<H>  /  ALT  14  /  AlkPhos  78  03-01    proBNP:   Lipid Profile:   HgA1c:   TSH: Thyroid Stimulating Hormone, Serum: 2.03 uIU/mL (03-02 @ 05:25)
CHIEF COMPLAINT:Patient is a 87y old  Female who presents with a chief complaint of Cough, fever, confusion and  lethargy. (07 Mar 2019 14:25)    	        PAST MEDICAL & SURGICAL HISTORY:  IBS (irritable bowel syndrome)  Gastritis  Atrial fibrillation  CVA (cerebral infarction)  Smoking  Hypertension  No significant past surgical history          REVIEW OF SYSTEMS:  CONSTITUTIONAL: overall better   EYES: No eye pain, visual disturbances, or discharge  NECK: No pain or stiffness  RESPIRATORY: dec  cough  no  hemoptysis; No Shortness of Breath  CARDIOVASCULAR: No chest pain, palpitations, passing out, dizziness,   GASTROINTESTINAL: No abdominal or epigastric pain. No nausea, vomiting, or hematemesis; No diarrhea or constipation. No melena or hematochezia.  GENITOURINARY: No dysuria, frequency, hematuria, or incontinence  NEUROLOGICAL: No headaches,     Medications:  MEDICATIONS  (STANDING):  ALBUTerol/ipratropium for Nebulization 3 milliLiter(s) Nebulizer every 6 hours  cefTRIAXone   IVPB      cefTRIAXone   IVPB 1 Gram(s) IV Intermittent every 24 hours  dextrose 5% + sodium chloride 0.9%. 1000 milliLiter(s) (30 mL/Hr) IV Continuous <Continuous>  guaiFENesin ER 1200 milliGRAM(s) Oral every 12 hours  losartan 50 milliGRAM(s) Oral daily  OLANZapine 10 milliGRAM(s) Oral at bedtime  sertraline 100 milliGRAM(s) Oral daily  sodium chloride 3%  Inhalation 3 milliLiter(s) Inhalation every 6 hours    MEDICATIONS  (PRN):  ALPRAZolam 0.25 milliGRAM(s) Oral every 12 hours PRN Anxiety    	    PHYSICAL EXAM:  T(C): 36.3 (03-07-19 @ 13:55), Max: 36.9 (03-06-19 @ 20:26)  HR: 98 (03-07-19 @ 13:55) (78 - 98)  BP: 160/82 (03-07-19 @ 13:55) (127/71 - 160/82)  RR: 18 (03-07-19 @ 13:55) (18 - 18)  SpO2: 97% (03-07-19 @ 13:55) (95% - 100%)  Wt(kg): --  I&O's Summary    06 Mar 2019 07:01  -  07 Mar 2019 07:00  --------------------------------------------------------  IN: 955 mL / OUT: 0 mL / NET: 955 mL        Appearance: Normal	  HEENT:   Normal oral mucosa, PERRL, EOMI	  Lymphatic: No lymphadenopathy  Cardiovascular: Normal S1 S2, No JVD, No murmurs, No edema  Respiratory:few ronchi	  Psychiatry: A & O  Gastrointestinal:  Soft, Non-tender, + BS	  Skin: No rashes, No ecchymoses, No cyanosis	  Neurologic: Non-focal  Extremities: dec range of motion, No clubbing, cyanosis or edema  Vascular: Peripheral pulses palpable    TELEMETRY: 	    ECG:  	  RADIOLOGY:  OTHER: 	  	  LABS:	 	    CARDIAC MARKERS:                  proBNP:   Lipid Profile:   HgA1c:   TSH:
CHIEF COMPLAINT:Patient is a 87y old  Female who presents with a chief complaint of Cough, fever, confusion and  lethargy. (07 Mar 2019 14:25)    	        PAST MEDICAL & SURGICAL HISTORY:  IBS (irritable bowel syndrome)  Gastritis  Atrial fibrillation  CVA (cerebral infarction)  Smoking  Hypertension  No significant past surgical history          REVIEW OF SYSTEMS:  CONSTITUTIONAL:at baseline   EYES: No eye pain, visual disturbances, or discharge  NECK: No pain or stiffness  RESPIRATORY: dec cough wheezing, chills or hemoptysis; No Shortness of Breath  CARDIOVASCULAR: No chest pain, palpitations, passing out, dizziness,  GASTROINTESTINAL: No abdominal or epigastric pain. No nausea, vomiting, or hematemesis; No diarrhea or constipation. No melena or hematochezia.  GENITOURINARY: No dysuria, frequency, hematuria, or incontinence  NEUROLOGICAL: No headaches,       Medications:  MEDICATIONS  (STANDING):  ALBUTerol/ipratropium for Nebulization 3 milliLiter(s) Nebulizer every 6 hours  cefTRIAXone   IVPB      cefTRIAXone   IVPB 1 Gram(s) IV Intermittent every 24 hours  guaiFENesin ER 1200 milliGRAM(s) Oral every 12 hours  losartan 50 milliGRAM(s) Oral daily  OLANZapine 10 milliGRAM(s) Oral at bedtime  sertraline 100 milliGRAM(s) Oral daily  sodium chloride 3%  Inhalation 3 milliLiter(s) Inhalation every 6 hours    MEDICATIONS  (PRN):  ALPRAZolam 0.25 milliGRAM(s) Oral every 12 hours PRN Anxiety    	    PHYSICAL EXAM:  T(C): 37.1 (03-08-19 @ 08:24), Max: 37.1 (03-08-19 @ 08:24)  HR: 96 (03-08-19 @ 08:24) (88 - 100)  BP: 151/87 (03-08-19 @ 08:24) (106/70 - 160/82)  RR: 18 (03-08-19 @ 08:24) (18 - 18)  SpO2: 93% (03-08-19 @ 08:24) (76% - 97%)  Wt(kg): --  I&O's Summary    07 Mar 2019 07:01  -  08 Mar 2019 07:00  --------------------------------------------------------  IN: 530 mL / OUT: 0 mL / NET: 530 mL        Appearance: Normal	  HEENT:   Normal oral mucosa, PERRL, EOMI	  Lymphatic: No lymphadenopathy  Cardiovascular: Normal S1 S2, No JVD, No murmurs, No edema  Respiratory: dec bs   Gastrointestinal:  Soft, Non-tender, + BS	  Skin: No rashes, No ecchymoses, No cyanosis	  Neurologic: Non-focal  Extremities: dec range of motion, No clubbing, cyanosis or edema  Vascular: Peripheral pulses palpable    TELEMETRY: 	    ECG:  	  RADIOLOGY:  OTHER: 	  	  LABS:	 	    CARDIAC MARKERS:                                11.1   6.2   )-----------( 199      ( 08 Mar 2019 06:53 )             31.3           proBNP:   Lipid Profile:   HgA1c:   TSH:
CHIEF COMPLAINT:Patient is a 87y old  Female who presents with a chief complaint of Cough, fever, confusion and  lethargy. (07 Mar 2019 14:25)    	        PAST MEDICAL & SURGICAL HISTORY:  IBS (irritable bowel syndrome)  Gastritis  Atrial fibrillation  CVA (cerebral infarction)  Smoking  Hypertension  No significant past surgical history          REVIEW OF SYSTEMS:  EYES: No eye pain, visual disturbances, or discharge  NECK: No pain or stiffness  RESPIRATORY:cough / breathing improved   CARDIOVASCULAR: No chest pain, palpitations, passing out, dizziness, or leg swelling  GASTROINTESTINAL: No abdominal or epigastric pain. No nausea, vomiting, or hematemesis; No diarrhea or constipation. No melena or hematochezia.  GENITOURINARY: No dysuria, frequency, hematuria, or incontinence  NEUROLOGICAL: No headaches,     Medications:  MEDICATIONS  (STANDING):  ALBUTerol/ipratropium for Nebulization 3 milliLiter(s) Nebulizer every 6 hours  guaiFENesin ER 1200 milliGRAM(s) Oral every 12 hours  losartan 50 milliGRAM(s) Oral daily  OLANZapine 10 milliGRAM(s) Oral at bedtime  sertraline 100 milliGRAM(s) Oral daily  sodium chloride 3%  Inhalation 3 milliLiter(s) Inhalation every 6 hours    MEDICATIONS  (PRN):  ALPRAZolam 0.25 milliGRAM(s) Oral every 12 hours PRN Anxiety    	    PHYSICAL EXAM:  T(C): 36.3 (03-09-19 @ 06:55), Max: 36.7 (03-08-19 @ 20:30)  HR: 90 (03-09-19 @ 06:55) (80 - 101)  BP: 136/95 (03-09-19 @ 06:55) (135/82 - 155/86)  RR: 19 (03-09-19 @ 06:55) (18 - 19)  SpO2: 93% (03-09-19 @ 06:55) (85% - 97%)  Wt(kg): --  I&O's Summary    08 Mar 2019 07:01  -  09 Mar 2019 07:00  --------------------------------------------------------  IN: 600 mL / OUT: 0 mL / NET: 600 mL        Appearance: Normal	  HEENT:   Normal oral mucosa, PERRL, EOMI	  Lymphatic: No lymphadenopathy  Cardiovascular: Normal S1 S2, No JVD, No murmurs, No edema  Respiratory: dec bs   Gastrointestinal:  Soft, Non-tender, + BS	  Skin: No rashes, No ecchymoses, No cyanosis	  Neurologic: Non-focal  Extremities: Normal range of motion, No clubbing, cyanosis or edema  Vascular: Peripheral pulses palpable    TELEMETRY: 	    ECG:  	  RADIOLOGY:  OTHER: 	  	  LABS:	 	    CARDIAC MARKERS:                                11.1   6.2   )-----------( 199      ( 08 Mar 2019 06:53 )             31.3     03-09    143  |  106  |  22  ----------------------------<  91  4.0   |  25  |  1.09    Ca    8.8      09 Mar 2019 07:06      proBNP:   Lipid Profile:   HgA1c:   TSH:
CHIEF COMPLAINT:Patient is a 87y old  Female who presents with a chief complaint of viral  infection (03 Mar 2019 11:30)    	        PAST MEDICAL & SURGICAL HISTORY:  IBS (irritable bowel syndrome)  Gastritis  Atrial fibrillation  CVA (cerebral infarction)  Smoking  Hypertension  No significant past surgical history          REVIEW OF SYSTEM    mental status at baseline  no cp or sob  no n/v  no chills       Medications:  MEDICATIONS  (STANDING):  ALBUTerol/ipratropium for Nebulization 3 milliLiter(s) Nebulizer every 6 hours  cefTRIAXone   IVPB      cefTRIAXone   IVPB 1 Gram(s) IV Intermittent every 24 hours  dextrose 5% + sodium chloride 0.9%. 1000 milliLiter(s) (50 mL/Hr) IV Continuous <Continuous>  guaiFENesin ER 1200 milliGRAM(s) Oral every 12 hours  losartan 50 milliGRAM(s) Oral daily  OLANZapine 10 milliGRAM(s) Oral at bedtime  sertraline 100 milliGRAM(s) Oral daily    MEDICATIONS  (PRN):  ALPRAZolam 0.25 milliGRAM(s) Oral every 12 hours PRN Anxiety    	    PHYSICAL EXAM:  T(C): 36.3 (03-06-19 @ 12:13), Max: 36.4 (03-06-19 @ 06:20)  HR: 91 (03-06-19 @ 12:13) (83 - 96)  BP: 152/79 (03-06-19 @ 12:13) (152/79 - 180/100)  RR: 18 (03-06-19 @ 12:13) (18 - 19)  SpO2: 97% (03-06-19 @ 12:13) (88% - 97%)  Wt(kg): --  I&O's Summary    05 Mar 2019 07:01  -  06 Mar 2019 07:00  --------------------------------------------------------  IN: 740 mL / OUT: 0 mL / NET: 740 mL    06 Mar 2019 07:01  -  06 Mar 2019 14:16  --------------------------------------------------------  IN: 200 mL / OUT: 0 mL / NET: 200 mL        Appearance: Normal	  HEENT:   Normal oral mucosa, PERRL, EOMI	  Lymphatic: No lymphadenopathy  dec bs   Cardiovascular: Normal S1 S2, No JVD, No murmurs,   Gastrointestinal:  Soft, Non-tender, + BS	  Skin: No rashes, No ecchymoses, No cyanosis	  Neurologic: Non-focal  Extremities: dec  range of motion, No clubbing, cyanosis or edema  Vascular: Peripheral pulses palpable     TELEMETRY: 	    ECG:  	  RADIOLOGY:  OTHER: 	  	  LABS:	 	    CARDIAC MARKERS:                                11.3   8.33  )-----------( 194      ( 05 Mar 2019 09:33 )             36.0           proBNP:   Lipid Profile:   HgA1c:   TSH:
CHIEF COMPLAINT:Patient is a 87y old  Female who presents with a chief complaint of viral  infection (03 Mar 2019 11:30)    	        PAST MEDICAL & SURGICAL HISTORY:  IBS (irritable bowel syndrome)  Gastritis  Atrial fibrillation  CVA (cerebral infarction)  Smoking  Hypertension  No significant past surgical history          REVIEW OF SYSTEMS:  CONSTITUTIONAL: feels better   EYES: No eye pain, visual disturbances, or discharge  NECK: No pain or stiffness  RESPIRATORY: dec cough and sob  CARDIOVASCULAR: No chest pain, palpitations, passing out, dizziness, or leg swelling  GASTROINTESTINAL: No abdominal or epigastric pain. No nausea, vomiting, or hematemesis; No diarrhea or constipation. No melena or hematochezia.  GENITOURINARY: No dysuria, frequency, hematuria, or incontinence  NEUROLOGICAL: No headaches,  Medications:  MEDICATIONS  (STANDING):  ALBUTerol/ipratropium for Nebulization 3 milliLiter(s) Nebulizer every 6 hours  cefTRIAXone   IVPB      cefTRIAXone   IVPB 1 Gram(s) IV Intermittent every 24 hours  dextrose 5% + sodium chloride 0.9%. 1000 milliLiter(s) (50 mL/Hr) IV Continuous <Continuous>  guaiFENesin ER 1200 milliGRAM(s) Oral every 12 hours  losartan 50 milliGRAM(s) Oral daily  OLANZapine 10 milliGRAM(s) Oral at bedtime  predniSONE   Tablet 40 milliGRAM(s) Oral daily  sertraline 100 milliGRAM(s) Oral daily    MEDICATIONS  (PRN):  ALPRAZolam 0.25 milliGRAM(s) Oral every 12 hours PRN Anxiety    	    PHYSICAL EXAM:  T(C): 36.4 (03-05-19 @ 06:13), Max: 36.6 (03-04-19 @ 12:35)  HR: 84 (03-05-19 @ 06:13) (84 - 104)  BP: 175/96 (03-05-19 @ 06:13) (157/83 - 175/96)  RR: 17 (03-05-19 @ 06:13) (17 - 18)  SpO2: 97% (03-05-19 @ 06:13) (94% - 100%)  Wt(kg): --  I&O's Summary    04 Mar 2019 07:01  -  05 Mar 2019 07:00  --------------------------------------------------------  IN: 1490 mL / OUT: 0 mL / NET: 1490 mL    05 Mar 2019 07:01  -  05 Mar 2019 11:25  --------------------------------------------------------  IN: 180 mL / OUT: 0 mL / NET: 180 mL        Appearance: Normal	  HEENT:   Normal oral mucosa, PERRL, EOMI	  Lymphatic: No lymphadenopathy  Cardiovascular: Normal S1 S2, No JVD, No murmurs, No edema  Respiratory:dec bs  Psychiatry: A & O   Gastrointestinal:  Soft, Non-tender, + BS	  Skin: No rashes, No ecchymoses, No cyanosis	  Neurologic: Non-focal  Extremities: dec range of motion, No clubbing, cyanosis   Vascular: Peripheral pulses palpable 2+ bilaterally    TELEMETRY: 	    ECG:  	  RADIOLOGY:  OTHER: 	  	  LABS:	 	    CARDIAC MARKERS:                                11.3   8.33  )-----------( 194      ( 05 Mar 2019 09:33 )             36.0     03-04    145  |  107  |  19  ----------------------------<  148<H>  3.9   |  22  |  0.77    Ca    8.8      04 Mar 2019 06:55      proBNP:   Lipid Profile:   HgA1c:   TSH:
CHIEF COMPLAINT:Patient is a 87y old  Female who presents with a chief complaint of viral  infection (03 Mar 2019 11:30)    	        PAST MEDICAL & SURGICAL HISTORY:  IBS (irritable bowel syndrome)  Gastritis  Atrial fibrillation  CVA (cerebral infarction)  Smoking  Hypertension  No significant past surgical history          REVIEW OF SYSTEMS:  CONSTITUTIONAL: feels better  EYES: No eye pain, visual disturbances, or discharge  NECK: No pain or stiffness  RESPIRATORY: dec cough andsob  CARDIOVASCULAR: No chest pain, palpitations, passing out, dizziness, or leg swelling  GASTROINTESTINAL: No abdominal or epigastric pain. No nausea, vomiting, or hematemesis; No diarrhea or constipation. No melena or hematochezia.  GENITOURINARY: No dysuria, frequency, hematuria, or incontinence  NEUROLOGICAL: No headaches,  MUSCULOSKELETAL: No joint pain or swelling; No muscle, back, or extremity pain    Medications:  MEDICATIONS  (STANDING):  ALBUTerol/ipratropium for Nebulization 3 milliLiter(s) Nebulizer every 6 hours  cefTRIAXone   IVPB      cefTRIAXone   IVPB 1 Gram(s) IV Intermittent every 24 hours  dextrose 5% + sodium chloride 0.9%. 1000 milliLiter(s) (30 mL/Hr) IV Continuous <Continuous>  guaiFENesin ER 1200 milliGRAM(s) Oral every 12 hours  losartan 50 milliGRAM(s) Oral daily  OLANZapine 10 milliGRAM(s) Oral at bedtime  sertraline 100 milliGRAM(s) Oral daily    MEDICATIONS  (PRN):  ALPRAZolam 0.25 milliGRAM(s) Oral every 12 hours PRN Anxiety    	    PHYSICAL EXAM:  T(C): 36.6 (03-07-19 @ 05:54), Max: 36.9 (03-06-19 @ 20:26)  HR: 78 (03-07-19 @ 05:54) (78 - 97)  BP: 130/87 (03-07-19 @ 05:54) (127/71 - 152/79)  RR: 18 (03-07-19 @ 05:54) (18 - 19)  SpO2: 97% (03-07-19 @ 05:54) (88% - 100%)  Wt(kg): --  I&O's Summary    06 Mar 2019 07:01  -  07 Mar 2019 07:00  --------------------------------------------------------  IN: 955 mL / OUT: 0 mL / NET: 955 mL        Appearance: Normal	  HEENT:   Normal oral mucosa, PERRL, EOMI	  Lymphatic: No lymphadenopathy  Cardiovascular: Normal S1 S2, No JVD, No murmurs, No edema  Respiratory: dec bs   Psychiatry: A & O x 3, Mood & affect appropriate  Gastrointestinal:  Soft, Non-tender, + BS	  Skin: No rashes, No ecchymoses, No cyanosis	  Neurologic: Non-focal  Extremities: dec range of motion, No clubbing, cyanosis or edema  Vascular: Peripheral pulses palpable     TELEMETRY: 	    ECG:  	  RADIOLOGY:  OTHER: 	  	  LABS:	 	    CARDIAC MARKERS:                                11.3   8.33  )-----------( 194      ( 05 Mar 2019 09:33 )             36.0           proBNP:   Lipid Profile:   HgA1c:   TSH:
CHIEF COMPLAINT:Patient is a 87y old  Female who presents with a chief complaint of viral  infection (03 Mar 2019 11:30)    	        PAST MEDICAL & SURGICAL HISTORY:  IBS (irritable bowel syndrome)  Gastritis  Atrial fibrillation  CVA (cerebral infarction)  Smoking  Hypertension  No significant past surgical history          REVIEW OF SYSTEMS:  awake / alert  ms at baseline  no cp or sob  no vomiting  no abd jonna   no chills      Medications:  MEDICATIONS  (STANDING):  ALBUTerol/ipratropium for Nebulization 3 milliLiter(s) Nebulizer every 6 hours  cefTRIAXone   IVPB      cefTRIAXone   IVPB 1 Gram(s) IV Intermittent every 24 hours  dextrose 5% + sodium chloride 0.9%. 1000 milliLiter(s) (50 mL/Hr) IV Continuous <Continuous>  losartan 50 milliGRAM(s) Oral daily  methylPREDNISolone sodium succinate Injectable 20 milliGRAM(s) IV Push every 8 hours  OLANZapine 10 milliGRAM(s) Oral at bedtime  sertraline 100 milliGRAM(s) Oral daily    MEDICATIONS  (PRN):    	    PHYSICAL EXAM:  T(C): 36.3 (03-04-19 @ 09:05), Max: 36.6 (03-04-19 @ 00:46)  HR: 100 (03-04-19 @ 09:05) (86 - 100)  BP: 159/80 (03-04-19 @ 09:05) (111/55 - 159/80)  RR: 18 (03-04-19 @ 09:05) (18 - 18)  SpO2: 94% (03-04-19 @ 09:05) (94% - 99%)  Wt(kg): --  I&O's Summary    03 Mar 2019 07:01  -  04 Mar 2019 07:00  --------------------------------------------------------  IN: 420 mL / OUT: 0 mL / NET: 420 mL    04 Mar 2019 07:01  -  04 Mar 2019 11:48  --------------------------------------------------------  IN: 390 mL / OUT: 0 mL / NET: 390 mL        Appearance: Normal	  HEENT:   Normal oral mucosa, PERRL, EOMI	  Lymphatic: No lymphadenopathy  Cardiovascular: Normal S1 S2, No JVD, No murmurs,   Respiratory: dec b s   Gastrointestinal:  Soft, Non-tender, + BS	  Skin: No rashes, No ecchymoses, No cyanosis	  Neurologic: Non-focal  Extremities  dec range of motion, No clubbing, cyanosis   Vascular: Peripheral pulses palpable     TELEMETRY: 	    ECG:  	  RADIOLOGY:  OTHER: 	  	  LABS:	 	    CARDIAC MARKERS:                                10.3   3.03  )-----------( 144      ( 04 Mar 2019 08:37 )             32.9     03-04    145  |  107  |  19  ----------------------------<  148<H>  3.9   |  22  |  0.77    Ca    8.8      04 Mar 2019 06:55      proBNP:   Lipid Profile:   HgA1c:   TSH:
Covering Dr Kearns  Patient is a 87y old  Female who presents with a chief complaint of AMS (01 Mar 2019 17:27)      Any change in ROS: she seems to be comfortable no resp distress: says her breathing is OK    MEDICATIONS  (STANDING):  ALBUTerol/ipratropium for Nebulization 3 milliLiter(s) Nebulizer every 6 hours  cefTRIAXone   IVPB      cefTRIAXone   IVPB 1 Gram(s) IV Intermittent every 24 hours  dextrose 5% + sodium chloride 0.9%. 1000 milliLiter(s) (70 mL/Hr) IV Continuous <Continuous>  losartan 50 milliGRAM(s) Oral daily  OLANZapine 10 milliGRAM(s) Oral at bedtime  sertraline 100 milliGRAM(s) Oral daily    MEDICATIONS  (PRN):    Vital Signs Last 24 Hrs  T(C): 37.1 (02 Mar 2019 07:45), Max: 38.4 (01 Mar 2019 12:05)  T(F): 98.7 (02 Mar 2019 07:45), Max: 101.2 (01 Mar 2019 12:05)  HR: 98 (02 Mar 2019 07:45) (75 - 101)  BP: 137/92 (02 Mar 2019 07:45) (116/63 - 159/78)  BP(mean): --  RR: 18 (02 Mar 2019 07:45) (16 - 20)  SpO2: 98% (02 Mar 2019 07:45) (89% - 100%)    I&O's Summary    01 Mar 2019 07:01  -  02 Mar 2019 07:00  --------------------------------------------------------  IN: 70 mL / OUT: 0 mL / NET: 70 mL    02 Mar 2019 07:01  -  02 Mar 2019 11:54  --------------------------------------------------------  IN: 240 mL / OUT: 0 mL / NET: 240 mL          Physical Exam:   GENERAL: NAD, well-groomed, well-developed  HEENT: DILAN/   Atraumatic, Normocephalic  ENMT: No tonsillar erythema, exudates, or enlargement; Moist mucous membranes, Good dentition, No lesions  NECK: Supple, No JVD, Normal thyroid  CHEST/LUNG: basilar crackles   CVS: Regular rate and rhythm; No murmurs, rubs, or gallops  GI: : Soft, Nontender, Nondistended; Bowel sounds present  NERVOUS SYSTEM:  Alert & Oriented X3  EXTREMITIES:-edema  LYMPH: No lymphadenopathy noted  SKIN: No rashes or lesions  ENDOCRINOLOGY: No Thyromegaly  PSYCH: Appropriate    Labs:  ABG - ( 01 Mar 2019 17:52 )  pH, Arterial: 7.41  pH, Blood: x     /  pCO2: 45    /  pO2: 54    / HCO3: 28    / Base Excess: 3.3   /  SaO2: 87              27                            11.5   4.9   )-----------( 112      ( 01 Mar 2019 18:07 )             33.5                         12.2   6.0   )-----------( 132      ( 01 Mar 2019 12:32 )             35.4         141  |  104  |  26<H>  ----------------------------<  100<H>  5.0   |  24  |  1.16      142  |  104  |  24<H>  ----------------------------<  120<H>  3.9   |  22  |  1.14    Ca    9.1      01 Mar 2019 18:07  Ca    9.4      01 Mar 2019 12:32  Phos  3.7       Mg     1.9         TPro  7.4  /  Alb  3.6  /  TBili  0.5  /  DBili  x   /  AST  50<H>  /  ALT  14  /  AlkPhos  78    TPro  7.5  /  Alb  3.8  /  TBili  0.5  /  DBili  x   /  AST  37  /  ALT  13  /  AlkPhos  81      CAPILLARY BLOOD GLUCOSE      POCT Blood Glucose.: 105 mg/dL (01 Mar 2019 17:36)  POCT Blood Glucose.: 106 mg/dL (01 Mar 2019 17:03)      LIVER FUNCTIONS - ( 01 Mar 2019 18:07 )  Alb: 3.6 g/dL / Pro: 7.4 g/dL / ALK PHOS: 78 U/L / ALT: 14 U/L / AST: 50 U/L / GGT: x           PT/INR - ( 01 Mar 2019 18:07 )   PT: 22.8 sec;   INR: 1.96 ratio         PTT - ( 01 Mar 2019 18:07 )  PTT:33.2 sec  Urinalysis Basic - ( 01 Mar 2019 13:03 )    Color: Yellow / Appearance: Slightly Turbid / S.026 / pH: x  Gluc: x / Ketone: Negative  / Bili: Negative / Urobili: Negative   Blood: x / Protein: 100 / Nitrite: Negative   Leuk Esterase: Negative / RBC: 4 /hpf / WBC 3 /HPF   Sq Epi: x / Non Sq Epi: 9 /hpf / Bacteria: Negative      < from: CT Chest No Cont (19 @ 16:46) >    CHEST:     LUNGS AND LARGE AIRWAYS: Limited evaluation secondary to motion artifact.   Patent central airways. Multiple nodular and hazy opacities in the   dependent portions of the right lung lobes.    PLEURA: No pleural effusion.    VESSELS: Atherosclerotic changes.    HEART: Heart size is normal. No pericardial effusion. Coronary artery   calcifications.    MEDIASTINUM AND REN: A 2.0 x 1.2 cm right lower paratracheal lymph node   (4:368).    CHEST WALL AND LOWER NECK: Within normal limits.    VISUALIZED UPPER ABDOMEN: Tiny hiatal hernia.    BONES: Degenerative changes. Mild loss of height of the L1 vertebral   body, new since 2013, likely chronic in nature.    IMPRESSION:  Multiple nodular and hazy opacities in the dependent portions of the   right lung lobes, which likely is secondary to infection.                    EUGENIO ALLAN M.D., RADIOLOGY RESIDENT  This document has been electronically signed.  TAMIKA MILLER M.D., ATTENDING RADIOLOGIST  This document has been electronically signed. Mar  1 2019  5:07PM        < end of copied text >        RECENT CULTURES:        RESPIRATORY CULTURES:          Studies  Chest X-RAY  CT SCAN Chest   Venous Dopplers: LE:   CT Abdomen  Others
Follow Up:  viral URI and pneumonia    Interval History: pt stable and afebrile,  speech and swallow showed aspirations    ROS:      All other systems negative    Constitutional: no fever, no chills  Head: no trauma  Eyes: no vision changes, no eye pain  ENT:  no sore throat, no rhinorrhea  Cardiovascular:  no chest pain, no palpitation  Respiratory:  no SOB, no cough  GI:  no abd pain, no vomiting, no diarrhea  urinary: no dysuria, no hematuria, no flank pain  musculoskeletal:  no joint pain, no joint swelling  skin:  no rash  neurology:  no headache, no seizure  psych: no anxiety, no depression         Allergies  Levaquin (Faint)  penicillin (Faint)  sulfa drugs (Unknown)        ANTIMICROBIALS:  cefTRIAXone   IVPB    cefTRIAXone   IVPB 1 every 24 hours      OTHER MEDS:  ALBUTerol/ipratropium for Nebulization 3 milliLiter(s) Nebulizer every 6 hours  ALPRAZolam 0.25 milliGRAM(s) Oral every 12 hours PRN  dextrose 5% + sodium chloride 0.9%. 1000 milliLiter(s) IV Continuous <Continuous>  guaiFENesin ER 1200 milliGRAM(s) Oral every 12 hours  losartan 50 milliGRAM(s) Oral daily  OLANZapine 10 milliGRAM(s) Oral at bedtime  sertraline 100 milliGRAM(s) Oral daily      Vital Signs Last 24 Hrs  T(C): 36.3 (06 Mar 2019 12:13), Max: 36.4 (06 Mar 2019 06:20)  T(F): 97.4 (06 Mar 2019 12:13), Max: 97.5 (06 Mar 2019 06:20)  HR: 91 (06 Mar 2019 12:13) (83 - 96)  BP: 152/79 (06 Mar 2019 12:13) (152/79 - 180/100)  BP(mean): --  RR: 18 (06 Mar 2019 12:13) (18 - 19)  SpO2: 97% (06 Mar 2019 12:13) (88% - 97%)    Physical Exam:  General:    NAD,  non toxic  Head: atraumatic, normocephalic  Eye: normal sclera and conjunctiva  ENT:    no oropharyngeal lesions,   no LAD,   neck supple  Cardio:     regular S1, S2,  no murmur  Respiratory:    clear b/l,    no wheezing  abd:     soft,   BS +,   no tenderness,    no organomegaly  :   no CVAT,  no suprapubic tenderness,   no  melchor  Musculoskeletal:   no joint swelling,   no edema  vascular: no lines, normal pulses  Skin:    no rash  Neurologic:    pt was sleepy but answered the questions  psych: normal affect                            11.3   8.33  )-----------( 194      ( 05 Mar 2019 09:33 )             36.0                   MICROBIOLOGY:  v  .Urine Clean Catch (Midstream)  03-01-19   <10,000 CFU/mL Normal Urogenital Helen  --  --      .Blood Blood-Peripheral  03-01-19   No growth to date.  --  --          Rapid RVP Result: Detected (03-01 @ 12:32)        RADIOLOGY:  Images below reviewed personally  < from: Xray Modified Barium Swallow (03.05.19 @ 10:51) >  IMPRESSION:       There is evidence of laryngeal penetration and aspiration with the tested   consistencies.    For further information and recommendations, please refer to the speech   pathologist final report which is available for review in the electronic   medical record.    < from: CT Chest No Cont (03.01.19 @ 16:46) >  IMPRESSION:  Multiple nodular and hazy opacities in the dependent portions of the   right lung lobes, which likely is secondary to infection.
Follow Up:  viral URI and pneumonia    Interval History: pt stable and afebrile, going for speech and swallow    ROS:      All other systems negative    Constitutional: no fever, no chills  Head: no trauma  Eyes: no vision changes, no eye pain  ENT:  no sore throat, no rhinorrhea  Cardiovascular:  no chest pain, no palpitation  Respiratory:  no SOB, no cough  GI:  no abd pain, no vomiting, no diarrhea  urinary: no dysuria, no hematuria, no flank pain  musculoskeletal:  no joint pain, no joint swelling  skin:  no rash  neurology:  no headache, no seizure  psych: no anxiety, no depression       Allergies  Levaquin (Faint)  penicillin (Faint)  sulfa drugs (Unknown)        ANTIMICROBIALS:  cefTRIAXone   IVPB    cefTRIAXone   IVPB 1 every 24 hours      OTHER MEDS:  ALBUTerol/ipratropium for Nebulization 3 milliLiter(s) Nebulizer every 6 hours  ALPRAZolam 0.25 milliGRAM(s) Oral every 12 hours PRN  dextrose 5% + sodium chloride 0.9%. 1000 milliLiter(s) IV Continuous <Continuous>  guaiFENesin ER 1200 milliGRAM(s) Oral every 12 hours  losartan 50 milliGRAM(s) Oral daily  OLANZapine 10 milliGRAM(s) Oral at bedtime  predniSONE   Tablet 40 milliGRAM(s) Oral daily  sertraline 100 milliGRAM(s) Oral daily      Vital Signs Last 24 Hrs  T(C): 36.4 (05 Mar 2019 06:13), Max: 36.6 (04 Mar 2019 12:35)  T(F): 97.5 (05 Mar 2019 06:13), Max: 97.9 (04 Mar 2019 12:35)  HR: 84 (05 Mar 2019 06:13) (84 - 104)  BP: 175/96 (05 Mar 2019 06:13) (157/83 - 175/96)  BP(mean): --  RR: 17 (05 Mar 2019 06:13) (17 - 18)  SpO2: 97% (05 Mar 2019 06:13) (94% - 100%)    Physical Exam:  General:    NAD,  non toxic  Head: atraumatic, normocephalic  Eye: normal sclera and conjunctiva  ENT:    no oropharyngeal lesions,   no LAD,   neck supple  Cardio:     regular S1, S2,  no murmur  Respiratory:    clear b/l,    no wheezing  abd:     soft,   BS +,   no tenderness,    no organomegaly  :   no CVAT,  no suprapubic tenderness,   no  melchor  Musculoskeletal:   no joint swelling,   no edema  vascular: no lines, normal pulses  Skin:    no rash  Neurologic:    pt was sleepy but answered the questions  psych: normal affect                          11.3   8.33  )-----------( 194      ( 05 Mar 2019 09:33 )             36.0       03-04    145  |  107  |  19  ----------------------------<  148<H>  3.9   |  22  |  0.77    Ca    8.8      04 Mar 2019 06:55            MICROBIOLOGY:  v  .Urine Clean Catch (Midstream)  03-01-19   <10,000 CFU/mL Normal Urogenital Helen  --  --      .Blood Blood-Peripheral  03-01-19   No growth to date.  --  --          Rapid RVP Result: Detected (03-01 @ 12:32)        RADIOLOGY:  Images below reviewed personally  < from: CT Chest No Cont (03.01.19 @ 16:46) >    IMPRESSION:  Multiple nodular and hazy opacities in the dependent portions of the   right lung lobes, which likely is secondary to infection.
Follow Up:  viral URI and pneumonia    Interval History: pt stable and afebrile, mental status improved    ROS:      All other systems negative    Constitutional: no fever, no chills  Head: no trauma  Eyes: no vision changes, no eye pain  ENT:  no sore throat, no rhinorrhea  Cardiovascular:  no chest pain, no palpitation  Respiratory:  no SOB, no cough  GI:  no abd pain, no vomiting, no diarrhea  urinary: no dysuria, no hematuria, no flank pain  musculoskeletal:  no joint pain, no joint swelling  skin:  no rash  neurology:  no headache, no seizure  psych: no anxiety, no depression         Allergies  Levaquin (Faint)  penicillin (Faint)  sulfa drugs (Unknown)        ANTIMICROBIALS:  cefTRIAXone   IVPB    cefTRIAXone   IVPB 1 every 24 hours      OTHER MEDS:  ALBUTerol/ipratropium for Nebulization 3 milliLiter(s) Nebulizer every 6 hours  dextrose 5% + sodium chloride 0.9%. 1000 milliLiter(s) IV Continuous <Continuous>  guaiFENesin ER 1200 milliGRAM(s) Oral every 12 hours  losartan 50 milliGRAM(s) Oral daily  OLANZapine 10 milliGRAM(s) Oral at bedtime  predniSONE   Tablet 40 milliGRAM(s) Oral daily  sertraline 100 milliGRAM(s) Oral daily      Vital Signs Last 24 Hrs  T(C): 36.6 (04 Mar 2019 12:35), Max: 36.6 (04 Mar 2019 00:46)  T(F): 97.9 (04 Mar 2019 12:35), Max: 97.9 (04 Mar 2019 12:35)  HR: 100 (04 Mar 2019 12:35) (86 - 100)  BP: 157/83 (04 Mar 2019 12:35) (111/55 - 159/80)  BP(mean): --  RR: 18 (04 Mar 2019 12:35) (18 - 18)  SpO2: 94% (04 Mar 2019 12:35) (94% - 99%)    Physical Exam:  General:    NAD,  non toxic  Head: atraumatic, normocephalic  Eye: normal sclera and conjunctiva  ENT:    no oropharyngeal lesions,   no LAD,   neck supple  Cardio:     regular S1, S2,  no murmur  Respiratory:    clear b/l,    no wheezing  abd:     soft,   BS +,   no tenderness,    no organomegaly  :   no CVAT,  no suprapubic tenderness,   no  melchor  Musculoskeletal:   no joint swelling,   no edema  vascular: no lines, normal pulses  Skin:    no rash  Neurologic:    pt was sleepy but answered the questions  psych: normal affect, no suicidal ideation                          10.3   3.03  )-----------( 144      ( 04 Mar 2019 08:37 )             32.9       03-04    145  |  107  |  19  ----------------------------<  148<H>  3.9   |  22  |  0.77    Ca    8.8      04 Mar 2019 06:55            MICROBIOLOGY:  v  .Urine Clean Catch (Midstream)  03-01-19   <10,000 CFU/mL Normal Urogenital Helen  --  --      .Blood Blood-Peripheral  03-01-19   No growth to date.  --  --          Rapid RVP Result: Detected (03-01 @ 12:32)        RADIOLOGY:  Images below reviewed personally  < from: CT Head No Cont (03.01.19 @ 18:45) >  IMPRESSION:  No interval change.  Severe severity microvascular ischemic change. No acute intracranial   hemorrhage.    < from: CT Chest No Cont (03.01.19 @ 16:46) >  IMPRESSION:  Multiple nodular and hazy opacities in the dependent portions of the   right lung lobes, which likely is secondary to infection
Follow-up Pulm Progress Note    Bright, awake and alert  98% on 2L NC  +productive cough     Medications:  MEDICATIONS  (STANDING):  ALBUTerol/ipratropium for Nebulization 3 milliLiter(s) Nebulizer every 6 hours  cefTRIAXone   IVPB      cefTRIAXone   IVPB 1 Gram(s) IV Intermittent every 24 hours  dextrose 5% + sodium chloride 0.9%. 1000 milliLiter(s) (50 mL/Hr) IV Continuous <Continuous>  guaiFENesin ER 1200 milliGRAM(s) Oral every 12 hours  losartan 50 milliGRAM(s) Oral daily  OLANZapine 10 milliGRAM(s) Oral at bedtime  predniSONE   Tablet 40 milliGRAM(s) Oral daily  sertraline 100 milliGRAM(s) Oral daily    MEDICATIONS  (PRN):  ALPRAZolam 0.25 milliGRAM(s) Oral every 12 hours PRN Anxiety          Vital Signs Last 24 Hrs  T(C): 36.3 (05 Mar 2019 11:55), Max: 36.5 (04 Mar 2019 21:44)  T(F): 97.4 (05 Mar 2019 11:55), Max: 97.7 (04 Mar 2019 21:44)  HR: 88 (05 Mar 2019 11:55) (84 - 104)  BP: 173/98 (05 Mar 2019 11:55) (168/95 - 175/96)  BP(mean): --  RR: 18 (05 Mar 2019 11:55) (17 - 18)  SpO2: 97% (05 Mar 2019 11:55) (97% - 100%) on 2L NC          03-04 @ 07:01  -  03-05 @ 07:00  --------------------------------------------------------  IN: 1490 mL / OUT: 0 mL / NET: 1490 mL          LABS:                        11.3   8.33  )-----------( 194      ( 05 Mar 2019 09:33 )             36.0     03-04    145  |  107  |  19  ----------------------------<  148<H>  3.9   |  22  |  0.77    Ca    8.8      04 Mar 2019 06:55            CAPILLARY BLOOD GLUCOSE        PT/INR - ( 05 Mar 2019 09:31 )   PT: 43.2 sec;   INR: 3.61 ratio                             CULTURES: (if applicable)  Culture Results:   <10,000 CFU/mL Normal Urogenital Helen (03-01 @ 17:28)  Culture Results:   No growth to date. (03-01 @ 15:08)  Culture Results:   No growth to date. (03-01 @ 15:08)    Most recent blood culture -- 03-01 @ 17:28   -- -- .Urine Clean Catch (Midstream) 03-01 @ 17:28  Most recent blood culture -- 03-01 @ 15:08   -- -- .Blood Blood-Peripheral 03-01 @ 15:08        Physical Examination:  PULM: Rhonchi clear with cough   CVS: S1, S2 heard    RADIOLOGY REVIEWED  CT chest: < from: CT Chest No Cont (03.01.19 @ 16:46) >  CHEST:     LUNGS AND LARGE AIRWAYS: Limited evaluation secondary to motion artifact.   Patent central airways. Multiple nodular and hazy opacities in the   dependent portions of the right lung lobes.    PLEURA: No pleural effusion.    VESSELS: Atherosclerotic changes.    HEART: Heart size is normal. No pericardial effusion. Coronary artery   calcifications.    MEDIASTINUM AND REN: A 2.0 x 1.2 cm right lower paratracheal lymph node   (4:368).    CHEST WALL AND LOWER NECK: Within normal limits.    VISUALIZED UPPER ABDOMEN: Tiny hiatal hernia.    BONES: Degenerative changes. Mild loss of height of the L1 vertebral   body, new since 9/13/2013, likely chronic in nature.    IMPRESSION:  Multiple nodular and hazy opacities in the dependent portions of the   right lung lobes, which likely is secondary to infection.    < end of copied text >
Follow-up Pulm Progress Note    Lethargic currently but did not sleep well per aide  Per RN, patient was awake and alert earlier, eating breakfast  94% on 2L NC    Medications:  MEDICATIONS  (STANDING):  ALBUTerol/ipratropium for Nebulization 3 milliLiter(s) Nebulizer every 6 hours  cefTRIAXone   IVPB      cefTRIAXone   IVPB 1 Gram(s) IV Intermittent every 24 hours  dextrose 5% + sodium chloride 0.9%. 1000 milliLiter(s) (50 mL/Hr) IV Continuous <Continuous>  losartan 50 milliGRAM(s) Oral daily  methylPREDNISolone sodium succinate Injectable 20 milliGRAM(s) IV Push every 8 hours  OLANZapine 10 milliGRAM(s) Oral at bedtime  sertraline 100 milliGRAM(s) Oral daily    MEDICATIONS  (PRN):          Vital Signs Last 24 Hrs  T(C): 36.3 (04 Mar 2019 09:05), Max: 36.6 (04 Mar 2019 00:46)  T(F): 97.4 (04 Mar 2019 09:05), Max: 97.8 (04 Mar 2019 00:46)  HR: 100 (04 Mar 2019 09:05) (86 - 100)  BP: 159/80 (04 Mar 2019 09:05) (111/55 - 159/80)  BP(mean): --  RR: 18 (04 Mar 2019 09:05) (18 - 18)  SpO2: 94% (04 Mar 2019 09:05) (94% - 99%) on 2L NC          03-03 @ 07:01  -  03-04 @ 07:00  --------------------------------------------------------  IN: 420 mL / OUT: 0 mL / NET: 420 mL          LABS:                        10.3   3.03  )-----------( 144      ( 04 Mar 2019 08:37 )             32.9     03-04    145  |  107  |  19  ----------------------------<  148<H>  3.9   |  22  |  0.77    Ca    8.8      04 Mar 2019 06:55            CAPILLARY BLOOD GLUCOSE        PT/INR - ( 04 Mar 2019 09:25 )   PT: 29.3 sec;   INR: 2.50 ratio                             CULTURES: (if applicable)  Culture Results:   <10,000 CFU/mL Normal Urogenital Helen (03-01 @ 17:28)  Culture Results:   No growth to date. (03-01 @ 15:08)  Culture Results:   No growth to date. (03-01 @ 15:08)    Most recent blood culture -- 03-01 @ 17:28   -- -- .Urine Clean Catch (Midstream) 03-01 @ 17:28  Most recent blood culture -- 03-01 @ 15:08   -- -- .Blood Blood-Peripheral 03-01 @ 15:08        Physical Examination:  PULM: Rhonchi bilaterally   CVS: S1, S2 heard    RADIOLOGY REVIEWED  CT chest: < from: CT Chest No Cont (03.01.19 @ 16:46) >  CHEST:     LUNGS AND LARGE AIRWAYS: Limited evaluation secondary to motion artifact.   Patent central airways. Multiple nodular and hazy opacities in the   dependent portions of the right lung lobes.    PLEURA: No pleural effusion.    VESSELS: Atherosclerotic changes.    HEART: Heart size is normal. No pericardial effusion. Coronary artery   calcifications.    MEDIASTINUM AND REN: A 2.0 x 1.2 cm right lower paratracheal lymph node   (4:368).    CHEST WALL AND LOWER NECK: Within normal limits.    VISUALIZED UPPER ABDOMEN: Tiny hiatal hernia.    BONES: Degenerative changes. Mild loss of height of the L1 vertebral   body, new since 9/13/2013, likely chronic in nature.    IMPRESSION:  Multiple nodular and hazy opacities in the dependent portions of the   right lung lobes, which likely is secondary to infection.    < end of copied text >
Follow-up Pulm Progress Note    No new respiratory events overnight.   Persistent cough, mentation poor  85% on RA at rest  95% on 2L NC at rest  Patient is wheelchair bound    Medications:  MEDICATIONS  (STANDING):  ALBUTerol/ipratropium for Nebulization 3 milliLiter(s) Nebulizer every 6 hours  guaiFENesin ER 1200 milliGRAM(s) Oral every 12 hours  losartan 50 milliGRAM(s) Oral daily  OLANZapine 10 milliGRAM(s) Oral at bedtime  sertraline 100 milliGRAM(s) Oral daily  sodium chloride 3%  Inhalation 3 milliLiter(s) Inhalation every 6 hours    MEDICATIONS  (PRN):  ALPRAZolam 0.25 milliGRAM(s) Oral every 12 hours PRN Anxiety          Vital Signs Last 24 Hrs  T(C): 37.1 (08 Mar 2019 08:24), Max: 37.1 (08 Mar 2019 08:24)  T(F): 98.7 (08 Mar 2019 08:24), Max: 98.7 (08 Mar 2019 08:24)  HR: 96 (08 Mar 2019 08:24) (88 - 100)  BP: 151/87 (08 Mar 2019 08:24) (106/70 - 160/82)  BP(mean): --  RR: 18 (08 Mar 2019 08:24) (18 - 18)  SpO2: 93% (08 Mar 2019 08:24) (92% - 97%) on 2L NC          03-07 @ 07:01  -  03-08 @ 07:00  --------------------------------------------------------  IN: 530 mL / OUT: 0 mL / NET: 530 mL          LABS:                        11.1   6.2   )-----------( 199      ( 08 Mar 2019 06:53 )             31.3                 CAPILLARY BLOOD GLUCOSE        PT/INR - ( 08 Mar 2019 06:53 )   PT: 15.8 sec;   INR: 1.37 ratio                             CULTURES: (if applicable)  Culture Results:   <10,000 CFU/mL Normal Urogenital Helen (03-01 @ 17:28)  Culture Results:   No growth at 5 days. (03-01 @ 15:08)  Culture Results:   No growth at 5 days. (03-01 @ 15:08)          Physical Examination:  PULM: Decreased BS at bases  CVS: S1, S2 heard    RADIOLOGY REVIEWED  CXR:  3/7: Low lung volumes  Grossly clear  L pleural effusion/atelectasis
INFECTIOUS DISEASES FOLLOW UP--Den Amor MD  Pager [phone]    This is a follow up note for this  87y Female with possible  Pneumonia    Further ROS:  limited responses  CONSTITUTIONAL:  No fever, good appetite  CARDIOVASCULAR:  No chest pain or palpitations  RESPIRATORY:  No dyspnea    Allergies    Levaquin (Faint)  penicillin (Faint)  sulfa drugs (Unknown)    Intolerances    Lorabid (Faint)      ANTIBIOTICS/RELEVANT:  antimicrobials  azithromycin  IVPB 500 milliGRAM(s) IV Intermittent every 24 hours  azithromycin  IVPB      cefTRIAXone   IVPB      cefTRIAXone   IVPB 1 Gram(s) IV Intermittent every 24 hours    immunologic:    OTHER:  ALBUTerol/ipratropium for Nebulization 3 milliLiter(s) Nebulizer every 6 hours  dextrose 5% + sodium chloride 0.9%. 1000 milliLiter(s) IV Continuous <Continuous>  losartan 50 milliGRAM(s) Oral daily  OLANZapine 10 milliGRAM(s) Oral at bedtime  sertraline 100 milliGRAM(s) Oral daily      Objective:  Vital Signs Last 24 Hrs  T(C): 37.1 (02 Mar 2019 07:45), Max: 38.4 (01 Mar 2019 12:05)  T(F): 98.7 (02 Mar 2019 07:45), Max: 101.2 (01 Mar 2019 12:05)  HR: 98 (02 Mar 2019 07:45) (75 - 101)  BP: 137/92 (02 Mar 2019 07:45) (116/63 - 165/99)  BP(mean): --  RR: 18 (02 Mar 2019 07:45) (16 - 20)  SpO2: 98% (02 Mar 2019 07:45) (89% - 100%)    PHYSICAL EXAM:  Constitutional:no acute distress  Eyes:DILAN, EOMI  Ear/Nose/Throat: no oral lesions, 	  Respiratory: clear BL  Cardiovascular: S1S2  Gastrointestinal:soft, (+) BS, no tenderness  Extremities:no e/e/c  No Lymphadenopathy  IV sites not inflammed.    LABS:                        11.5   4.9   )-----------( 112      ( 01 Mar 2019 18:07 )             33.5     03-    141  |  104  |  26<H>  ----------------------------<  100<H>  5.0   |  24  |  1.16    Ca    9.1      01 Mar 2019 18:07  Phos  3.7     -  Mg     1.9         TPro  7.4  /  Alb  3.6  /  TBili  0.5  /  DBili  x   /  AST  50<H>  /  ALT  14  /  AlkPhos  78      PT/INR - ( 01 Mar 2019 18:07 )   PT: 22.8 sec;   INR: 1.96 ratio         PTT - ( 01 Mar 2019 18:07 )  PTT:33.2 sec  Urinalysis Basic - ( 01 Mar 2019 13:03 )    Color: Yellow / Appearance: Slightly Turbid / S.026 / pH: x  Gluc: x / Ketone: Negative  / Bili: Negative / Urobili: Negative   Blood: x / Protein: 100 / Nitrite: Negative   Leuk Esterase: Negative / RBC: 4 /hpf / WBC 3 /HPF   Sq Epi: x / Non Sq Epi: 9 /hpf / Bacteria: Negative    MICROBIOLOGY: no culture data    RADIOLOGY & ADDITIONAL STUDIES:    < from: CT Chest No Cont (19 @ 16:46) >      < end of copied text >  < from: CT Chest No Cont (19 @ 16:46) >    IMPRESSION:  Multiple nodular and hazy opacities in the dependent portions of the   right lung lobes, which likely is secondary to infection.    < end of copied text >
Patient is a 87y old  Female who presents with a chief complaint of AMS (01 Mar 2019 17:27)      Any change in ROS: Manda josue ok: wheezing today     MEDICATIONS  (STANDING):  ALBUTerol/ipratropium for Nebulization 3 milliLiter(s) Nebulizer every 6 hours  cefTRIAXone   IVPB      cefTRIAXone   IVPB 1 Gram(s) IV Intermittent every 24 hours  dextrose 5% + sodium chloride 0.9%. 1000 milliLiter(s) (50 mL/Hr) IV Continuous <Continuous>  losartan 50 milliGRAM(s) Oral daily  OLANZapine 10 milliGRAM(s) Oral at bedtime  potassium chloride  10 mEq/100 mL IVPB 10 milliEquivalent(s) IV Intermittent once  sertraline 100 milliGRAM(s) Oral daily  warfarin 2 milliGRAM(s) Oral once    MEDICATIONS  (PRN):    Vital Signs Last 24 Hrs  T(C): 36.4 (03 Mar 2019 08:41), Max: 37.6 (02 Mar 2019 20:57)  T(F): 97.5 (03 Mar 2019 08:41), Max: 99.6 (02 Mar 2019 20:57)  HR: 89 (03 Mar 2019 08:41) (84 - 101)  BP: 138/85 (03 Mar 2019 08:41) (113/64 - 157/83)  BP(mean): --  RR: 18 (03 Mar 2019 08:41) (18 - 18)  SpO2: 99% (03 Mar 2019 08:41) (95% - 99%)    I&O's Summary    02 Mar 2019 07:01  -  03 Mar 2019 07:00  --------------------------------------------------------  IN: 960 mL / OUT: 0 mL / NET: 960 mL          Physical Exam:   GENERAL: NAD, well-groomed, well-developed  HEENT: DILAN/   Atraumatic, Normocephalic  ENMT: No tonsillar erythema, exudates, or enlargement; Moist mucous membranes, Good dentition, No lesions  NECK: Supple, No JVD, Normal thyroid  CHEST/LUNG: Wheezing today   CVS: Regular rate and rhythm; No murmurs, rubs, or gallops  GI: : Soft, Nontender, Nondistended; Bowel sounds present  NERVOUS SYSTEM:  Alert & Oriented X2-3  EXTREMITIES:  2+ Peripheral Pulses, No clubbing, cyanosis, or edema  LYMPH: No lymphadenopathy noted  SKIN: No rashes or lesions  ENDOCRINOLOGY: No Thyromegaly  PSYCH: Appropriate    Labs:  ABG - ( 01 Mar 2019 17:52 )  pH, Arterial: 7.41  pH, Blood: x     /  pCO2: 45    /  pO2: 54    / HCO3: 28    / Base Excess: 3.3   /  SaO2: 87              27                            9.8    3.80  )-----------( 133      ( 03 Mar 2019 09:48 )             31.6                         11.5   4.9   )-----------( 112      ( 01 Mar 2019 18:07 )             33.5                         12.2   6.0   )-----------( 132      ( 01 Mar 2019 12:32 )             35.4     03-03    144  |  108  |  21  ----------------------------<  89  3.4<L>   |  23  |  1.01      141  |  104  |  26<H>  ----------------------------<  100<H>  5.0   |  24  |  1.16      142  |  104  |  24<H>  ----------------------------<  120<H>  3.9   |  22  |  1.14    Ca    8.8      03 Mar 2019 06:09  Ca    9.1      01 Mar 2019 18:07  Ca    9.4      01 Mar 2019 12:32  Phos  3.7     03-  Mg     1.9     -    TPro  7.4  /  Alb  3.6  /  TBili  0.5  /  DBili  x   /  AST  50<H>  /  ALT  14  /  AlkPhos  78  03-  TPro  7.5  /  Alb  3.8  /  TBili  0.5  /  DBili  x   /  AST  37  /  ALT  13  /  AlkPhos  81  03-01    CAPILLARY BLOOD GLUCOSE          LIVER FUNCTIONS - ( 01 Mar 2019 18:07 )  Alb: 3.6 g/dL / Pro: 7.4 g/dL / ALK PHOS: 78 U/L / ALT: 14 U/L / AST: 50 U/L / GGT: x           PT/INR - ( 03 Mar 2019 09:51 )   PT: 26.4 sec;   INR: 2.24 ratio         PTT - ( 01 Mar 2019 18:07 )  PTT:33.2 sec  Urinalysis Basic - ( 01 Mar 2019 13:03 )    Color: Yellow / Appearance: Slightly Turbid / S.026 / pH: x  Gluc: x / Ketone: Negative  / Bili: Negative / Urobili: Negative   Blood: x / Protein: 100 / Nitrite: Negative   Leuk Esterase: Negative / RBC: 4 /hpf / WBC 3 /HPF   Sq Epi: x / Non Sq Epi: 9 /hpf / Bacteria: Negative            RECENT CULTURES:   @ 17:28 .Urine Clean Catch (Midstream)                <10,000 CFU/mL Normal Urogenital Helen     @ 15:08 .Blood Blood-Peripheral       < from: CT Chest No Cont (19 @ 16:46) >    CHEST:     LUNGS AND LARGE AIRWAYS: Limited evaluation secondary to motion artifact.   Patent central airways. Multiple nodular and hazy opacities in the   dependent portions of the right lung lobes.    PLEURA: No pleural effusion.    VESSELS: Atherosclerotic changes.    HEART: Heart size is normal. No pericardial effusion. Coronary artery   calcifications.    MEDIASTINUM AND REN: A 2.0 x 1.2 cm right lower paratracheal lymph node   (4:368).    CHEST WALL AND LOWER NECK: Within normal limits.    VISUALIZED UPPER ABDOMEN: Tiny hiatal hernia.    BONES: Degenerative changes. Mild loss of height of the L1 vertebral   body, new since 2013, likely chronic in nature.    IMPRESSION:  Multiple nodular and hazy opacities in the dependent portions of the   right lung lobes, which likely is secondary to infection.                    EUGENIO ALLAN M.D., RADIOLOGY RESIDENT  This document has been electronically signed.  TAMIKA MILLER M.D., ATTENDING RADIOLOGIST  This document has been electronically signed. Mar  1 2019  5:07PM        < end of copied text >           No growth to date.          RESPIRATORY CULTURES:          Studies  Chest X-RAY  CT SCAN Chest   Venous Dopplers: LE:   CT Abdomen  Others
ollow Up:  viral URI and pneumonia    Interval History: pt stable and afebrile,  speech and swallow showed aspirations    ROS:      All other systems negative    Constitutional: no fever, no chills  Head: no trauma  Eyes: no vision changes, no eye pain  ENT:  no sore throat, no rhinorrhea  Cardiovascular:  no chest pain, no palpitation  Respiratory:  no SOB, no cough  GI:  no abd pain, no vomiting, no diarrhea  urinary: no dysuria, no hematuria, no flank pain  musculoskeletal:  no joint pain, no joint swelling  skin:  no rash  neurology:  no headache, no seizure  psych: no anxiety, no depression       Allergies  Levaquin (Faint)  penicillin (Faint)  sulfa drugs (Unknown)        ANTIMICROBIALS:  cefTRIAXone   IVPB    cefTRIAXone   IVPB 1 every 24 hours      OTHER MEDS:  ALBUTerol/ipratropium for Nebulization 3 milliLiter(s) Nebulizer every 6 hours  ALPRAZolam 0.25 milliGRAM(s) Oral every 12 hours PRN  dextrose 5% + sodium chloride 0.9%. 1000 milliLiter(s) IV Continuous <Continuous>  guaiFENesin ER 1200 milliGRAM(s) Oral every 12 hours  losartan 50 milliGRAM(s) Oral daily  OLANZapine 10 milliGRAM(s) Oral at bedtime  sertraline 100 milliGRAM(s) Oral daily  sodium chloride 3%  Inhalation 3 milliLiter(s) Inhalation every 6 hours      Vital Signs Last 24 Hrs  T(C): 37 (07 Mar 2019 15:54), Max: 37 (07 Mar 2019 15:54)  T(F): 98.6 (07 Mar 2019 15:54), Max: 98.6 (07 Mar 2019 15:54)  HR: 99 (07 Mar 2019 15:54) (78 - 99)  BP: 106/70 (07 Mar 2019 15:54) (106/70 - 160/82)  BP(mean): --  RR: 18 (07 Mar 2019 15:54) (18 - 18)  SpO2: 95% (07 Mar 2019 15:54) (95% - 100%)    Physical Exam:  General:    NAD,  non toxic  Head: atraumatic, normocephalic  Eye: normal sclera and conjunctiva  ENT:    no oropharyngeal lesions,   no LAD,   neck supple  Cardio:     regular S1, S2,  no murmur  Respiratory:    clear b/l,    no wheezing  abd:     soft,   BS +,   no tenderness,    no organomegaly  :   no CVAT,  no suprapubic tenderness,   no  melchor  Musculoskeletal:   no joint swelling,   no edema  vascular: no lines, normal pulses  Skin:    no rash  Neurologic:    pt was sleepy but answered the questions  psych: normal affect                  MICROBIOLOGY:  v  .Urine Clean Catch (Midstream)  03-01-19   <10,000 CFU/mL Normal Urogenital Helen  --  --      .Blood Blood-Peripheral  03-01-19   No growth at 5 days.  --  --          Rapid RVP Result: Detected (03-01 @ 12:32)        RADIOLOGY:  Images below reviewed personally  < from: Xray Modified Barium Swallow (03.05.19 @ 10:51) >  IMPRESSION:       There is evidence of laryngeal penetration and aspiration with the tested   consistencies.    For further information and recommendations, please refer to the speech   pathologist final report which is available for review in the electronic   medical record.    < from: CT Chest No Cont (03.01.19 @ 16:46) >  IMPRESSION:  Multiple nodular and hazy opacities in the dependent portions of the   right lung lobes, which likely is secondary to infection.
Follow-up Pulm Progress Note    No new respiratory events overnight.  Denies SOB/CP.   +productive cough  95% on RA    Medications:  MEDICATIONS  (STANDING):  ALBUTerol/ipratropium for Nebulization 3 milliLiter(s) Nebulizer every 6 hours  cefTRIAXone   IVPB      cefTRIAXone   IVPB 1 Gram(s) IV Intermittent every 24 hours  dextrose 5% + sodium chloride 0.9%. 1000 milliLiter(s) (50 mL/Hr) IV Continuous <Continuous>  guaiFENesin ER 1200 milliGRAM(s) Oral every 12 hours  losartan 50 milliGRAM(s) Oral daily  OLANZapine 10 milliGRAM(s) Oral at bedtime  sertraline 100 milliGRAM(s) Oral daily    MEDICATIONS  (PRN):  ALPRAZolam 0.25 milliGRAM(s) Oral every 12 hours PRN Anxiety          Vital Signs Last 24 Hrs  T(C): 36.3 (06 Mar 2019 12:13), Max: 36.4 (06 Mar 2019 06:20)  T(F): 97.4 (06 Mar 2019 12:13), Max: 97.5 (06 Mar 2019 06:20)  HR: 91 (06 Mar 2019 12:13) (83 - 96)  BP: 152/79 (06 Mar 2019 12:13) (152/79 - 180/100)  BP(mean): --  RR: 18 (06 Mar 2019 12:13) (18 - 19)  SpO2: 97% (06 Mar 2019 12:13) (88% - 97%) on RA          03-05 @ 07:01  -  03-06 @ 07:00  --------------------------------------------------------  IN: 740 mL / OUT: 0 mL / NET: 740 mL          LABS:                        11.3   8.33  )-----------( 194      ( 05 Mar 2019 09:33 )             36.0                 CAPILLARY BLOOD GLUCOSE        PT/INR - ( 06 Mar 2019 09:34 )   PT: 59.6 sec;   INR: 4.97 ratio                             CULTURES: (if applicable)  Culture Results:   <10,000 CFU/mL Normal Urogenital Helen (03-01 @ 17:28)  Culture Results:   No growth to date. (03-01 @ 15:08)  Culture Results:   No growth to date. (03-01 @ 15:08)    Most recent blood culture -- 03-01 @ 17:28   -- -- .Urine Clean Catch (Midstream) 03-01 @ 17:28  Most recent blood culture -- 03-01 @ 15:08   -- -- .Blood Blood-Peripheral 03-01 @ 15:08        Physical Examination:  PULM: Grossly clear   CVS: S1, S2 heard    RADIOLOGY REVIEWED  CT chest: < from: CT Chest No Cont (03.01.19 @ 16:46) >  CHEST:     LUNGS AND LARGE AIRWAYS: Limited evaluation secondary to motion artifact.   Patent central airways. Multiple nodular and hazy opacities in the   dependent portions of the right lung lobes.    PLEURA: No pleural effusion.    VESSELS: Atherosclerotic changes.    HEART: Heart size is normal. No pericardial effusion. Coronary artery   calcifications.    MEDIASTINUM AND REN: A 2.0 x 1.2 cm right lower paratracheal lymph node   (4:368).    CHEST WALL AND LOWER NECK: Within normal limits.    VISUALIZED UPPER ABDOMEN: Tiny hiatal hernia.    BONES: Degenerative changes. Mild loss of height of the L1 vertebral   body, new since 9/13/2013, likely chronic in nature.    IMPRESSION:  Multiple nodular and hazy opacities in the dependent portions of the   right lung lobes, which likely is secondary to infection.    < end of copied text >

## 2019-03-09 NOTE — PROGRESS NOTE ADULT - PROVIDER SPECIALTY LIST ADULT
Infectious Disease
Internal Medicine
Neurology
Pulmonology

## 2019-05-02 ENCOUNTER — OUTPATIENT (OUTPATIENT)
Dept: OUTPATIENT SERVICES | Facility: HOSPITAL | Age: 84
LOS: 1 days | Discharge: HOME | End: 2019-05-02
Payer: MEDICARE

## 2019-05-02 ENCOUNTER — APPOINTMENT (OUTPATIENT)
Dept: SPEECH THERAPY | Facility: HOSPITAL | Age: 84
End: 2019-05-02
Payer: MEDICARE

## 2019-05-02 ENCOUNTER — OUTPATIENT (OUTPATIENT)
Dept: OUTPATIENT SERVICES | Facility: HOSPITAL | Age: 84
LOS: 1 days | End: 2019-05-02

## 2019-05-02 ENCOUNTER — APPOINTMENT (OUTPATIENT)
Dept: RADIOLOGY | Facility: HOSPITAL | Age: 84
End: 2019-05-02
Payer: MEDICARE

## 2019-05-02 DIAGNOSIS — T17.928A FOOD IN RESPIRATORY TRACT, PART UNSPECIFIED CAUSING OTHER INJURY, INITIAL ENCOUNTER: ICD-10-CM

## 2019-05-02 DIAGNOSIS — R13.10 DYSPHAGIA, UNSPECIFIED: ICD-10-CM

## 2019-05-02 PROCEDURE — 74230 X-RAY XM SWLNG FUNCJ C+: CPT | Mod: 26

## 2019-05-03 ENCOUNTER — APPOINTMENT (OUTPATIENT)
Dept: OPHTHALMOLOGY | Facility: CLINIC | Age: 84
End: 2019-05-03
Payer: MEDICARE

## 2019-05-03 DIAGNOSIS — H25.13 AGE-RELATED NUCLEAR CATARACT, BILATERAL: ICD-10-CM

## 2019-05-03 PROCEDURE — 92004 COMPRE OPH EXAM NEW PT 1/>: CPT

## 2019-05-07 DIAGNOSIS — R13.12 DYSPHAGIA, OROPHARYNGEAL PHASE: ICD-10-CM

## 2019-05-07 PROBLEM — H25.13 AGE-RELATED NUCLEAR CATARACT OF BOTH EYES: Status: ACTIVE | Noted: 2019-05-07

## 2019-05-15 ENCOUNTER — EMERGENCY (EMERGENCY)
Facility: HOSPITAL | Age: 84
LOS: 1 days | Discharge: HOME | End: 2019-05-15
Attending: EMERGENCY MEDICINE
Payer: MEDICARE

## 2019-05-15 VITALS
WEIGHT: 149.91 LBS | OXYGEN SATURATION: 97 % | RESPIRATION RATE: 17 BRPM | SYSTOLIC BLOOD PRESSURE: 140 MMHG | HEART RATE: 90 BPM | DIASTOLIC BLOOD PRESSURE: 82 MMHG

## 2019-05-15 VITALS — DIASTOLIC BLOOD PRESSURE: 97 MMHG | SYSTOLIC BLOOD PRESSURE: 164 MMHG | HEART RATE: 82 BPM

## 2019-05-15 LAB
ALBUMIN SERPL ELPH-MCNC: 3.7 G/DL — SIGNIFICANT CHANGE UP (ref 3.3–5)
ALP SERPL-CCNC: 96 U/L — SIGNIFICANT CHANGE UP (ref 40–120)
ALT FLD-CCNC: 15 U/L — SIGNIFICANT CHANGE UP (ref 10–45)
ANION GAP SERPL CALC-SCNC: 12 MMOL/L — SIGNIFICANT CHANGE UP (ref 5–17)
APPEARANCE UR: CLEAR — SIGNIFICANT CHANGE UP
APTT BLD: 42.8 SEC — HIGH (ref 27.5–36.3)
AST SERPL-CCNC: 22 U/L — SIGNIFICANT CHANGE UP (ref 10–40)
BACTERIA # UR AUTO: NEGATIVE — SIGNIFICANT CHANGE UP
BASOPHILS # BLD AUTO: 0 K/UL — SIGNIFICANT CHANGE UP (ref 0–0.2)
BASOPHILS NFR BLD AUTO: 0.1 % — SIGNIFICANT CHANGE UP (ref 0–2)
BILIRUB SERPL-MCNC: 0.3 MG/DL — SIGNIFICANT CHANGE UP (ref 0.2–1.2)
BILIRUB UR-MCNC: NEGATIVE — SIGNIFICANT CHANGE UP
BLD GP AB SCN SERPL QL: NEGATIVE — SIGNIFICANT CHANGE UP
BUN SERPL-MCNC: 24 MG/DL — HIGH (ref 7–23)
CALCIUM SERPL-MCNC: 9.4 MG/DL — SIGNIFICANT CHANGE UP (ref 8.4–10.5)
CHLORIDE SERPL-SCNC: 108 MMOL/L — SIGNIFICANT CHANGE UP (ref 96–108)
CO2 SERPL-SCNC: 23 MMOL/L — SIGNIFICANT CHANGE UP (ref 22–31)
COLOR SPEC: SIGNIFICANT CHANGE UP
CREAT SERPL-MCNC: 1.08 MG/DL — SIGNIFICANT CHANGE UP (ref 0.5–1.3)
DIFF PNL FLD: NEGATIVE — SIGNIFICANT CHANGE UP
EOSINOPHIL # BLD AUTO: 0.2 K/UL — SIGNIFICANT CHANGE UP (ref 0–0.5)
EOSINOPHIL NFR BLD AUTO: 2.7 % — SIGNIFICANT CHANGE UP (ref 0–6)
EPI CELLS # UR: 4 — SIGNIFICANT CHANGE UP
GAS PNL BLDV: SIGNIFICANT CHANGE UP
GLUCOSE SERPL-MCNC: 105 MG/DL — HIGH (ref 70–99)
GLUCOSE UR QL: NEGATIVE — SIGNIFICANT CHANGE UP
HCT VFR BLD CALC: 37.4 % — SIGNIFICANT CHANGE UP (ref 34.5–45)
HGB BLD-MCNC: 12.6 G/DL — SIGNIFICANT CHANGE UP (ref 11.5–15.5)
HYALINE CASTS # UR AUTO: 4 /LPF — HIGH (ref 0–7)
INR BLD: 2.4 RATIO — HIGH (ref 0.88–1.16)
KETONES UR-MCNC: NEGATIVE — SIGNIFICANT CHANGE UP
LEUKOCYTE ESTERASE UR-ACNC: NEGATIVE — SIGNIFICANT CHANGE UP
LYMPHOCYTES # BLD AUTO: 1 K/UL — SIGNIFICANT CHANGE UP (ref 1–3.3)
LYMPHOCYTES # BLD AUTO: 14.8 % — SIGNIFICANT CHANGE UP (ref 13–44)
MCHC RBC-ENTMCNC: 30 PG — SIGNIFICANT CHANGE UP (ref 27–34)
MCHC RBC-ENTMCNC: 33.7 GM/DL — SIGNIFICANT CHANGE UP (ref 32–36)
MCV RBC AUTO: 89.2 FL — SIGNIFICANT CHANGE UP (ref 80–100)
MONOCYTES # BLD AUTO: 0.5 K/UL — SIGNIFICANT CHANGE UP (ref 0–0.9)
MONOCYTES NFR BLD AUTO: 8 % — SIGNIFICANT CHANGE UP (ref 2–14)
NEUTROPHILS # BLD AUTO: 5 K/UL — SIGNIFICANT CHANGE UP (ref 1.8–7.4)
NEUTROPHILS NFR BLD AUTO: 74.5 % — SIGNIFICANT CHANGE UP (ref 43–77)
NITRITE UR-MCNC: NEGATIVE — SIGNIFICANT CHANGE UP
PH UR: 6.5 — SIGNIFICANT CHANGE UP (ref 5–8)
PLATELET # BLD AUTO: 181 K/UL — SIGNIFICANT CHANGE UP (ref 150–400)
POTASSIUM SERPL-MCNC: 4 MMOL/L — SIGNIFICANT CHANGE UP (ref 3.5–5.3)
POTASSIUM SERPL-SCNC: 4 MMOL/L — SIGNIFICANT CHANGE UP (ref 3.5–5.3)
PROT SERPL-MCNC: 6.9 G/DL — SIGNIFICANT CHANGE UP (ref 6–8.3)
PROT UR-MCNC: SIGNIFICANT CHANGE UP
PROTHROM AB SERPL-ACNC: 28.1 SEC — HIGH (ref 10–12.9)
RBC # BLD: 4.2 M/UL — SIGNIFICANT CHANGE UP (ref 3.8–5.2)
RBC # FLD: 14.9 % — HIGH (ref 10.3–14.5)
RBC CASTS # UR COMP ASSIST: 2 /HPF — SIGNIFICANT CHANGE UP (ref 0–4)
RH IG SCN BLD-IMP: POSITIVE — SIGNIFICANT CHANGE UP
SODIUM SERPL-SCNC: 143 MMOL/L — SIGNIFICANT CHANGE UP (ref 135–145)
SP GR SPEC: 1.02 — SIGNIFICANT CHANGE UP (ref 1.01–1.02)
UROBILINOGEN FLD QL: NEGATIVE — SIGNIFICANT CHANGE UP
WBC # BLD: 6.8 K/UL — SIGNIFICANT CHANGE UP (ref 3.8–10.5)
WBC # FLD AUTO: 6.8 K/UL — SIGNIFICANT CHANGE UP (ref 3.8–10.5)
WBC UR QL: 4 /HPF — SIGNIFICANT CHANGE UP (ref 0–5)

## 2019-05-15 PROCEDURE — 85027 COMPLETE CBC AUTOMATED: CPT

## 2019-05-15 PROCEDURE — 51701 INSERT BLADDER CATHETER: CPT

## 2019-05-15 PROCEDURE — 70450 CT HEAD/BRAIN W/O DYE: CPT

## 2019-05-15 PROCEDURE — 82330 ASSAY OF CALCIUM: CPT

## 2019-05-15 PROCEDURE — 93010 ELECTROCARDIOGRAM REPORT: CPT

## 2019-05-15 PROCEDURE — 81001 URINALYSIS AUTO W/SCOPE: CPT

## 2019-05-15 PROCEDURE — 83605 ASSAY OF LACTIC ACID: CPT

## 2019-05-15 PROCEDURE — 85610 PROTHROMBIN TIME: CPT

## 2019-05-15 PROCEDURE — 82435 ASSAY OF BLOOD CHLORIDE: CPT

## 2019-05-15 PROCEDURE — 71045 X-RAY EXAM CHEST 1 VIEW: CPT | Mod: 26

## 2019-05-15 PROCEDURE — 84132 ASSAY OF SERUM POTASSIUM: CPT

## 2019-05-15 PROCEDURE — 84295 ASSAY OF SERUM SODIUM: CPT

## 2019-05-15 PROCEDURE — 71045 X-RAY EXAM CHEST 1 VIEW: CPT

## 2019-05-15 PROCEDURE — 82803 BLOOD GASES ANY COMBINATION: CPT

## 2019-05-15 PROCEDURE — 85014 HEMATOCRIT: CPT

## 2019-05-15 PROCEDURE — 86901 BLOOD TYPING SEROLOGIC RH(D): CPT

## 2019-05-15 PROCEDURE — 70498 CT ANGIOGRAPHY NECK: CPT | Mod: 26

## 2019-05-15 PROCEDURE — 70496 CT ANGIOGRAPHY HEAD: CPT

## 2019-05-15 PROCEDURE — 70496 CT ANGIOGRAPHY HEAD: CPT | Mod: 26

## 2019-05-15 PROCEDURE — 86850 RBC ANTIBODY SCREEN: CPT

## 2019-05-15 PROCEDURE — 86900 BLOOD TYPING SEROLOGIC ABO: CPT

## 2019-05-15 PROCEDURE — 82947 ASSAY GLUCOSE BLOOD QUANT: CPT

## 2019-05-15 PROCEDURE — 70498 CT ANGIOGRAPHY NECK: CPT

## 2019-05-15 PROCEDURE — 80053 COMPREHEN METABOLIC PANEL: CPT

## 2019-05-15 PROCEDURE — 99285 EMERGENCY DEPT VISIT HI MDM: CPT | Mod: GC,25

## 2019-05-15 PROCEDURE — 93005 ELECTROCARDIOGRAM TRACING: CPT | Mod: XU

## 2019-05-15 PROCEDURE — 99284 EMERGENCY DEPT VISIT MOD MDM: CPT | Mod: 25

## 2019-05-15 PROCEDURE — 87086 URINE CULTURE/COLONY COUNT: CPT

## 2019-05-15 PROCEDURE — 85730 THROMBOPLASTIN TIME PARTIAL: CPT

## 2019-05-15 RX ORDER — ATORVASTATIN CALCIUM 80 MG/1
80 TABLET, FILM COATED ORAL AT BEDTIME
Refills: 0 | Status: DISCONTINUED | OUTPATIENT
Start: 2019-05-15 | End: 2019-05-19

## 2019-05-15 RX ORDER — METOPROLOL TARTRATE 50 MG
25 TABLET ORAL
Refills: 0 | Status: DISCONTINUED | OUTPATIENT
Start: 2019-05-15 | End: 2019-05-19

## 2019-05-15 RX ORDER — SERTRALINE 25 MG/1
100 TABLET, FILM COATED ORAL DAILY
Refills: 0 | Status: DISCONTINUED | OUTPATIENT
Start: 2019-05-15 | End: 2019-05-19

## 2019-05-15 RX ORDER — SODIUM CHLORIDE 9 MG/ML
1000 INJECTION INTRAMUSCULAR; INTRAVENOUS; SUBCUTANEOUS
Refills: 0 | Status: DISCONTINUED | OUTPATIENT
Start: 2019-05-15 | End: 2019-05-19

## 2019-05-15 RX ORDER — LOSARTAN POTASSIUM 100 MG/1
50 TABLET, FILM COATED ORAL DAILY
Refills: 0 | Status: DISCONTINUED | OUTPATIENT
Start: 2019-05-15 | End: 2019-05-19

## 2019-05-15 RX ORDER — OLANZAPINE 15 MG/1
10 TABLET, FILM COATED ORAL AT BEDTIME
Refills: 0 | Status: DISCONTINUED | OUTPATIENT
Start: 2019-05-15 | End: 2019-05-19

## 2019-05-15 RX ORDER — ALPRAZOLAM 0.25 MG
0.25 TABLET ORAL
Refills: 0 | Status: DISCONTINUED | OUTPATIENT
Start: 2019-05-15 | End: 2019-05-15

## 2019-05-15 RX ORDER — WARFARIN SODIUM 2.5 MG/1
2 TABLET ORAL ONCE
Refills: 0 | Status: DISCONTINUED | OUTPATIENT
Start: 2019-05-15 | End: 2019-05-19

## 2019-05-15 NOTE — H&P ADULT - HISTORY OF PRESENT ILLNESS
87F PMH AF on coumadin, CVA, aspiration pneumonia, HTN  presents with right sided weakness.   Daughter reports patient lives at home with 2 aides.  Daughter states she noticed patient was using her left hand to eat yesterday at noon which is not normal.  Daughter then noted patient was weak but patient has been on ciprofloxacin for UTI and she attributed weakness to UTI.  Due to persistent symptoms, daughter decided to bring patient to the hospital.   Patient is a poor historian but denies chest pain, abdominal pain, dyspnea, fevers, chills, nausea, vomiting.

## 2019-05-15 NOTE — ED ADULT NURSE REASSESSMENT NOTE - NS ED NURSE REASSESS COMMENT FT1
1615 pt straight cath for urine using sterile technique. UA and culture sent to Lab. Pt is incontinent. unable to provide clean catch without straight cath. Two RNs present for straight cath.

## 2019-05-15 NOTE — ED ADULT NURSE NOTE - OBJECTIVE STATEMENT
87 y.o female c c/o weakness brought in by her daughter through triage. Weakness noticed yesterday while eating lunch around 12pm on 5/14. Pt had switched hands to feed herself which wasn't baseline for pt. Pt had a stroke 6 years ago c no residual deficits. Pt on coumadin for afib/CVA. R sided weakness noted. + strong  b/l. R arm drift noted. R leg drift noted. L side- attempts to withstand gravity minimal weakness noted. Pupils +3S B/L 9 (hx of cataract as per daughter). No slurred speech. Even smile- alert to person/place/time. Pt has chronic UTI- currently on Cipro for 6 days. No NVD. No abd pain. No CP/SOB. Daughter remains at bedside.

## 2019-05-15 NOTE — CONSULT NOTE ADULT - ASSESSMENT
pt with hx of htn, a.fib on ac with r sided weakness and increase bp  r/o cva  ct noted asa daily  coumadin  tele  echo  beta blocker for rate control  lopitor 80 mg qhs  neurop  MRI/MRA brain

## 2019-05-15 NOTE — H&P ADULT - ASSESSMENT
88 y/o Female       with PMH of CVA  , HTN,   Afib (on coumadin),    depression,  anemia,  wheelchair bound  h/o  c/c  aspiration    admitted  with  right arm  weakness,  which  has  resolved  in er  daughter at  bedside  ct head, pending   afib, on coumadin  pt  has  dementia/   wheel chair bound<  labs in am     from: Transthoracic Echocardiogram w/Doppler (01.06.13 @ 12:32) >  Conclusions:  1. Mild mitral regurgitation.  2. Mild aortic regurgitation.  3. Severely dilated left atrium. LA volume index = 56  cc/m2.  4. Increased relative wall thickness with normal left  ventricular mass index consistent with concentric left  ventricular remodeling.  5. Normal left ventricular systolic function. No segmental  wall motion abnormalities.  6. Moderate diastolic dysfunction (Stage II).  7. Normal right ventricular size and function.  8. Moderate tricuspid regurgitation. Estimated right  ventricular systolic pressure equals 34 mmHg, assuming  right atrial pressure equals 10 mmHg, consistent with  normal pulmonary pressures.    < end of copied text > 86 y/o Female       with PMH of CVA  , HTN,   Afib (on coumadin),    depression,  anemia,  wheelchair bound  h/o  c/c  aspiration    admitted  with  right arm  weakness,  which  has  resolved  in er  daughter at  bedside  ct head, . cxr, pending   afib, on coumadin  pt  has  dementia/   wheel chair bound<  labs in am   per  daughter, pt  is on soft/ honey thickened  diet/  no  artificial feeding per  family    from: Transthoracic Echocardiogram w/Doppler (01.06.13 @ 12:32) >  Conclusions:  1. Mild mitral regurgitation.  2. Mild aortic regurgitation.  3. Severely dilated left atrium. LA volume index = 56  cc/m2.  4. Increased relative wall thickness with normal left  ventricular mass index consistent with concentric left  ventricular remodeling.  5. Normal left ventricular systolic function. No segmental  wall motion abnormalities.  6. Moderate diastolic dysfunction (Stage II).  7. Normal right ventricular size and function.  8. Moderate tricuspid regurgitation. Estimated right  ventricular systolic pressure equals 34 mmHg, assuming  right atrial pressure equals 10 mmHg, consistent with  normal pulmonary pressures.    < end of copied text > 86 y/o Female       with PMH of CVA  , HTN,   Afib (on coumadin),    depression,  anemia,  wheelchair bound  h/o  c/c  aspiration    admitted  with  right arm  weakness,  which  has  resolved  in er  neuro called by er  daughter at  bedside  ct head,  no cva/  carotid  stenosis.  no intervention pe r family,  . cxr, pending   afib, on coumadin  pt  has  dementia/   wheel chair bound<  per  daughter, pt  is on soft/ honey thickened  diet/  no  artificial feeding per  family  daughter from home,  does  not want pt admitted  plan is  d/c  home at familys  request    from: Transthoracic Echocardiogram w/Doppler (01.06.13 @ 12:32) >  Conclusions:  1. Mild mitral regurgitation.  2. Mild aortic regurgitation.  3. Severely dilated left atrium. LA volume index = 56  cc/m2.  4. Increased relative wall thickness with normal left  ventricular mass index consistent with concentric left  ventricular remodeling.  5. Normal left ventricular systolic function. No segmental  wall motion abnormalities.  6. Moderate diastolic dysfunction (Stage II).  7. Normal right ventricular size and function.  8. Moderate tricuspid regurgitation. Estimated right  ventricular systolic pressure equals 34 mmHg, assuming  right atrial pressure equals 10 mmHg, consistent with  normal pulmonary pressures.    < end of copied text >

## 2019-05-15 NOTE — CONSULT NOTE ADULT - SUBJECTIVE AND OBJECTIVE BOX
HPI:    Patient is a 87F with a history of Afib on Coumadin, prior TIA, HTN who presents to the ED for R sided weakness. Patient lives at home with 2 home health aides. She normally ambulates with a walker. Her daughter says that yesterday, the HHA noticed that patient was having trouble using her right arm to eat and seemed weaker on the right side. Her daughter noticed this as well when she came to see her today. She also has needed more assistance than usual to get up and walk with her walker. Patient is also currently being treated for a UTI and has been a little weaker and more tired since then. She was recently hospitalized for aspiration pneumonia as well. She denies any changes in speech, changes in vision, paresthesias, or any other acute concerns.       MEDICATIONS  (STANDING):  atorvastatin 80 milliGRAM(s) Oral at bedtime  losartan 50 milliGRAM(s) Oral daily  metoprolol tartrate 25 milliGRAM(s) Oral two times a day  OLANZapine 10 milliGRAM(s) Oral at bedtime  sertraline 100 milliGRAM(s) Oral daily  sodium chloride 0.9%. 1000 milliLiter(s) (40 mL/Hr) IV Continuous <Continuous>  warfarin 2 milliGRAM(s) Oral once    MEDICATIONS  (PRN):  ALPRAZolam 0.25 milliGRAM(s) Oral two times a day PRN Anxiety    PAST MEDICAL & SURGICAL HISTORY:  IBS (irritable bowel syndrome)  Gastritis  Atrial fibrillation  CVA (cerebral infarction)  Smoking  Hypertension  No significant past surgical history    FAMILY HISTORY:    Allergies    Levaquin (Faint)  penicillin (Faint)  sulfa drugs (Unknown)    Intolerances    Lorabid (Faint)    SHx - No smoking, No ETOH, No drug abuse      Review of Systems:  CONSTITUTIONAL:   HEENT:  No visual loss, blurred vision, double vision.  No hearing loss, sneezing, congestion, runny nose or sore throat.  SKIN:  No rash or itching.  CARDIOVASCULAR:  No chest pain, chest pressure or chest discomfort. No palpitations or edema.  RESPIRATORY:  No shortness of breath, cough or sputum.  GASTROINTESTINAL:  No anorexia, nausea, vomiting or diarrhea. No abdominal pain.  GENITOURINARY:  No dysuria. No increased frequency. No retention. No incontinence.  NEUROLOGICAL:  See HPI  MUSCULOSKELETAL:  No muscle, back pain, joint pain or stiffness.  HEMATOLOGIC:  No anemia, bleeding or bruising.  ENDOCRINOLOGIC:  No reports of sweating, cold or heat intolerance. No polyuria or polydipsia.      Vital Signs Last 24 Hrs  T(C): 36.4 (15 May 2019 19:46), Max: 36.8 (15 May 2019 15:32)  T(F): 97.5 (15 May 2019 19:46), Max: 98.2 (15 May 2019 15:32)  HR: 77 (15 May 2019 19:46) (75 - 90)  BP: 193/97 (15 May 2019 19:46) (124/64 - 193/97)  BP(mean): --  RR: 16 (15 May 2019 19:46) (16 - 17)  SpO2: 95% (15 May 2019 19:46) (95% - 98%)    General Exam:   General appearance: No acute distress    Neurological Exam:  Mental Status: Orientated to self, date and place.  Attention intact.  No dysarthria. Speech fluent.  Cranial Nerves:   PERRL, EOMI, VFF, no nystagmus.    CN V1-3 intact to light touch .  No facial asymmetry.   Motor:   Tone: normal.                  Strength:  RUE 4-/5, RLE 4-/5, otherwise 5/5 throughtout  Pronator drift: RUE drift   Dysmetria: None to finger-nose-finger  Sensation: intact to light touch      05-15    143  |  108  |  24<H>  ----------------------------<  105<H>  4.0   |  23  |  1.08    Ca    9.4      15 May 2019 15:59    TPro  6.9  /  Alb  3.7  /  TBili  0.3  /  DBili  x   /  AST  22  /  ALT  15  /  AlkPhos  96  05-15                            12.6   6.8   )-----------( 181      ( 15 May 2019 15:59 )             37.4       Radiology    < from: CT Head No Cont (05.15.19 @ 18:31) >  IMPRESSION:   CT BRAIN: Volume loss and microvascular disease without obvious   hemorrhage or midline shift, MR is more sensitive for the evaluation of   ischemia, can be performed as clinically indicated.    Limited evaluation of the spine with area of slight increased attenuation   at the T4 level, although this may reflect partial volume averaging a   small spinal lesion not excluded, MR may better assess.    CTA BRAIN: Severe focal stenosis of the distal left M1 and proximal left   M2, multifocal stenosis of the distal middle and posterior cerebral   artery branches and 4.6 mm focal stenosis of the V4 segment of the left   vertebral artery.     CTA NECK: Moderate (50-69%) stenosis by NASCET criteria of the bilateral   carotid artery bifurcation.    < end of copied text >

## 2019-05-15 NOTE — ED PROVIDER NOTE - NSFOLLOWUPINSTRUCTIONS_ED_ALL_ED_FT
-Follow-up with your Primary Care Doctor in 1-2 days.  -Follow-up with Dr. Abebe Farmer. Call number provided.   -Return to the Emergency Department for any worsening or concerning symptoms such as fevers, severe pain, trouble breathing, weakness or lightheadedness.

## 2019-05-15 NOTE — ED PROVIDER NOTE - ATTENDING CONTRIBUTION TO CARE
88 y/o female with the above documented history and HPI who on exam appears well and comfortable. VSs noted, head NC/AT, EOMsI, neck supple s/ audible bruit, lungs CTA, cardiac sounds s/ audible m/r/g, abdomen soft, NT/ND, extremities s/ asymmetry, skin s/ rash and neurologically c/ a R pronator drift. Full investigation initiated. Will follow her results and treat/consult/dispo accordingly.

## 2019-05-15 NOTE — H&P ADULT - NSHPPHYSICALEXAM_GEN_ALL_CORE
PHYSICAL EXAMINATION:  Vital Signs Last 24 Hrs  T(C): 36.8 (15 May 2019 15:32), Max: 36.8 (15 May 2019 15:32)  T(F): 98.2 (15 May 2019 15:32), Max: 98.2 (15 May 2019 15:32)  HR: 81 (15 May 2019 15:32) (81 - 90)  BP: 124/64 (15 May 2019 15:32) (124/64 - 140/82)  BP(mean): --  RR: 17 (15 May 2019 15:32) (17 - 17)  SpO2: 98% (15 May 2019 15:32) (97% - 98%)  CAPILLARY BLOOD GLUCOSE            GENERAL: NAD, well-groomed,  HEAD:  atraumatic, normocephalic  EYES: sclera anicteric  ENMT: mucous membranes moist  NECK: supple, No JVD  CHEST/LUNG: clear to auscultation bilaterally;    no      rales   ,   no rhonchi,   HEART: normal S1, S2  ABDOMEN: BS+, soft, ND, NT   EXTREMITIES:    no    edema    b/l LEs  NEURO: awake, ,   not very verbal   ableto raise  b/l  arms  now  SKIN: no     rash

## 2019-05-15 NOTE — ED PROVIDER NOTE - CARE PROVIDER_API CALL
Abebe Farmer (DO)  Neurology; Vascular Neurology  [address] [facility] [address]  [place] [address]  Phone: [phone]  Fax: [phone]  Follow Up Time:

## 2019-05-15 NOTE — CONSULT NOTE ADULT - SUBJECTIVE AND OBJECTIVE BOX
CHIEF COMPLAINT:Patient is a 87y old  Female who presents with a chief complaint of right  arm weknes (15 May 2019 18:45)      HPI:  87F PMH AF on coumadin, CVA, aspiration pneumonia, HTN  presents with right sided weakness.   Daughter reports patient lives at home with 2 aides.  Daughter states she noticed patient was using her left hand to eat yesterday at noon which is not normal.  Daughter then noted patient was weak but patient has been on ciprofloxacin for UTI and she attributed weakness to UTI.  Due to persistent symptoms, daughter decided to bring patient to the hospital.   Patient is a poor historian but denies chest pain, abdominal pain, dyspnea, fevers, chills, nausea, vomiting. (15 May 2019 18:45)      PAST MEDICAL & SURGICAL HISTORY:  IBS (irritable bowel syndrome)  Gastritis  Atrial fibrillation  CVA (cerebral infarction)  Smoking  Hypertension  No significant past surgical history      MEDICATIONS  (STANDING):  losartan 50 milliGRAM(s) Oral daily  OLANZapine 10 milliGRAM(s) Oral at bedtime  sertraline 100 milliGRAM(s) Oral daily  sodium chloride 0.9%. 1000 milliLiter(s) (40 mL/Hr) IV Continuous <Continuous>  warfarin 2 milliGRAM(s) Oral once    MEDICATIONS  (PRN):  ALPRAZolam 0.25 milliGRAM(s) Oral two times a day PRN Anxiety      FAMILY HISTORY:      SOCIAL HISTORY:    [ ] Non-smoker  [ ] Smoker  [ ] Alcohol    Allergies    Levaquin (Faint)  penicillin (Faint)  sulfa drugs (Unknown)    Intolerances    Lorabid (Faint)  	    REVIEW OF SYSTEMS:  CONSTITUTIONAL: No fever, weight loss, or fatigue  EYES: No eye pain, visual disturbances, or discharge  ENT:  No difficulty hearing, tinnitus, vertigo; No sinus or throat pain  NECK: No pain or stiffness  RESPIRATORY: No cough, wheezing, chills or hemoptysis; No Shortness of Breath  CARDIOVASCULAR: No chest pain, palpitations, passing out, dizziness, or leg swelling  GASTROINTESTINAL: No abdominal or epigastric pain. No nausea, vomiting, or hematemesis; No diarrhea or constipation. No melena or hematochezia.  GENITOURINARY: No dysuria, frequency, hematuria, or incontinence  NEUROLOGICAL: No headaches, memory loss, + r sided weakness, numbness, or tremors  SKIN: No itching, burning, rashes, or lesions   LYMPH Nodes: No enlarged glands  ENDOCRINE: No heat or cold intolerance; No hair loss  MUSCULOSKELETAL: No joint pain or swelling; No muscle, back, or extremity pain  PSYCHIATRIC: No depression, anxiety, mood swings, or difficulty sleeping  HEME/LYMPH: No easy bruising, or bleeding gums  ALLERGY AND IMMUNOLOGIC: No hives or eczema	    [ ] All others negative	  [ ] Unable to obtain    PHYSICAL EXAM:  T(C): 36.4 (05-15-19 @ 19:46), Max: 36.8 (05-15-19 @ 15:32)  HR: 77 (05-15-19 @ 19:46) (75 - 90)  BP: 193/97 (05-15-19 @ 19:46) (124/64 - 193/97)  RR: 16 (05-15-19 @ 19:46) (16 - 17)  SpO2: 95% (05-15-19 @ 19:46) (95% - 98%)  Wt(kg): --  I&O's Summary      Appearance: Normal	  HEENT:   Normal oral mucosa, PERRL, EOMI	  Lymphatic: No lymphadenopathy  Cardiovascular: Normal S1 S2, No JVD, +murmurs, No edema  Respiratory: Lungs clear to auscultation	  Psychiatry: A & O x 3, Mood & affect appropriate  Gastrointestinal:  Soft, Non-tender, + BS	  Skin: No rashes, No ecchymoses, No cyanosis	  Neurologic: Non-focal  Extremities: Normal range of motion, No clubbing, cyanosis or edema  Vascular: Peripheral pulses palpable 2+ bilaterally    TELEMETRY: 	    ECG:  	  RADIOLOGY:  OTHER: 	  	  LABS:	 	    CARDIAC MARKERS:                              12.6   6.8   )-----------( 181      ( 15 May 2019 15:59 )             37.4     05-15    143  |  108  |  24<H>  ----------------------------<  105<H>  4.0   |  23  |  1.08    Ca    9.4      15 May 2019 15:59    TPro  6.9  /  Alb  3.7  /  TBili  0.3  /  DBili  x   /  AST  22  /  ALT  15  /  AlkPhos  96  05-15    proBNP:   Lipid Profile:   HgA1c:   TSH:   PT/INR - ( 15 May 2019 15:59 )   PT: 28.1 sec;   INR: 2.40 ratio         PTT - ( 15 May 2019 15:59 )  PTT:42.8 sec    PREVIOUS DIAGNOSTIC TESTING:    < from: CT Angio Neck w/ IV Cont (05.15.19 @ 18:31) >  CT BRAIN: Volume loss and microvascular disease without obvious   hemorrhage or midline shift, MR is more sensitive for the evaluation of   ischemia, can be performed as clinically indicated.    Limited evaluation of the spine with area of slight increased attenuation   at the T4 level, although this may reflect partial volume averaging a   small spinal lesion not excluded, MR may better assess.    CTA BRAIN: Severe focal stenosis of the distal left M1 and proximal left   M2, multifocal stenosis of the distal middle and posterior cerebral   artery branches and 4.6 mm focal stenosis of the V4 segment of the left   vertebral artery.     CTA NECK: Moderate (50-69%) stenosis by NASCET criteria of the bilateral   carotid artery bifurcation.    < from: Xray Chest 1 View- PORTABLE-Urgent (05.15.19 @ 19:35) >  INTERPRETATION:  no emergent findings    < from: 12 Lead ECG (03.01.19 @ 17:39) >    Diagnosis Line ATRIAL FIBRILLATION WITH A COMPETING JUNCTIONAL PACEMAKER  MINIMAL VOLTAGE CRITERIA FOR LVH, MAY BE NORMAL VARIANT  INFERIOR INFARCT , AGE UNDETERMINED  CANNOT RULE OUT ANTERIOR INFARCT , AGE UNDETERMINED  ABNORMAL ECG    < end of copied text >

## 2019-05-15 NOTE — H&P ADULT - NSHPLABSRESULTS_GEN_ALL_CORE
LABS:                        12.6   6.8   )-----------( 181      ( 15 May 2019 15:59 )             37.4     05-15    143  |  108  |  24<H>  ----------------------------<  105<H>  4.0   |  23  |  1.08    Ca    9.4      15 May 2019 15:59    TPro  6.9  /  Alb  3.7  /  TBili  0.3  /  DBili  x   /  AST  22  /  ALT  15  /  AlkPhos  96  05-15    PT/INR - ( 15 May 2019 15:59 )   PT: 28.1 sec;   INR: 2.40 ratio         PTT - ( 15 May 2019 15:59 )  PTT:42.8 sec      Urinalysis Basic - ( 15 May 2019 16:30 )    Color: Light Yellow / Appearance: Clear / S.021 / pH: x  Gluc: x / Ketone: Negative  / Bili: Negative / Urobili: Negative   Blood: x / Protein: Trace / Nitrite: Negative   Leuk Esterase: Negative / RBC: 2 /hpf / WBC 4 /HPF   Sq Epi: x / Non Sq Epi: 4 / Bacteria: Negative          05-15 @ 15:59  4.9  58      Thyroid Stimulating Hormone, Serum: 2.03 uIU/mL ( @ 05:25)

## 2019-05-15 NOTE — ED PROVIDER NOTE - CLINICAL SUMMARY MEDICAL DECISION MAKING FREE TEXT BOX
87F PMH CVA, AF on coumadin, presents with right sided weakness since yesterday.  CT Head, neurology consult, labs, ekg.

## 2019-05-15 NOTE — CONSULT NOTE ADULT - ASSESSMENT
Patient is a 87F with a history of Afib on Coumadin, prior TIA, HTN who presents to the ED for R sided weakness. Patient lives at home with 2 home health aides. She normally ambulates with a walker. Her daughter says that yesterday, the HHA noticed that patient was having trouble using her right arm to eat and seemed weaker on the right side. Her daughter noticed this as well when she came to see her today. She also has needed more assistance than usual to get up and walk with her walker. Patient is also currently being treated for a UTI and has been a little weaker and more tired since then. She was recently hospitalized for aspiration pneumonia as well. She denies any changes in speech, changes in vision, paresthesias, or any other acute concerns.     Her INR is in the therapeutic range (2.4).     Neurological exam shows subtle weakness on the RUE/RLE (4-/5) as well as RUE drift. NIHSS 1, MRS 3. CTH showed no acute abnormalities. CTA head/neck showed focal stenosis of the distal left M1 and prox left M2 and moderate stenosis of the bilateral carotids. Discussed case with her daughter and she would prefer for patient to return home and follow-up outpatient as she has good care at home.     Recommendations:  Patient should follow-up outpatient with Dr. Farmer from vascular neurology (call [phone] for appointment)  Continue coumadin for Afib   Outpatient follow-up with PCP

## 2019-05-16 LAB
CULTURE RESULTS: NO GROWTH — SIGNIFICANT CHANGE UP
SPECIMEN SOURCE: SIGNIFICANT CHANGE UP

## 2019-06-28 PROCEDURE — 99213 OFFICE O/P EST LOW 20 MIN: CPT

## 2019-07-31 NOTE — CONSULT NOTE ADULT - SUBJECTIVE AND OBJECTIVE BOX
31-Jul-2019 09:14 HPI: 87 F PMH A fib, CVA, HTN, gastritis, bipolar, IBS, ex smoker, recurrent UTI's brought in by family due to worsening mental status. History obtained from 2 daughters and HHA at bedside.  Daughter reports that about 2 weeks ago started to become more confused. Daughter was concerned that i was due to methenamine that she recently started so she stopped that. She started to have a dry cough about 6 days ago and was given cough syrup with DM and tessalon pearls. Overnight had a fever and this morning became less responsive. Eating well until today. No reported diarrhea, pets or travel   Sick contacts HHA who just returned from [facility] and daughter  Allergies-penicillin, sulfa-unclear but it was many years ago, however tolerated cefdinir and cephalexin. She fainted after taking levaquin once    PAST MEDICAL & SURGICAL HISTORY:  IBS (irritable bowel syndrome)  Gastritis  Atrial fibrillation  CVA (cerebral infarction)  Smoking  Hypertension  No significant past surgical history      Allergies  Levaquin (Faint)  penicillin (Faint)  sulfa drugs (Unknown)        ANTIMICROBIALS:      OTHER MEDS: MEDICATIONS  (STANDING):  losartan 50 daily  OLANZapine 10 at bedtime  sertraline 100 daily  warfarin 2 once      SOCIAL HISTORY:  [ ] etoh [ ] tobacco [ x] former smoker [ ] IVDU    FAMILY HISTORY:  brother lymphoma    REVIEW OF SYSTEMS  [  ] ROS unobtainable because:    [ x ] All other systems negative except as noted below:	    Constitutional:  [ ] fever [ ] chills  [ ] weight loss  [ ] weakness  Skin:  [ ] rash [ ] phlebitis	  Eyes: [ ] icterus [ ] pain  [ ] discharge	  ENMT: [ ] sore throat  [ ] thrush [ ] ulcers [ ] exudates  Respiratory: [ ] dyspnea [ ] hemoptysis [ ] cough [ ] sputum	  Cardiovascular:  [ ] chest pain [ ] palpitations [ ] edema	  Gastrointestinal:  [ ] nausea [ ] vomiting [ ] diarrhea [ ] constipation [ ] pain	  Genitourinary:  [ ] dysuria [ ] frequency [ ] hematuria [ ] discharge [ ] flank pain  [ ] incontinence  Musculoskeletal:  [ ] myalgias [ ] arthralgias [ ] arthritis  [ ] back pain  Neurological:  [ ] headache [ ] seizures  [ ] confusion/altered mental status  Psychiatric:  [ ] anxiety [ ] depression	  Hematology/Lymphatics:  [ ] lymphadenopathy  Endocrine:  [ ] adrenal [ ] thyroid  Allergic/Immunologic:	 [ ] transplant [ ] seasonal    Vital Signs Last 24 Hrs  T(F): 99.8 (19 @ 16:03), Max: 101.2 (19 @ 12:05)    Vital Signs Last 24 Hrs  HR: 87 (19 @ 16:03) (87 - 101)  BP: 128/69 (19 @ 16:03) (128/69 - 165/99)  RR: 16 (19 @ 16:03)  SpO2: 98% (19 @ 16:03) (89% - 98%)  Wt(kg): --    PHYSICAL EXAM:  General: obtunded, moans to pain stimulus, on oxygen via nasal canula  HEAD/EYES: anicteric, PERRL  ENT:  supple  Cardiovascular:   S1, S2  Respiratory:  expiratory wheeze bilaterally  GI:  soft, non-tender, normal bowel sounds  :  no CVA tenderness   Musculoskeletal:  no synovitis  Neurologic:  grossly non-focal  Skin:  no rash  Lymph: no lymphadenopathy  Psychiatric:  appropriate affect  Vascular:  no phlebitis                                12.2   6.0   )-----------( 132      ( 01 Mar 2019 12:32 )             35.4           142  |  104  |  24<H>  ----------------------------<  120<H>  3.9   |  22  |  1.14    Ca    9.4      01 Mar 2019 12:32    TPro  7.5  /  Alb  3.8  /  TBili  0.5  /  DBili  x   /  AST  37  /  ALT  13  /  AlkPhos  81        Urinalysis Basic - ( 01 Mar 2019 13:03 )    Color: Yellow / Appearance: Slightly Turbid / S.026 / pH: x  Gluc: x / Ketone: Negative  / Bili: Negative / Urobili: Negative   Blood: x / Protein: 100 / Nitrite: Negative   Leuk Esterase: Negative / RBC: 4 /hpf / WBC 3 /HPF   Sq Epi: x / Non Sq Epi: 9 /hpf / Bacteria: Negative        MICROBIOLOGY:          v    Rapid RVP Result: Detected ( @ 12:32)          RADIOLOGY: < from: CT Chest No Cont (19 @ 16:46) >    IMPRESSION:  Multiple nodular and hazy opacities in the dependent portions of the   right lung lobes, which likely is secondary to infection.    < end of copied text >    < from: Xray Chest 1 View- PORTABLE-Urgent (19 @ 13:47) >    Impression:    The heart is enlarged. The lungs areclear. No change in the appearance   the chest when compared to previous study done August 15, 2017. No acute   pathology is seen in the visualized bones.    < end of copied text > HPI: 87 F PMH A fib, CVA, HTN, gastritis, bipolar, IBS, ex smoker, recurrent UTI's brought in by family due to worsening mental status. History obtained from 2 daughters and HHA at bedside.  Daughter reports that about 2 weeks ago started to become more confused. Daughter was concerned that i was due to methenamine that she recently started so she stopped that. She started to have a dry cough about 6 days ago and was given cough syrup with DM and tessalon pearls. Overnight had a fever and this morning became less responsive. Eating well until today. No reported diarrhea, pets or travel   Sick contacts HHA who just returned from [facility] and daughter  Allergies-penicillin, sulfa-unclear but it was many years ago, however tolerated cefdinir and cephalexin. She fainted after taking levaquin once    PAST MEDICAL & SURGICAL HISTORY:  IBS (irritable bowel syndrome)  Gastritis  Atrial fibrillation  CVA (cerebral infarction)  Smoking  Hypertension  No significant past surgical history      Allergies  Levaquin (Faint)  penicillin (Faint)  sulfa drugs (Unknown)        ANTIMICROBIALS:      OTHER MEDS: MEDICATIONS  (STANDING):  losartan 50 daily  OLANZapine 10 at bedtime  sertraline 100 daily  warfarin 2 once      SOCIAL HISTORY:  [ ] etoh [ ] tobacco [ x] former smoker [ ] IVDU    FAMILY HISTORY:  brother lymphoma    REVIEW OF SYSTEMS  [  ] ROS unobtainable because:    [ x ] All other systems negative except as noted below:	    Constitutional:  [ ] fever [ ] chills  [ ] weight loss  [ ] weakness  Skin:  [ ] rash [ ] phlebitis	  Eyes: [ ] icterus [ ] pain  [ ] discharge	  ENMT: [ ] sore throat  [ ] thrush [ ] ulcers [ ] exudates  Respiratory: [ ] dyspnea [ ] hemoptysis [ ] cough [ ] sputum	  Cardiovascular:  [ ] chest pain [ ] palpitations [ ] edema	  Gastrointestinal:  [ ] nausea [ ] vomiting [ ] diarrhea [ ] constipation [ ] pain	  Genitourinary:  [ ] dysuria [ ] frequency [ ] hematuria [ ] discharge [ ] flank pain  [ ] incontinence  Musculoskeletal:  [ ] myalgias [ ] arthralgias [ ] arthritis  [ ] back pain  Neurological:  [ ] headache [ ] seizures  [ ] confusion/altered mental status  Psychiatric:  [ ] anxiety [ ] depression	  Hematology/Lymphatics:  [ ] lymphadenopathy  Endocrine:  [ ] adrenal [ ] thyroid  Allergic/Immunologic:	 [ ] transplant [ ] seasonal    Vital Signs Last 24 Hrs  T(F): 99.8 (19 @ 16:03), Max: 101.2 (19 @ 12:05)    Vital Signs Last 24 Hrs  HR: 87 (19 @ 16:03) (87 - 101)  BP: 128/69 (19 @ 16:03) (128/69 - 165/99)  RR: 16 (19 @ 16:03)  SpO2: 98% (19 @ 16:03) (89% - 98%)  Wt(kg): --    PHYSICAL EXAM:  General: obtunded, moans to pain stimulus, on oxygen via nasal canula  HEAD/EYES: anicteric, PERRL  ENT:  supple  Cardiovascular:   S1, S2  Respiratory:  expiratory wheeze bilaterally  GI:  soft, non-tender, normal bowel sounds  :  no CVA tenderness   Musculoskeletal:  no synovitis  Neurologic:  grossly non-focal  Skin:  no rash  Lymph: no lymphadenopathy  Psychiatric:  appropriate affect  Vascular:  no phlebitis                                12.2   6.0   )-----------( 132      ( 01 Mar 2019 12:32 )             35.4           142  |  104  |  24<H>  ----------------------------<  120<H>  3.9   |  22  |  1.14    Ca    9.4      01 Mar 2019 12:32    TPro  7.5  /  Alb  3.8  /  TBili  0.5  /  DBili  x   /  AST  37  /  ALT  13  /  AlkPhos  81        Urinalysis Basic - ( 01 Mar 2019 13:03 )    Color: Yellow / Appearance: Slightly Turbid / S.026 / pH: x  Gluc: x / Ketone: Negative  / Bili: Negative / Urobili: Negative   Blood: x / Protein: 100 / Nitrite: Negative   Leuk Esterase: Negative / RBC: 4 /hpf / WBC 3 /HPF   Sq Epi: x / Non Sq Epi: 9 /hpf / Bacteria: Negative        MICROBIOLOGY:          v    Rapid RVP Result: Detected ( @ 12:32)          RADIOLOGY: < from: CT Chest No Cont (19 @ 16:46) >    IMPRESSION:  Multiple nodular and hazy opacities in the dependent portions of the   right lung lobes, which likely is secondary to infection.    < end of copied text >    < from: Xray Chest 1 View- PORTABLE-Urgent (19 @ 13:47) >    Impression:    The heart is enlarged. The lungs areclear. No change in the appearance   the chest when compared to previous study done August 15, 2017. No acute   pathology is seen in the visualized bones.    < end of copied text >    < from: CT Chest No Cont (19 @ 16:46) >  IMPRESSION:  Multiple nodular and hazy opacities in the dependent portions of the   right lung lobes, which likely is secondary to infection.    < end of copied text >  < from: CT Chest No Cont (19 @ 16:46) >  IMPRESSION:  Multiple nodular and hazy opacities in the dependent portions of the   right lung lobes, which likely is secondary to infection.    < end of copied text > HPI: 87 F PMH A fib, CVA, HTN, gastritis, bipolar, IBS, ex smoker, recurrent UTI's brought in by family due to worsening mental status. History obtained from 2 daughters and HHA at bedside.  Daughter reports that about 2 weeks ago started to become more confused. Daughter was concerned that i was due to methenamine that she recently started so she stopped that. She started to have a dry cough about 6 days ago and was given cough syrup with DM and tessalon pearls. Overnight had a fever and this morning became less responsive. Eating well until today. No reported diarrhea, pets or travel   Sick contacts HHA who just returned from [facility] and daughter  Allergies-penicillin, sulfa-unclear but it was many years ago, however tolerated cefdinir and cephalexin. She fainted after taking levaquin once    PAST MEDICAL & SURGICAL HISTORY:  IBS (irritable bowel syndrome)  Gastritis  Atrial fibrillation  CVA (cerebral infarction)  Smoking  Hypertension  No significant past surgical history      Allergies  Levaquin (Faint)  penicillin (Faint)  sulfa drugs (Unknown)        ANTIMICROBIALS:      OTHER MEDS: MEDICATIONS  (STANDING):  losartan 50 daily  OLANZapine 10 at bedtime  sertraline 100 daily  warfarin 2 once      SOCIAL HISTORY:  [ ] etoh [ ] tobacco [ x] former smoker [ ] IVDU    FAMILY HISTORY:  brother lymphoma    REVIEW OF SYSTEMS  [  ] ROS unobtainable because:    [ x ] All other systems negative except as noted below:	    Constitutional:  [x ] fever [ ] chills  [ ] weight loss  [ ] weakness  Skin:  [ ] rash [ ] phlebitis	  Eyes: [ ] icterus [ ] pain  [ ] discharge	  ENMT: [ ] sore throat  [ ] thrush [ ] ulcers [ ] exudates  Respiratory: [ ] dyspnea [ ] hemoptysis [x ] cough [ ] sputum	  Cardiovascular:  [ ] chest pain [ ] palpitations [ ] edema	  Gastrointestinal:  [ ] nausea [ ] vomiting [ ] diarrhea [ ] constipation [ ] pain	  Genitourinary:  [ ] dysuria [ ] frequency [ ] hematuria [ ] discharge [ ] flank pain  [ ] incontinence  Musculoskeletal:  [ ] myalgias [ ] arthralgias [ ] arthritis  [ ] back pain  Neurological:  [ ] headache [ ] seizures  [ ] confusion/altered mental status  Psychiatric:  [ ] anxiety [ ] depression	  Hematology/Lymphatics:  [ ] lymphadenopathy  Endocrine:  [ ] adrenal [ ] thyroid  Allergic/Immunologic:	 [ ] transplant [ ] seasonal    Vital Signs Last 24 Hrs  T(F): 99.8 (19 @ 16:03), Max: 101.2 (19 @ 12:05)    Vital Signs Last 24 Hrs  HR: 87 (19 @ 16:03) (87 - 101)  BP: 128/69 (19 @ 16:03) (128/69 - 165/99)  RR: 16 (19 @ 16:03)  SpO2: 98% (19 @ 16:03) (89% - 98%)  Wt(kg): --    PHYSICAL EXAM:  General: obtunded, unresponsive  HEAD: atraumatic, normocephalic  EYES: anicteric, PERRL  ENT:  supple  Cardiovascular:   S1, S2  Respiratory: coarse breath sounds and expiratory wheeze bilaterally  GI:  soft, non-tender, normal bowel sounds  :  no CVA tenderness   Musculoskeletal:  no synovitis  Neurologic:  pupils 2 mm symmetric non responsive to verbal stimuli  Skin:  no rash  Lymph: no lymphadenopathy  Vascular:  no phlebitis                                12.2   6.0   )-----------( 132      ( 01 Mar 2019 12:32 )             35.4           142  |  104  |  24<H>  ----------------------------<  120<H>  3.9   |  22  |  1.14    Ca    9.4      01 Mar 2019 12:32    TPro  7.5  /  Alb  3.8  /  TBili  0.5  /  DBili  x   /  AST  37  /  ALT  13  /  AlkPhos  81        Urinalysis Basic - ( 01 Mar 2019 13:03 )    Color: Yellow / Appearance: Slightly Turbid / S.026 / pH: x  Gluc: x / Ketone: Negative  / Bili: Negative / Urobili: Negative   Blood: x / Protein: 100 / Nitrite: Negative   Leuk Esterase: Negative / RBC: 4 /hpf / WBC 3 /HPF   Sq Epi: x / Non Sq Epi: 9 /hpf / Bacteria: Negative        MICROBIOLOGY:          v    Rapid RVP Result: Detected ( @ 12:32)          RADIOLOGY: < from: CT Chest No Cont (19 @ 16:46) >    IMPRESSION:  Multiple nodular and hazy opacities in the dependent portions of the   right lung lobes, which likely is secondary to infection.    < end of copied text >    < from: Xray Chest 1 View- PORTABLE-Urgent (19 @ 13:47) >    Impression:    The heart is enlarged. The lungs areclear. No change in the appearance   the chest when compared to previous study done August 15, 2017. No acute   pathology is seen in the visualized bones.    < end of copied text >    < from: CT Chest No Cont (19 @ 16:46) >  IMPRESSION:  Multiple nodular and hazy opacities in the dependent portions of the   right lung lobes, which likely is secondary to infection.    < end of copied text >  < from: CT Chest No Cont (19 @ 16:46) >  IMPRESSION:  Multiple nodular and hazy opacities in the dependent portions of the   right lung lobes, which likely is secondary to infection.    < end of copied text >

## 2019-11-21 PROCEDURE — 99213 OFFICE O/P EST LOW 20 MIN: CPT

## 2020-01-02 NOTE — DISCHARGE NOTE PROVIDER - NSDCQMERRANDS_GEN_ALL_CORE
Pt attended and participated in most unit groups/activities.  Pt read during movie.  Pt's dad visited and had brought pt a book, which is what she opted to read during movie.  Pt requested dinner from cafeteria (earned with stars from [employer]).  Pt received meal, 84 carb grams (within parameters).  Pt initially declined miralax due to worry about diarrhea.  With some gentle encouragement, pt opted to take miralax.  Pt did not want to take Senn[place]ot this kerrie.  Pt denied SI/Self harm plan and intent.  Pt showered on day shift.  Pt's door continues to be locked 1 hour post meals.  Pt denies any medication AE.  Will continue to assess and provide support as appropriate.     Yes

## 2020-03-23 PROCEDURE — 99213 OFFICE O/P EST LOW 20 MIN: CPT

## 2020-04-14 NOTE — DIETITIAN INITIAL EVALUATION ADULT. - NUTRITIONGOAL OUTCOME1
"Pt. c fever for 2 days. Pt. has had slight cough and congestion. Pt. also itching. No other s/s or complaints. Recieved "a little bit" of tylenol around 1500. No other PRNs pta    APPEARANCE: No acute distress.    NEURO: Awake, alert, appropriate for age  HEENT: Head symmetrical. Eyes bilateral.  RESPIRATORY: Airway is open and patent. Respirations are spontaneous on room air.   NEUROVASCULAR: All extremities are warm and pink with capillary refill less than 3 seconds.   MUSCULOSKELETAL: Moves all extremities, wiggling toes and moving hands.   SKIN: Warm and dry, adequate turgor, mucus membranes moist and pink  SOCIAL: Patient is accompanied by family.   Will continue to monitor.      "
Daughter to talk back 2 teaching points

## 2020-04-20 PROCEDURE — 99213 OFFICE O/P EST LOW 20 MIN: CPT

## 2020-04-29 PROCEDURE — 99443: CPT | Mod: CR

## 2020-05-13 PROCEDURE — 99213 OFFICE O/P EST LOW 20 MIN: CPT

## 2020-06-11 PROCEDURE — 99443: CPT | Mod: 95

## 2020-07-28 PROCEDURE — 99443: CPT | Mod: 95

## 2020-09-22 PROCEDURE — 99443: CPT | Mod: 95

## 2020-11-18 PROCEDURE — 99213 OFFICE O/P EST LOW 20 MIN: CPT

## 2021-01-13 NOTE — PATIENT PROFILE ADULT - NSPROGENSOURCEINFO_GEN_A_NUR
patient Dermal Autograft Text: The defect edges were debeveled with a #15 scalpel blade.  Given the location of the defect, shape of the defect and the proximity to free margins a dermal autograft was deemed most appropriate.  Using a sterile surgical marker, the primary defect shape was transferred to the donor site. The area thus outlined was incised deep to adipose tissue with a #15 scalpel blade.  The harvested graft was then trimmed of adipose and epidermal tissue until only dermis was left.  The skin graft was then placed in the primary defect and oriented appropriately.

## 2021-01-20 PROCEDURE — 99443: CPT | Mod: 95

## 2021-05-25 PROCEDURE — 99214 OFFICE O/P EST MOD 30 MIN: CPT | Mod: 95

## 2021-09-24 ENCOUNTER — INPATIENT (INPATIENT)
Facility: HOSPITAL | Age: 86
LOS: 3 days | Discharge: HOSPICE | DRG: 884 | End: 2021-09-28
Attending: INTERNAL MEDICINE | Admitting: STUDENT IN AN ORGANIZED HEALTH CARE EDUCATION/TRAINING PROGRAM
Payer: MEDICARE

## 2021-09-24 VITALS
RESPIRATION RATE: 20 BRPM | TEMPERATURE: 98 F | HEIGHT: 64 IN | DIASTOLIC BLOOD PRESSURE: 82 MMHG | OXYGEN SATURATION: 96 % | SYSTOLIC BLOOD PRESSURE: 187 MMHG | HEART RATE: 70 BPM

## 2021-09-24 DIAGNOSIS — N39.0 URINARY TRACT INFECTION, SITE NOT SPECIFIED: ICD-10-CM

## 2021-09-24 LAB
ALBUMIN SERPL ELPH-MCNC: 4.2 G/DL — SIGNIFICANT CHANGE UP (ref 3.3–5)
ALP SERPL-CCNC: 136 U/L — HIGH (ref 40–120)
ALT FLD-CCNC: 16 U/L — SIGNIFICANT CHANGE UP (ref 10–45)
ANION GAP SERPL CALC-SCNC: 16 MMOL/L — SIGNIFICANT CHANGE UP (ref 5–17)
APPEARANCE UR: ABNORMAL
APTT BLD: 39.6 SEC — HIGH (ref 27.5–35.5)
AST SERPL-CCNC: 25 U/L — SIGNIFICANT CHANGE UP (ref 10–40)
BACTERIA # UR AUTO: ABNORMAL
BASOPHILS # BLD AUTO: 0.01 K/UL — SIGNIFICANT CHANGE UP (ref 0–0.2)
BASOPHILS NFR BLD AUTO: 0.2 % — SIGNIFICANT CHANGE UP (ref 0–2)
BILIRUB SERPL-MCNC: 0.6 MG/DL — SIGNIFICANT CHANGE UP (ref 0.2–1.2)
BILIRUB UR-MCNC: NEGATIVE — SIGNIFICANT CHANGE UP
BUN SERPL-MCNC: 16 MG/DL — SIGNIFICANT CHANGE UP (ref 7–23)
CALCIUM SERPL-MCNC: 10 MG/DL — SIGNIFICANT CHANGE UP (ref 8.4–10.5)
CHLORIDE SERPL-SCNC: 102 MMOL/L — SIGNIFICANT CHANGE UP (ref 96–108)
CO2 SERPL-SCNC: 22 MMOL/L — SIGNIFICANT CHANGE UP (ref 22–31)
COLOR SPEC: YELLOW — SIGNIFICANT CHANGE UP
CREAT SERPL-MCNC: 0.88 MG/DL — SIGNIFICANT CHANGE UP (ref 0.5–1.3)
DIFF PNL FLD: NEGATIVE — SIGNIFICANT CHANGE UP
EOSINOPHIL # BLD AUTO: 0.15 K/UL — SIGNIFICANT CHANGE UP (ref 0–0.5)
EOSINOPHIL NFR BLD AUTO: 2.4 % — SIGNIFICANT CHANGE UP (ref 0–6)
EPI CELLS # UR: 4 /HPF — SIGNIFICANT CHANGE UP
GAS PNL BLDV: SIGNIFICANT CHANGE UP
GLUCOSE SERPL-MCNC: 90 MG/DL — SIGNIFICANT CHANGE UP (ref 70–99)
GLUCOSE UR QL: NEGATIVE — SIGNIFICANT CHANGE UP
HCT VFR BLD CALC: 41.6 % — SIGNIFICANT CHANGE UP (ref 34.5–45)
HGB BLD-MCNC: 13.6 G/DL — SIGNIFICANT CHANGE UP (ref 11.5–15.5)
HYALINE CASTS # UR AUTO: 1 /LPF — SIGNIFICANT CHANGE UP (ref 0–2)
IMM GRANULOCYTES NFR BLD AUTO: 0.3 % — SIGNIFICANT CHANGE UP (ref 0–1.5)
INR BLD: 1.77 RATIO — HIGH (ref 0.88–1.16)
KETONES UR-MCNC: SIGNIFICANT CHANGE UP
LEUKOCYTE ESTERASE UR-ACNC: ABNORMAL
LYMPHOCYTES # BLD AUTO: 1.29 K/UL — SIGNIFICANT CHANGE UP (ref 1–3.3)
LYMPHOCYTES # BLD AUTO: 20.5 % — SIGNIFICANT CHANGE UP (ref 13–44)
MCHC RBC-ENTMCNC: 28.2 PG — SIGNIFICANT CHANGE UP (ref 27–34)
MCHC RBC-ENTMCNC: 32.7 GM/DL — SIGNIFICANT CHANGE UP (ref 32–36)
MCV RBC AUTO: 86.1 FL — SIGNIFICANT CHANGE UP (ref 80–100)
MONOCYTES # BLD AUTO: 0.57 K/UL — SIGNIFICANT CHANGE UP (ref 0–0.9)
MONOCYTES NFR BLD AUTO: 9.1 % — SIGNIFICANT CHANGE UP (ref 2–14)
NEUTROPHILS # BLD AUTO: 4.24 K/UL — SIGNIFICANT CHANGE UP (ref 1.8–7.4)
NEUTROPHILS NFR BLD AUTO: 67.5 % — SIGNIFICANT CHANGE UP (ref 43–77)
NITRITE UR-MCNC: POSITIVE
NRBC # BLD: 0 /100 WBCS — SIGNIFICANT CHANGE UP (ref 0–0)
PH UR: 6 — SIGNIFICANT CHANGE UP (ref 5–8)
PLATELET # BLD AUTO: 190 K/UL — SIGNIFICANT CHANGE UP (ref 150–400)
POTASSIUM SERPL-MCNC: 4.1 MMOL/L — SIGNIFICANT CHANGE UP (ref 3.5–5.3)
POTASSIUM SERPL-SCNC: 4.1 MMOL/L — SIGNIFICANT CHANGE UP (ref 3.5–5.3)
PROT SERPL-MCNC: 7.5 G/DL — SIGNIFICANT CHANGE UP (ref 6–8.3)
PROT UR-MCNC: ABNORMAL
PROTHROM AB SERPL-ACNC: 20.7 SEC — HIGH (ref 10.6–13.6)
RBC # BLD: 4.83 M/UL — SIGNIFICANT CHANGE UP (ref 3.8–5.2)
RBC # FLD: 15.1 % — HIGH (ref 10.3–14.5)
RBC CASTS # UR COMP ASSIST: 2 /HPF — SIGNIFICANT CHANGE UP (ref 0–4)
SODIUM SERPL-SCNC: 140 MMOL/L — SIGNIFICANT CHANGE UP (ref 135–145)
SP GR SPEC: 1.02 — SIGNIFICANT CHANGE UP (ref 1.01–1.02)
UROBILINOGEN FLD QL: NEGATIVE — SIGNIFICANT CHANGE UP
WBC # BLD: 6.28 K/UL — SIGNIFICANT CHANGE UP (ref 3.8–10.5)
WBC # FLD AUTO: 6.28 K/UL — SIGNIFICANT CHANGE UP (ref 3.8–10.5)
WBC UR QL: 18 /HPF — HIGH (ref 0–5)

## 2021-09-24 PROCEDURE — 99285 EMERGENCY DEPT VISIT HI MDM: CPT | Mod: 25,GC

## 2021-09-24 PROCEDURE — 93010 ELECTROCARDIOGRAM REPORT: CPT | Mod: GC

## 2021-09-24 PROCEDURE — 71045 X-RAY EXAM CHEST 1 VIEW: CPT | Mod: 26

## 2021-09-24 PROCEDURE — 70450 CT HEAD/BRAIN W/O DYE: CPT | Mod: 26,MA

## 2021-09-24 RX ORDER — SODIUM CHLORIDE 9 MG/ML
500 INJECTION INTRAMUSCULAR; INTRAVENOUS; SUBCUTANEOUS ONCE
Refills: 0 | Status: COMPLETED | OUTPATIENT
Start: 2021-09-24 | End: 2021-09-24

## 2021-09-24 RX ORDER — HALOPERIDOL DECANOATE 100 MG/ML
5 INJECTION INTRAMUSCULAR ONCE
Refills: 0 | Status: DISCONTINUED | OUTPATIENT
Start: 2021-09-24 | End: 2021-09-24

## 2021-09-24 RX ORDER — CEFTRIAXONE 500 MG/1
1000 INJECTION, POWDER, FOR SOLUTION INTRAMUSCULAR; INTRAVENOUS ONCE
Refills: 0 | Status: COMPLETED | OUTPATIENT
Start: 2021-09-24 | End: 2021-09-24

## 2021-09-24 RX ADMIN — SODIUM CHLORIDE 500 MILLILITER(S): 9 INJECTION INTRAMUSCULAR; INTRAVENOUS; SUBCUTANEOUS at 19:08

## 2021-09-24 RX ADMIN — CEFTRIAXONE 100 MILLIGRAM(S): 500 INJECTION, POWDER, FOR SOLUTION INTRAMUSCULAR; INTRAVENOUS at 21:09

## 2021-09-24 NOTE — ED PROVIDER NOTE - CLINICAL SUMMARY MEDICAL DECISION MAKING FREE TEXT BOX
89F w/ hx afib on coumadin, CVA, aspiration PNA, HTN presenting with AMS and not eating x1 day. UA+. CTH showed slight increased in ventricles, rec MRI brain. CXR neg. Started on ceftriaxone. Admit to med

## 2021-09-24 NOTE — ED ADULT NURSE NOTE - OBJECTIVE STATEMENT
89 year old female presented to ED from home via [facility] with daughter at bedside with c/o of decreased PO intake x1 week. as per daughter, pt had no PO intake today, reports 'she is more forgetful then usual'. hx dementia, CVA, a fib, HTN. pt a&ox0 at baseline, lung sounds clear, heart rate regular, abdomen soft nontender nondistended to palp. skin intact but pale, IV in right hand 20G and patent. pt shows no sign of pain, no sign of chest pain, no sign of shortness of breath, nausea/vomiting, numbness/tingling, fever/cough/chills, dizziness, headache. neuro intact. pt currently resting in bed comfortably with daughter at bedside. Bed in lowest position, side rails up for safety. Will continue to monitor and assess while offering support and reassurance.

## 2021-09-24 NOTE — ED PROVIDER NOTE - PHYSICAL EXAMINATION
GENERAL: NAD, laying in stretcher  HEAD:  Atraumatic, Normocephalic  EYES: PERRLA, conjunctiva and sclera clear  NECK: Supple  CHEST/LUNG: mild increased wob (per daughter normal when nervous), No wheeze, ronchi or rales  HEART: Regular rate and irregular rhythm  ABDOMEN: Soft, Nontender, Nondistended; Bowel sounds present  EXTREMITIES:  2+ Peripheral Pulses, No clubbing, cyanosis, or edema  PSYCH: AAOx0  SKIN: No rashes or lesions

## 2021-09-24 NOTE — ED PROVIDER NOTE - NSICDXPASTMEDICALHX_GEN_ALL_CORE_FT
PAST MEDICAL HISTORY:  Atrial fibrillation     CVA (cerebral infarction)     Gastritis     Hypertension     IBS (irritable bowel syndrome)     Smoking

## 2021-09-24 NOTE — ED PROVIDER NOTE - NS ED MD TWO NIGHTS YN
Mammogram ordered  Dr. Pereira's office will contact you to arrange for colonoscopy.  Used triamcinolone low-dose steroid cream twice a day to the areas on her legs.  The lesion you have an abdomen is a benign condition called seborrheic keratoses  Continue high iron foods as you been doing.  Return 6 months for annual exam.  Obtained CBC, renal panel, TSH, iron, TIBC, ferritin prior next visit.     Yes

## 2021-09-24 NOTE — ED PROVIDER NOTE - OBJECTIVE STATEMENT
89F w/ hx afib on coumadin, CVA, aspiration PNA, HTN presenting with AMS and not eating x1 day. Per aid at bedside, patient was usually a good eater but since this morning, patient refused to eat anything by mouth. Otherwise has no complaints per aid. Urinating well, no cough, fever, pain, n/v/d. Checks oxygen daily at home and has been in mid-high 90s. No recent travel or sick contacts. Patient is bedbound. Lives with aid at home.

## 2021-09-24 NOTE — ED ADULT NURSE NOTE - CHPI ED NUR SYMPTOMS NEG
no back pain/no chills/no dizziness/no fever/no headache/no loss of consciousness/no nausea/no pain/no vomiting

## 2021-09-24 NOTE — ED CLERICAL - NS ED CLERK NOTE PRE-ARRIVAL INFORMATION; ADDITIONAL PRE-ARRIVAL INFORMATION
CC/Reason For referral: Change in MS , Not eating, Afib, depression   Preferred Consultant(if applicable):  Who admits for you (if needed): hospitalist  Do you have documents you would like to fax over?  Would you still like to speak to an ED attending? Yes

## 2021-09-25 DIAGNOSIS — Z29.9 ENCOUNTER FOR PROPHYLACTIC MEASURES, UNSPECIFIED: ICD-10-CM

## 2021-09-25 DIAGNOSIS — R13.10 DYSPHAGIA, UNSPECIFIED: ICD-10-CM

## 2021-09-25 DIAGNOSIS — N39.0 URINARY TRACT INFECTION, SITE NOT SPECIFIED: ICD-10-CM

## 2021-09-25 DIAGNOSIS — I10 ESSENTIAL (PRIMARY) HYPERTENSION: ICD-10-CM

## 2021-09-25 DIAGNOSIS — G93.41 METABOLIC ENCEPHALOPATHY: ICD-10-CM

## 2021-09-25 DIAGNOSIS — I48.21 PERMANENT ATRIAL FIBRILLATION: ICD-10-CM

## 2021-09-25 DIAGNOSIS — F03.90 UNSPECIFIED DEMENTIA, UNSPECIFIED SEVERITY, WITHOUT BEHAVIORAL DISTURBANCE, PSYCHOTIC DISTURBANCE, MOOD DISTURBANCE, AND ANXIETY: ICD-10-CM

## 2021-09-25 LAB
ALBUMIN SERPL ELPH-MCNC: 3.8 G/DL — SIGNIFICANT CHANGE UP (ref 3.3–5)
ALP SERPL-CCNC: 115 U/L — SIGNIFICANT CHANGE UP (ref 40–120)
ALT FLD-CCNC: 12 U/L — SIGNIFICANT CHANGE UP (ref 10–45)
ANION GAP SERPL CALC-SCNC: 13 MMOL/L — SIGNIFICANT CHANGE UP (ref 5–17)
APTT BLD: 41.9 SEC — HIGH (ref 27.5–35.5)
AST SERPL-CCNC: 14 U/L — SIGNIFICANT CHANGE UP (ref 10–40)
BASOPHILS # BLD AUTO: 0 K/UL — SIGNIFICANT CHANGE UP (ref 0–0.2)
BASOPHILS NFR BLD AUTO: 0 % — SIGNIFICANT CHANGE UP (ref 0–2)
BILIRUB SERPL-MCNC: 0.5 MG/DL — SIGNIFICANT CHANGE UP (ref 0.2–1.2)
BUN SERPL-MCNC: 14 MG/DL — SIGNIFICANT CHANGE UP (ref 7–23)
CALCIUM SERPL-MCNC: 9.3 MG/DL — SIGNIFICANT CHANGE UP (ref 8.4–10.5)
CHLORIDE SERPL-SCNC: 102 MMOL/L — SIGNIFICANT CHANGE UP (ref 96–108)
CO2 SERPL-SCNC: 24 MMOL/L — SIGNIFICANT CHANGE UP (ref 22–31)
CREAT SERPL-MCNC: 0.93 MG/DL — SIGNIFICANT CHANGE UP (ref 0.5–1.3)
EOSINOPHIL # BLD AUTO: 0.16 K/UL — SIGNIFICANT CHANGE UP (ref 0–0.5)
EOSINOPHIL NFR BLD AUTO: 2.8 % — SIGNIFICANT CHANGE UP (ref 0–6)
GLUCOSE SERPL-MCNC: 92 MG/DL — SIGNIFICANT CHANGE UP (ref 70–99)
HCT VFR BLD CALC: 38.6 % — SIGNIFICANT CHANGE UP (ref 34.5–45)
HGB BLD-MCNC: 12.7 G/DL — SIGNIFICANT CHANGE UP (ref 11.5–15.5)
IMM GRANULOCYTES NFR BLD AUTO: 0.4 % — SIGNIFICANT CHANGE UP (ref 0–1.5)
INR BLD: 2.21 RATIO — HIGH (ref 0.88–1.16)
LYMPHOCYTES # BLD AUTO: 0.99 K/UL — LOW (ref 1–3.3)
LYMPHOCYTES # BLD AUTO: 17.6 % — SIGNIFICANT CHANGE UP (ref 13–44)
MAGNESIUM SERPL-MCNC: 1.9 MG/DL — SIGNIFICANT CHANGE UP (ref 1.6–2.6)
MCHC RBC-ENTMCNC: 28.2 PG — SIGNIFICANT CHANGE UP (ref 27–34)
MCHC RBC-ENTMCNC: 32.9 GM/DL — SIGNIFICANT CHANGE UP (ref 32–36)
MCV RBC AUTO: 85.8 FL — SIGNIFICANT CHANGE UP (ref 80–100)
MONOCYTES # BLD AUTO: 0.47 K/UL — SIGNIFICANT CHANGE UP (ref 0–0.9)
MONOCYTES NFR BLD AUTO: 8.3 % — SIGNIFICANT CHANGE UP (ref 2–14)
NEUTROPHILS # BLD AUTO: 4 K/UL — SIGNIFICANT CHANGE UP (ref 1.8–7.4)
NEUTROPHILS NFR BLD AUTO: 70.9 % — SIGNIFICANT CHANGE UP (ref 43–77)
NRBC # BLD: 0 /100 WBCS — SIGNIFICANT CHANGE UP (ref 0–0)
PHOSPHATE SERPL-MCNC: 3.2 MG/DL — SIGNIFICANT CHANGE UP (ref 2.5–4.5)
PLATELET # BLD AUTO: 176 K/UL — SIGNIFICANT CHANGE UP (ref 150–400)
POTASSIUM SERPL-MCNC: 3.8 MMOL/L — SIGNIFICANT CHANGE UP (ref 3.5–5.3)
POTASSIUM SERPL-SCNC: 3.8 MMOL/L — SIGNIFICANT CHANGE UP (ref 3.5–5.3)
PROT SERPL-MCNC: 6.4 G/DL — SIGNIFICANT CHANGE UP (ref 6–8.3)
PROTHROM AB SERPL-ACNC: 25.5 SEC — HIGH (ref 10.6–13.6)
RBC # BLD: 4.5 M/UL — SIGNIFICANT CHANGE UP (ref 3.8–5.2)
RBC # FLD: 15 % — HIGH (ref 10.3–14.5)
SARS-COV-2 RNA SPEC QL NAA+PROBE: SIGNIFICANT CHANGE UP
SODIUM SERPL-SCNC: 139 MMOL/L — SIGNIFICANT CHANGE UP (ref 135–145)
TSH SERPL-MCNC: 7.01 UIU/ML — HIGH (ref 0.27–4.2)
VIT B12 SERPL-MCNC: 407 PG/ML — SIGNIFICANT CHANGE UP (ref 232–1245)
WBC # BLD: 5.64 K/UL — SIGNIFICANT CHANGE UP (ref 3.8–10.5)
WBC # FLD AUTO: 5.64 K/UL — SIGNIFICANT CHANGE UP (ref 3.8–10.5)

## 2021-09-25 PROCEDURE — 12345: CPT | Mod: NC,GC

## 2021-09-25 PROCEDURE — 99223 1ST HOSP IP/OBS HIGH 75: CPT

## 2021-09-25 RX ORDER — ACETAMINOPHEN 500 MG
2 TABLET ORAL
Qty: 0 | Refills: 0 | DISCHARGE

## 2021-09-25 RX ORDER — ZIPRASIDONE HYDROCHLORIDE 20 MG/1
1 CAPSULE ORAL
Qty: 0 | Refills: 0 | DISCHARGE

## 2021-09-25 RX ORDER — WARFARIN SODIUM 2.5 MG/1
0.5 TABLET ORAL
Qty: 0 | Refills: 0 | DISCHARGE

## 2021-09-25 RX ORDER — WARFARIN SODIUM 2.5 MG/1
2 TABLET ORAL ONCE
Refills: 0 | Status: COMPLETED | OUTPATIENT
Start: 2021-09-25 | End: 2021-09-25

## 2021-09-25 RX ORDER — OLANZAPINE 15 MG/1
1 TABLET, FILM COATED ORAL
Qty: 0 | Refills: 0 | DISCHARGE

## 2021-09-25 RX ORDER — SERTRALINE 25 MG/1
1 TABLET, FILM COATED ORAL
Qty: 0 | Refills: 0 | DISCHARGE

## 2021-09-25 RX ORDER — IRBESARTAN 75 MG/1
1 TABLET ORAL
Qty: 0 | Refills: 0 | DISCHARGE

## 2021-09-25 RX ORDER — SERTRALINE 25 MG/1
25 TABLET, FILM COATED ORAL DAILY
Refills: 0 | Status: DISCONTINUED | OUTPATIENT
Start: 2021-09-25 | End: 2021-09-28

## 2021-09-25 RX ORDER — SERTRALINE 25 MG/1
50 TABLET, FILM COATED ORAL DAILY
Refills: 0 | Status: DISCONTINUED | OUTPATIENT
Start: 2021-09-25 | End: 2021-09-25

## 2021-09-25 RX ORDER — CHOLECALCIFEROL (VITAMIN D3) 125 MCG
1 CAPSULE ORAL
Qty: 0 | Refills: 0 | DISCHARGE

## 2021-09-25 RX ORDER — CEFTRIAXONE 500 MG/1
1000 INJECTION, POWDER, FOR SOLUTION INTRAMUSCULAR; INTRAVENOUS EVERY 24 HOURS
Refills: 0 | Status: DISCONTINUED | OUTPATIENT
Start: 2021-09-25 | End: 2021-09-28

## 2021-09-25 RX ADMIN — CEFTRIAXONE 100 MILLIGRAM(S): 500 INJECTION, POWDER, FOR SOLUTION INTRAMUSCULAR; INTRAVENOUS at 06:58

## 2021-09-25 RX ADMIN — CEFTRIAXONE 1000 MILLIGRAM(S): 500 INJECTION, POWDER, FOR SOLUTION INTRAMUSCULAR; INTRAVENOUS at 00:25

## 2021-09-25 RX ADMIN — SERTRALINE 25 MILLIGRAM(S): 25 TABLET, FILM COATED ORAL at 12:53

## 2021-09-25 RX ADMIN — WARFARIN SODIUM 2 MILLIGRAM(S): 2.5 TABLET ORAL at 23:30

## 2021-09-25 RX ADMIN — SODIUM CHLORIDE 500 MILLILITER(S): 9 INJECTION INTRAMUSCULAR; INTRAVENOUS; SUBCUTANEOUS at 00:25

## 2021-09-25 RX ADMIN — WARFARIN SODIUM 2 MILLIGRAM(S): 2.5 TABLET ORAL at 04:37

## 2021-09-25 NOTE — OCCUPATIONAL THERAPY INITIAL EVALUATION ADULT - LIVES WITH, PROFILE
Pt unable to provide social history, Hx obtained from private aide at bedside. Pt lives in private home with 24/7 live in aide assist (M-F; Sat/Sun - 2 separate aides). Pt dependent for all ADLs, bedbound, wheelchair bound at baseline, sohail lift for all transfers.

## 2021-09-25 NOTE — H&P ADULT - HISTORY OF PRESENT ILLNESS
89F c hx dementia c/b bedbound/dysphagia/minimally verbal, AF on coumadin, CVA, aspiration pneumonia, HTN, pw 2-3 days confusion and poor appetite.    History from aide at bedside. Unable to obtain any history from pt. At baseline, pt is able to make some of her needs known. Over the past 3 days, aide has noticed increased confusion. And over the past 1 day, has not been eating food. Aide denies that pt has had any diarrhea, cough, vomiting, or complaining of pain anywhere.    VS: Tm 97.9, P 76, /76, r 18, 95% ra  in the ED, received ceftriaxone, , haldol 5       89F c hx dementia c/b bedbound/dysphagia/speaks only a few words, AF on coumadin, CVA, aspiration pneumonia, HTN, pw 2-3 days confusion and poor appetite.    History from aide at bedside. Collateral obtained from pt's daughter Evette over phone. Unable to obtain any history from pt. At baseline, pt is able to make some of her needs known, answers questions appropriately with few word answers. Over the past 3 days, aide has noticed increased confusion. Daughter states pt has been speaking gibberish, which is not normal. And over the past 1 day, has not been eating any food. Aide denies that pt has had any diarrhea, cough, vomiting, or complaining of pain anywhere.    VS: Tm 97.9, P 76, /76, r 18, 95% ra  in the ED, received ceftriaxone, , haldol 5

## 2021-09-25 NOTE — H&P ADULT - NSHPSOCIALHISTORY_GEN_ALL_CORE
Social History:    Marital Status: (  ) , (  ) Single, (  ) , ( x ) , (  )   # of Children: 3  Lives with: ( x ) alone, (  ) children, (  ) spouse, (  ) parents, (  ) siblings, (  ) friends, (  ) other:   Occupation:     Substance Use/Illicit Drugs: (  ) never used vs other:   Tobacco Usage: ( x ) never smoked, (  ) former smoker, (  ) current smoker and Total Pack-Years:   Last Alcohol Usage/Frequency/Amount/Withdrawal/Hx of Abuse:  none  Foreign travel:   Animal exposure:

## 2021-09-25 NOTE — OCCUPATIONAL THERAPY INITIAL EVALUATION ADULT - ANTICIPATED DISCHARGE DISPOSITION, OT EVAL
Patient appears to be at baseline functional status, no acute OT services required at this time. return home with assist from 24/7 aides, family for all functional mobility and self care

## 2021-09-25 NOTE — H&P ADULT - NSHPPHYSICALEXAM_GEN_ALL_CORE
PHYSICAL EXAM:   GENERAL: Alert. +confused. No acute distress. Not thin. Not cachectic. Not obese.  HEAD:  Atraumatic. Normocephalic.  EYES: EOMI. Normal conjunctiva/sclera.  ENT: Neck supple. No JVD. Moist oral mucosa.   LYMPH: Normal supraclavicular/cervical lymph nodes.   CARDIAC: +irregularly irregular. S1. S2.   LUNG/CHEST: CTAB. BS equal bilaterally. No wheezes. No rales.   ABDOMEN: Soft. No distension. No fluid wave. Normal bowel sounds.  BACK: No midline/vertebral tenderness. No flank tenderness.  VASCULAR: +2 b/l radial or ulnar pulses. Palpable DP pulses.  EXTREMITIES:  No clubbing. No cyanosis. No edema. RUE and b/l LE contracted. LUE   NEUROLOGY: A&Ox0. unable to fully assess  PSYCH: unable to fully assess  SKIN: No jaundice. No erythema. No rash/lesion.  Vascular Access:     ICU Vital Signs Last 24 Hrs  T(C): 36.3 (25 Sep 2021 02:57), Max: 36.6 (24 Sep 2021 21:07)  T(F): 97.4 (25 Sep 2021 02:57), Max: 97.9 (24 Sep 2021 21:07)  HR: 82 (25 Sep 2021 02:57) (70 - 82)  BP: 155/90 (25 Sep 2021 02:57) (117/76 - 187/82)  BP(mean): --  ABP: --  ABP(mean): --  RR: 18 (25 Sep 2021 02:57) (16 - 20)  SpO2: 95% (25 Sep 2021 02:57) (95% - 98%)      I&O's Summary PHYSICAL EXAM:   GENERAL: Alert. +confused. No acute distress. Not thin. Not cachectic. Not obese.  HEAD:  Atraumatic. Normocephalic.  EYES: EOMI. Normal conjunctiva/sclera.  ENT: Neck supple. No JVD. Moist oral mucosa.   LYMPH: Normal supraclavicular/cervical lymph nodes.   CARDIAC: +irregularly irregular. S1. S2.   LUNG/CHEST: CTAB. BS equal bilaterally. No wheezes. No rales.   ABDOMEN: Soft. No distension. No fluid wave. Normal bowel sounds.  BACK: No midline/vertebral tenderness. No flank tenderness.  VASCULAR: +2 b/l radial or ulnar pulses. Palpable DP pulses.  EXTREMITIES:  No clubbing. No cyanosis. No edema. RUE and b/l LE contracted. LUE   NEUROLOGY: regards. A&Ox0. unable to fully assess  PSYCH: unable to fully assess  SKIN: No jaundice. No erythema. No rash/lesion.  Vascular Access:     ICU Vital Signs Last 24 Hrs  T(C): 36.3 (25 Sep 2021 02:57), Max: 36.6 (24 Sep 2021 21:07)  T(F): 97.4 (25 Sep 2021 02:57), Max: 97.9 (24 Sep 2021 21:07)  HR: 82 (25 Sep 2021 02:57) (70 - 82)  BP: 155/90 (25 Sep 2021 02:57) (117/76 - 187/82)  BP(mean): --  ABP: --  ABP(mean): --  RR: 18 (25 Sep 2021 02:57) (16 - 20)  SpO2: 95% (25 Sep 2021 02:57) (95% - 98%)      I&O's Summary

## 2021-09-25 NOTE — OCCUPATIONAL THERAPY INITIAL EVALUATION ADULT - EATING, PREVIOUS LEVEL OF FUNCTION, OT EVAL
aide assist; needed setup/initiation of utensil with assist to bring towards mouth for self feeding/needed assist

## 2021-09-25 NOTE — PROGRESS NOTE ADULT - PROBLEM SELECTOR PLAN 5
- coumadin 2 mg now. 2mg on MWFSa, 1mg on other days.  - not on rate control meds  - very high chadsvasc - coumadin 2mg on MWFSa, 1mg on other days.  - Daily coumadin order  - Daily INR  - not on rate control meds  - very high chadsvasc

## 2021-09-25 NOTE — H&P ADULT - PROBLEM SELECTOR PLAN 5
- cont coumadin 2 mg now  - not on rate control meds - coumadin 2 mg now. 2mg on MWFSa, 1mg on other days.  - not on rate control meds  - very high chadsvasc

## 2021-09-25 NOTE — H&P ADULT - NSHPLABSRESULTS_GEN_ALL_CORE
Personally reviewed old records.  Personally reviewed labs.  Personally reviewed imaging.  personnally reviewed ekg. afib rate 69, qtc 405. Q in III/V1/AVR. TWI in III                        13.6   6.28  )-----------( 190      ( 24 Sep 2021 19:02 )             41.6           140  |  102  |  16  ----------------------------<  90  4.1   |  22  |  0.88    Ca    10.0      24 Sep 2021 19:02    TPro  7.5  /  Alb  4.2  /  TBili  0.6  /  DBili  x   /  AST  25  /  ALT  16  /  AlkPhos  136<H>              LIVER FUNCTIONS - ( 24 Sep 2021 19:02 )  Alb: 4.2 g/dL / Pro: 7.5 g/dL / ALK PHOS: 136 U/L / ALT: 16 U/L / AST: 25 U/L / GGT: x             PT/INR - ( 24 Sep 2021 19:02 )   PT: 20.7 sec;   INR: 1.77 ratio         PTT - ( 24 Sep 2021 19:02 )  PTT:39.6 sec    Urinalysis Basic - ( 24 Sep 2021 19:25 )    Color: Yellow / Appearance: Slightly Turbid / S.021 / pH: x  Gluc: x / Ketone: Trace  / Bili: Negative / Urobili: Negative   Blood: x / Protein: Trace / Nitrite: Positive   Leuk Esterase: Large / RBC: 2 /hpf / WBC 18 /HPF   Sq Epi: x / Non Sq Epi: 4 /hpf / Bacteria: Many

## 2021-09-25 NOTE — H&P ADULT - ASSESSMENT
89F c hx dementia c/b bedbound/dysphagia/minimally verbal, AF on coumadin, CVA, aspiration pneumonia, HTN, pw acute metabolic encephalopathy likely 2/2 UTI 89F c hx dementia c/b bedbound/dysphagia/speaks only a few words, AF on coumadin, CVA, aspiration pneumonia, HTN, pw acute metabolic encephalopathy likely 2/2 UTI

## 2021-09-25 NOTE — OCCUPATIONAL THERAPY INITIAL EVALUATION ADULT - GENERAL OBSERVATIONS, REHAB EVAL
Pt received semisupine in bed, A+Ox0 (minimal verbal, requires mod/max tactile and verbal cues for arousal and response) private aide at bedside, bilateral CAIR boots donned. bilateral hearing loss (per aide hearing aides at home), +dysphagia

## 2021-09-25 NOTE — H&P ADULT - PROBLEM SELECTOR PLAN 3
- pt's aide thinks james, daughter, is primary decision maker - both of pt's 2 daughters are HCP  - OT eval once cognitively improved  - cont sertraline

## 2021-09-25 NOTE — OCCUPATIONAL THERAPY INITIAL EVALUATION ADULT - PERTINENT HX OF CURRENT PROBLEM, REHAB EVAL
89yo F PMHx dementia c/b bedbound/dysphagia/speaks only a few words, AF on coumadin, CVA, aspiration pneumonia, HTN, pw 2-3 days confusion and poor appetite. History from aide at bedside. Collateral obtained from pt's daughter Evette over phone. Unable to obtain any history from pt.

## 2021-09-25 NOTE — OCCUPATIONAL THERAPY INITIAL EVALUATION ADULT - PRECAUTIONS/LIMITATIONS, REHAB EVAL
At baseline, pt is able to make some of her needs known, answers questions appropriately with few word answers. Over the past 3 days, aide has noticed increased confusion. Daughter states pt has been speaking gibberish, which is not normal. And over the past 1 day, has not been eating any food.Aide denies that pt has had any diarrhea, cough, vomiting, or complaining of pain anywhere./fall precautions

## 2021-09-25 NOTE — PROGRESS NOTE ADULT - SUBJECTIVE AND OBJECTIVE BOX
Subjective:    INTERVAL HPI/OVERNIGHT EVENTS:   No acute events overnight  Afebrile, hemodynamically stable     T(F): 97.4 (21 @ 02:57), Max: 97.9 (21 @ 21:07)  HR: 82 (21 @ 02:57) (70 - 82)  BP: 155/90 (21 @ 02:57) (117/76 - 187/82)  RR: 18 (21 @ 02:57) (16 - 20)  SpO2: 95% (21 @ 02:57) (95% - 98%)  I&O's Summary    24 Sep 2021 07:01  -  25 Sep 2021 07:00  --------------------------------------------------------  IN: 50 mL / OUT: 0 mL / NET: 50 mL    Weight (kg): 65 (21 @ 02:57)    Medications:  cefTRIAXone   IVPB 1000 milliGRAM(s) IV Intermittent every 24 hours  sertraline 25 milliGRAM(s) Oral daily    PHYSICAL EXAM:  GENERAL: Patient laying in bed, in no acute distress.   HEAD:  Normocephalic, Atraumatic.  EYES: EOMI, PERRLA, conjunctiva and sclera clear  NECK: Supple, No JVD, Normal thyroid, no enlarged nodes  NERVOUS SYSTEM:  Alert & Awake. No gross motor deficits.   CHEST/LUNG: CTAB. No rales, rhonchi, or wheezes appreciated  HEART: Irr Irr rhythm. Regular rate.  ABDOMEN: Soft, Nontender, Nondistended; Normal bowel sounds.  EXTREMITIES: 2+ Peripheral Pulses, No clubbing, cyanosis, or edema    Notable Labs:    Labs:                          12.7   5.64  )-----------( 176      ( 25 Sep 2021 09:22 )             38.6         139  |  102  |  14  ----------------------------<  92  3.8   |  24  |  0.93    Ca    9.3      25 Sep 2021 09:22  Phos  3.2       Mg     1.9         TPro  6.4  /  Alb  3.8  /  TBili  0.5  /  DBili  x   /  AST  14  /  ALT  12  /  AlkPhos  115      LIVER FUNCTIONS - ( 25 Sep 2021 09:22 )  Alb: 3.8 g/dL / Pro: 6.4 g/dL / ALK PHOS: 115 U/L / ALT: 12 U/L / AST: 14 U/L / GGT: x           PT/INR - ( 25 Sep 2021 09:22 )   PT: 25.5 sec;   INR: 2.21 ratio         PTT - ( 25 Sep 2021 09:22 )  PTT:41.9 sec  CAPILLARY BLOOD GLUCOSE      POCT Blood Glucose.: 87 mg/dL (24 Sep 2021 18:21)    Urinalysis Basic - ( 24 Sep 2021 19:25 )    Color: Yellow / Appearance: Slightly Turbid / S.021 / pH: x  Gluc: x / Ketone: Trace  / Bili: Negative / Urobili: Negative   Blood: x / Protein: Trace / Nitrite: Positive   Leuk Esterase: Large / RBC: 2 /hpf / WBC 18 /HPF   Sq Epi: x / Non Sq Epi: 4 /hpf / Bacteria: Many    Micro:  UCx pending    RADIOLOGY & ADDITIONAL TESTS:   NNI   Subjective:    INTERVAL HPI/OVERNIGHT EVENTS:   - Patient not tolerating PO well. Usually loves eating as per HHA ad bedside  - Patient more somnolent than baseline  No acute events overnight  Afebrile, hemodynamically stable     T(F): 97.4 (21 @ 02:57), Max: 97.9 (21 @ 21:07)  HR: 82 (21 @ 02:57) (70 - 82)  BP: 155/90 (21 @ 02:57) (117/76 - 187/82)  RR: 18 (21 @ 02:57) (16 - 20)  SpO2: 95% (21 @ 02:57) (95% - 98%)  I&O's Summary    24 Sep 2021 07:01  -  25 Sep 2021 07:00  --------------------------------------------------------  IN: 50 mL / OUT: 0 mL / NET: 50 mL    Weight (kg): 65 (21 @ 02:57)    Medications:  cefTRIAXone   IVPB 1000 milliGRAM(s) IV Intermittent every 24 hours  sertraline 25 milliGRAM(s) Oral daily    PHYSICAL EXAM:  GENERAL: Patient laying in bed, in no acute distress, very somnolent.  HEAD:  Normocephalic, Atraumatic.  EYES: EOMI, PERRLA, conjunctiva and sclera clear  NECK: Supple, No JVD, Normal thyroid, no enlarged nodes  NERVOUS SYSTEM:  Somnolent.   CHEST/LUNG: CTAB. No rales, rhonchi, or wheezes appreciated  HEART: Irr Irr rhythm. Regular rate.  ABDOMEN: Soft, Nontender, Nondistended; Normal bowel sounds.  EXTREMITIES: 2+ Peripheral Pulses, No clubbing, cyanosis, or edema    Notable Labs:    Labs:                          12.7   5.64  )-----------( 176      ( 25 Sep 2021 09:22 )             38.6         139  |  102  |  14  ----------------------------<  92  3.8   |  24  |  0.93    Ca    9.3      25 Sep 2021 09:22  Phos  3.2       Mg     1.9         TPro  6.4  /  Alb  3.8  /  TBili  0.5  /  DBili  x   /  AST  14  /  ALT  12  /  AlkPhos  115      LIVER FUNCTIONS - ( 25 Sep 2021 09:22 )  Alb: 3.8 g/dL / Pro: 6.4 g/dL / ALK PHOS: 115 U/L / ALT: 12 U/L / AST: 14 U/L / GGT: x           PT/INR - ( 25 Sep 2021 09:22 )   PT: 25.5 sec;   INR: 2.21 ratio       PTT - ( 25 Sep 2021 09:22 )  PTT:41.9 sec  CAPILLARY BLOOD GLUCOSE    POCT Blood Glucose.: 87 mg/dL (24 Sep 2021 18:21)    Urinalysis Basic - ( 24 Sep 2021 19:25 )    Color: Yellow / Appearance: Slightly Turbid / S.021 / pH: x  Gluc: x / Ketone: Trace  / Bili: Negative / Urobili: Negative   Blood: x / Protein: Trace / Nitrite: Positive   Leuk Esterase: Large / RBC: 2 /hpf / WBC 18 /HPF   Sq Epi: x / Non Sq Epi: 4 /hpf / Bacteria: Many    Micro:  UCx pending    RADIOLOGY & ADDITIONAL TESTS:   NNI

## 2021-09-25 NOTE — PROGRESS NOTE ADULT - PROBLEM SELECTOR PLAN 1
- cont ceftriaxone  - f/u ur cx Afebrile, normotensive-hypertensive, AMS more somnolent than baseline per HHA.   - cont ceftriaxone  - f/u ucx, adjust abx if warranted  - Monitor for clinical improvement within 48h of abx

## 2021-09-25 NOTE — PROGRESS NOTE ADULT - PROBLEM SELECTOR PLAN 2
- most likely 2/2 above UTI  - CTH showing mild increase in ventricle size compared to prior  - if persistent confusion despite abx, would work up other causes including hydrocephalus

## 2021-09-25 NOTE — H&P ADULT - PROBLEM SELECTOR PLAN 4
- start dysphagia 1 with nectar thickened  - aspiration precaution - start dysphagia 1 with nectar thickened  - aspiration precaution, hob elevation

## 2021-09-26 LAB
ANION GAP SERPL CALC-SCNC: 17 MMOL/L — SIGNIFICANT CHANGE UP (ref 5–17)
APTT BLD: 41.3 SEC — HIGH (ref 27.5–35.5)
BUN SERPL-MCNC: 17 MG/DL — SIGNIFICANT CHANGE UP (ref 7–23)
CALCIUM SERPL-MCNC: 9.4 MG/DL — SIGNIFICANT CHANGE UP (ref 8.4–10.5)
CHLORIDE SERPL-SCNC: 104 MMOL/L — SIGNIFICANT CHANGE UP (ref 96–108)
CO2 SERPL-SCNC: 19 MMOL/L — LOW (ref 22–31)
COVID-19 SPIKE DOMAIN AB INTERP: POSITIVE
COVID-19 SPIKE DOMAIN ANTIBODY RESULT: >250 U/ML — HIGH
CREAT SERPL-MCNC: 0.91 MG/DL — SIGNIFICANT CHANGE UP (ref 0.5–1.3)
GLUCOSE SERPL-MCNC: 80 MG/DL — SIGNIFICANT CHANGE UP (ref 70–99)
HCT VFR BLD CALC: 39.2 % — SIGNIFICANT CHANGE UP (ref 34.5–45)
HGB BLD-MCNC: 12.4 G/DL — SIGNIFICANT CHANGE UP (ref 11.5–15.5)
INR BLD: 2.95 RATIO — HIGH (ref 0.88–1.16)
MAGNESIUM SERPL-MCNC: 1.8 MG/DL — SIGNIFICANT CHANGE UP (ref 1.6–2.6)
MCHC RBC-ENTMCNC: 27.9 PG — SIGNIFICANT CHANGE UP (ref 27–34)
MCHC RBC-ENTMCNC: 31.6 GM/DL — LOW (ref 32–36)
MCV RBC AUTO: 88.3 FL — SIGNIFICANT CHANGE UP (ref 80–100)
NRBC # BLD: 0 /100 WBCS — SIGNIFICANT CHANGE UP (ref 0–0)
PHOSPHATE SERPL-MCNC: 3.5 MG/DL — SIGNIFICANT CHANGE UP (ref 2.5–4.5)
PLATELET # BLD AUTO: 173 K/UL — SIGNIFICANT CHANGE UP (ref 150–400)
POTASSIUM SERPL-MCNC: 4 MMOL/L — SIGNIFICANT CHANGE UP (ref 3.5–5.3)
POTASSIUM SERPL-SCNC: 4 MMOL/L — SIGNIFICANT CHANGE UP (ref 3.5–5.3)
PROTHROM AB SERPL-ACNC: 33.6 SEC — HIGH (ref 10.6–13.6)
RBC # BLD: 4.44 M/UL — SIGNIFICANT CHANGE UP (ref 3.8–5.2)
RBC # FLD: 15.1 % — HIGH (ref 10.3–14.5)
SARS-COV-2 IGG+IGM SERPL QL IA: >250 U/ML — HIGH
SARS-COV-2 IGG+IGM SERPL QL IA: POSITIVE
SODIUM SERPL-SCNC: 140 MMOL/L — SIGNIFICANT CHANGE UP (ref 135–145)
WBC # BLD: 6.8 K/UL — SIGNIFICANT CHANGE UP (ref 3.8–10.5)
WBC # FLD AUTO: 6.8 K/UL — SIGNIFICANT CHANGE UP (ref 3.8–10.5)

## 2021-09-26 PROCEDURE — 99233 SBSQ HOSP IP/OBS HIGH 50: CPT | Mod: GC

## 2021-09-26 RX ORDER — WARFARIN SODIUM 2.5 MG/1
1 TABLET ORAL ONCE
Refills: 0 | Status: COMPLETED | OUTPATIENT
Start: 2021-09-26 | End: 2021-09-26

## 2021-09-26 RX ORDER — SODIUM CHLORIDE 9 MG/ML
1000 INJECTION, SOLUTION INTRAVENOUS
Refills: 0 | Status: DISCONTINUED | OUTPATIENT
Start: 2021-09-26 | End: 2021-09-28

## 2021-09-26 RX ADMIN — WARFARIN SODIUM 1 MILLIGRAM(S): 2.5 TABLET ORAL at 21:19

## 2021-09-26 RX ADMIN — SODIUM CHLORIDE 75 MILLILITER(S): 9 INJECTION, SOLUTION INTRAVENOUS at 21:19

## 2021-09-26 RX ADMIN — SODIUM CHLORIDE 75 MILLILITER(S): 9 INJECTION, SOLUTION INTRAVENOUS at 13:09

## 2021-09-26 RX ADMIN — SERTRALINE 25 MILLIGRAM(S): 25 TABLET, FILM COATED ORAL at 13:09

## 2021-09-26 RX ADMIN — CEFTRIAXONE 100 MILLIGRAM(S): 500 INJECTION, POWDER, FOR SOLUTION INTRAMUSCULAR; INTRAVENOUS at 06:21

## 2021-09-26 NOTE — PROGRESS NOTE ADULT - SUBJECTIVE AND OBJECTIVE BOX
PROGRESS NOTE:   Authored by Ryan Louis MD, PGY1 [facility] Pager [phone] [facility] pager [phone]    Patient is a 89y old  Female who presents with a chief complaint of confusion (25 Sep 2021 10:16)      SUBJECTIVE / OVERNIGHT EVENTS:     REVIEW OF SYSTEMS:    CONSTITUTIONAL: No weakness, fevers or chills  EYES/ENT: No visual changes;  No vertigo or throat pain   NECK: No pain or stiffness  RESPIRATORY: No cough, wheezing, hemoptysis; No shortness of breath  CARDIOVASCULAR: No chest pain or palpitations  GASTROINTESTINAL: No abdominal or epigastric pain. No nausea, vomiting, or hematemesis; No diarrhea or constipation. No melena or hematochezia.  GENITOURINARY: No dysuria, frequency or hematuria  NEUROLOGICAL: No numbness or weakness  SKIN: No itching, rashes    MEDICATIONS  (STANDING):  cefTRIAXone   IVPB 1000 milliGRAM(s) IV Intermittent every 24 hours  sertraline 25 milliGRAM(s) Oral daily    MEDICATIONS  (PRN):      CAPILLARY BLOOD GLUCOSE        I&O's Summary    24 Sep 2021 07:01  -  25 Sep 2021 07:00  --------------------------------------------------------  IN: 50 mL / OUT: 0 mL / NET: 50 mL    25 Sep 2021 07:01  -  26 Sep 2021 06:14  --------------------------------------------------------  IN: 0 mL / OUT: 150 mL / NET: -150 mL        PHYSICAL EXAM:  Vital Signs Last 24 Hrs  T(C): 36.8 (26 Sep 2021 04:59), Max: 37.1 (25 Sep 2021 21:14)  T(F): 98.2 (26 Sep 2021 04:59), Max: 98.7 (25 Sep 2021 21:14)  HR: 85 (26 Sep 2021 04:59) (67 - 86)  BP: 160/79 (26 Sep 2021 04:59) (142/70 - 160/88)  BP(mean): --  RR: 18 (26 Sep 2021 04:59) (18 - 18)  SpO2: 94% (26 Sep 2021 04:59) (94% - 98%)    CONSTITUTIONAL: NAD, well-developed  RESPIRATORY: Normal respiratory effort; lungs are clear to auscultation bilaterally  CARDIOVASCULAR: Regular rate and rhythm, normal S1 and S2, no murmur/rub/gallop; No lower extremity edema; Peripheral pulses are 2+ bilaterally  ABDOMEN: Nontender to palpation, normoactive bowel sounds, no rebound/guarding; No hepatosplenomegaly  MUSCLOSKELETAL: no clubbing or cyanosis of digits; no joint swelling or tenderness to palpation  PSYCH: A+O to person, place, and time; affect appropriate  NEURO: Non-focal, no tremors  SKIN: No rashes    LABS:                        12.7   5.64  )-----------( 176      ( 25 Sep 2021 09:22 )             38.6     09-25    139  |  102  |  14  ----------------------------<  92  3.8   |  24  |  0.93    Ca    9.3      25 Sep 2021 09:22  Phos  3.2     09-25  Mg     1.9     -25    TPro  6.4  /  Alb  3.8  /  TBili  0.5  /  DBili  x   /  AST  14  /  ALT  12  /  AlkPhos  115  09-25    PT/INR - ( 25 Sep 2021 09:22 )   PT: 25.5 sec;   INR: 2.21 ratio         PTT - ( 25 Sep 2021 09:22 )  PTT:41.9 sec      Urinalysis Basic - ( 24 Sep 2021 19:25 )    Color: Yellow / Appearance: Slightly Turbid / S.021 / pH: x  Gluc: x / Ketone: Trace  / Bili: Negative / Urobili: Negative   Blood: x / Protein: Trace / Nitrite: Positive   Leuk Esterase: Large / RBC: 2 /hpf / WBC 18 /HPF   Sq Epi: x / Non Sq Epi: 4 /hpf / Bacteria: Many          RADIOLOGY & ADDITIONAL TESTS:  No new imaging or tests    COORDINATION OF CARE:  Care Discussed with Consultants/Other Providers [Y/N]:  Prior or Outpatient Records Reviewed [Y/N]:

## 2021-09-26 NOTE — PROGRESS NOTE ADULT - PROBLEM SELECTOR PLAN 1
Afebrile, normotensive-hypertensive, AMS more somnolent than baseline per HHA.   - cont ceftriaxone  - f/u ucx, adjust abx if warranted  - Monitor for clinical improvement within 48h of abx

## 2021-09-26 NOTE — PROGRESS NOTE ADULT - PROBLEM SELECTOR PLAN 5
- coumadin 2mg on MWFSa, 1mg on other days.  - Daily coumadin order  - Daily INR  - not on rate control meds  - very high chadsvasc

## 2021-09-27 DIAGNOSIS — Z51.5 ENCOUNTER FOR PALLIATIVE CARE: ICD-10-CM

## 2021-09-27 DIAGNOSIS — F03.90 UNSPECIFIED DEMENTIA WITHOUT BEHAVIORAL DISTURBANCE: ICD-10-CM

## 2021-09-27 DIAGNOSIS — Z71.89 OTHER SPECIFIED COUNSELING: ICD-10-CM

## 2021-09-27 LAB
-  AMIKACIN: SIGNIFICANT CHANGE UP
-  AMOXICILLIN/CLAVULANIC ACID: SIGNIFICANT CHANGE UP
-  AMPICILLIN/SULBACTAM: SIGNIFICANT CHANGE UP
-  AMPICILLIN: SIGNIFICANT CHANGE UP
-  AZTREONAM: SIGNIFICANT CHANGE UP
-  CEFAZOLIN: SIGNIFICANT CHANGE UP
-  CEFEPIME: SIGNIFICANT CHANGE UP
-  CEFOXITIN: SIGNIFICANT CHANGE UP
-  CEFTRIAXONE: SIGNIFICANT CHANGE UP
-  CIPROFLOXACIN: SIGNIFICANT CHANGE UP
-  ERTAPENEM: SIGNIFICANT CHANGE UP
-  GENTAMICIN: SIGNIFICANT CHANGE UP
-  IMIPENEM: SIGNIFICANT CHANGE UP
-  LEVOFLOXACIN: SIGNIFICANT CHANGE UP
-  MEROPENEM: SIGNIFICANT CHANGE UP
-  NITROFURANTOIN: SIGNIFICANT CHANGE UP
-  PIPERACILLIN/TAZOBACTAM: SIGNIFICANT CHANGE UP
-  TIGECYCLINE: SIGNIFICANT CHANGE UP
-  TOBRAMYCIN: SIGNIFICANT CHANGE UP
-  TRIMETHOPRIM/SULFAMETHOXAZOLE: SIGNIFICANT CHANGE UP
ANION GAP SERPL CALC-SCNC: 21 MMOL/L — HIGH (ref 5–17)
APTT BLD: 50.5 SEC — HIGH (ref 27.5–35.5)
APTT BLD: 52.5 SEC — HIGH (ref 27.5–35.5)
BUN SERPL-MCNC: 18 MG/DL — SIGNIFICANT CHANGE UP (ref 7–23)
CALCIUM SERPL-MCNC: 9.2 MG/DL — SIGNIFICANT CHANGE UP (ref 8.4–10.5)
CHLORIDE SERPL-SCNC: 102 MMOL/L — SIGNIFICANT CHANGE UP (ref 96–108)
CO2 SERPL-SCNC: 17 MMOL/L — LOW (ref 22–31)
CREAT SERPL-MCNC: 0.86 MG/DL — SIGNIFICANT CHANGE UP (ref 0.5–1.3)
CULTURE RESULTS: SIGNIFICANT CHANGE UP
GLUCOSE SERPL-MCNC: 79 MG/DL — SIGNIFICANT CHANGE UP (ref 70–99)
HCT VFR BLD CALC: 35.7 % — SIGNIFICANT CHANGE UP (ref 34.5–45)
HGB BLD-MCNC: 11.9 G/DL — SIGNIFICANT CHANGE UP (ref 11.5–15.5)
INR BLD: 5.53 RATIO — CRITICAL HIGH (ref 0.88–1.16)
INR BLD: 5.64 RATIO — CRITICAL HIGH (ref 0.88–1.16)
MAGNESIUM SERPL-MCNC: 1.8 MG/DL — SIGNIFICANT CHANGE UP (ref 1.6–2.6)
MCHC RBC-ENTMCNC: 28.3 PG — SIGNIFICANT CHANGE UP (ref 27–34)
MCHC RBC-ENTMCNC: 33.3 GM/DL — SIGNIFICANT CHANGE UP (ref 32–36)
MCV RBC AUTO: 85 FL — SIGNIFICANT CHANGE UP (ref 80–100)
METHOD TYPE: SIGNIFICANT CHANGE UP
NRBC # BLD: 0 /100 WBCS — SIGNIFICANT CHANGE UP (ref 0–0)
ORGANISM # SPEC MICROSCOPIC CNT: SIGNIFICANT CHANGE UP
ORGANISM # SPEC MICROSCOPIC CNT: SIGNIFICANT CHANGE UP
PHOSPHATE SERPL-MCNC: 3 MG/DL — SIGNIFICANT CHANGE UP (ref 2.5–4.5)
PLATELET # BLD AUTO: 175 K/UL — SIGNIFICANT CHANGE UP (ref 150–400)
POTASSIUM SERPL-MCNC: 3.5 MMOL/L — SIGNIFICANT CHANGE UP (ref 3.5–5.3)
POTASSIUM SERPL-SCNC: 3.5 MMOL/L — SIGNIFICANT CHANGE UP (ref 3.5–5.3)
PROTHROM AB SERPL-ACNC: 61.2 SEC — HIGH (ref 10.6–13.6)
PROTHROM AB SERPL-ACNC: 62.3 SEC — HIGH (ref 10.6–13.6)
RBC # BLD: 4.2 M/UL — SIGNIFICANT CHANGE UP (ref 3.8–5.2)
RBC # FLD: 14.8 % — HIGH (ref 10.3–14.5)
SODIUM SERPL-SCNC: 140 MMOL/L — SIGNIFICANT CHANGE UP (ref 135–145)
SPECIMEN SOURCE: SIGNIFICANT CHANGE UP
WBC # BLD: 6.59 K/UL — SIGNIFICANT CHANGE UP (ref 3.8–10.5)
WBC # FLD AUTO: 6.59 K/UL — SIGNIFICANT CHANGE UP (ref 3.8–10.5)

## 2021-09-27 PROCEDURE — 99497 ADVNCD CARE PLAN 30 MIN: CPT | Mod: 25

## 2021-09-27 PROCEDURE — 99233 SBSQ HOSP IP/OBS HIGH 50: CPT | Mod: GC

## 2021-09-27 PROCEDURE — 99223 1ST HOSP IP/OBS HIGH 75: CPT

## 2021-09-27 RX ORDER — LEVOTHYROXINE SODIUM 125 MCG
25 TABLET ORAL DAILY
Refills: 0 | Status: DISCONTINUED | OUTPATIENT
Start: 2021-09-27 | End: 2021-09-28

## 2021-09-27 RX ADMIN — SODIUM CHLORIDE 75 MILLILITER(S): 9 INJECTION, SOLUTION INTRAVENOUS at 06:27

## 2021-09-27 RX ADMIN — SERTRALINE 25 MILLIGRAM(S): 25 TABLET, FILM COATED ORAL at 14:00

## 2021-09-27 RX ADMIN — CEFTRIAXONE 100 MILLIGRAM(S): 500 INJECTION, POWDER, FOR SOLUTION INTRAMUSCULAR; INTRAVENOUS at 06:27

## 2021-09-27 NOTE — CONSULT NOTE ADULT - CONSULT REASON
dementia, bed-bound, here w/ AMS from baseline, FTT, treated for UTI w/o major improvement. family would like to discuss end-of-life options

## 2021-09-27 NOTE — PROGRESS NOTE ADULT - PROBLEM SELECTOR PLAN 3
With new poor PO intake  Possibly end stage dementia  - Pal care consulted, daughter agreeable to consult. Will f/u pal care recs. Will consider if qualifies for hospice   - both of pt's 2 daughters Evette and Randa are HCP  - OT eval once cognitively improved  - cont sertraline

## 2021-09-27 NOTE — CONSULT NOTE ADULT - PROBLEM SELECTOR RECOMMENDATION 9
/Delirium. Multifactorial (2/2 to infection, ?? CVA, ?? Hydrocephalus vs. progression of Dementia)   Work up and Rx as per the primary team.   GOC: I d/w the patient's daughter about the patient's illness and current clinical status. The patient's HCP believed that beyond having a UTI, the patient was probably having a CVA that was not able to be detected on her most recent CT so she was suggesting a MRI. I validated the HCP's concerns but indicated  that performing a MRI to confirm a CVA was not useful since it was not going to change the current plan of care. We then discussed about CT finding suggesting hydrocephalus and how even if it were to be a factor contributing to the patient's worsening mental status that it was unlikely to change her overall level of advanced cognitive impairment ( as per her HCP, during the last month, the patient was able to say only 1-2 words and was not able to smile). I indicated that, at this point, plan is for continuing Abx, following up sensitivity, and considering further work and/or consultation (e.g., NeuroSx) depending on the patient's clinical status and GOC.   We also discussed about the patient's dysphagia and how it may be a natural part of her advanced illness process. I also indicated that, understanding her advanced disease, careful hand feeding/pleasure feeds (If possible) were recommended over NGT feeds.   After my d/w the patient's daughters they stated their feeling was that the patient was entering her EOL process. We discussed about hospice care and they agree with a hospice referral (likely for home hospice).  16' were spent in ACP.

## 2021-09-27 NOTE — PROGRESS NOTE ADULT - PROBLEM SELECTOR PROBLEM 5
Permanent atrial fibrillation Mastoid Interpolation Flap Text: A decision was made to reconstruct the defect utilizing an interpolation axial flap and a staged reconstruction.  A telfa template was made of the defect.  This telfa template was then used to outline the mastoid interpolation flap.  The donor area for the pedicle flap was then injected with anesthesia.  The flap was excised through the skin and subcutaneous tissue down to the layer of the underlying musculature.  The pedicle flap was carefully excised within this deep plane to maintain its blood supply.  The edges of the donor site were undermined.   The donor site was closed in a primary fashion.  The pedicle was then rotated into position and sutured.  Once the tube was sutured into place, adequate blood supply was confirmed with blanching and refill.  The pedicle was then wrapped with xeroform gauze and dressed appropriately with a telfa and gauze bandage to ensure continued blood supply and protect the attached pedicle.

## 2021-09-27 NOTE — PROGRESS NOTE ADULT - PROBLEM SELECTOR PLAN 5
INR high 5.64 on 9/27  - coumadin 2mg on MWFSa, 1mg on other days.  - Daily coumadin order  - Daily INR  - not on rate control meds  - very high chadsvasc

## 2021-09-27 NOTE — PROGRESS NOTE ADULT - SUBJECTIVE AND OBJECTIVE BOX
Subjective:    INTERVAL HPI/OVERNIGHT EVENTS:   - Patient somnolent this AM, not following commands or responding.   - Daughter, Evette, at bedside. Says patient was able to eat small cheesecake last night  - Otherwise very poor PO intake. On IV fluids  - Per daughter, had one episode of sheet-grabbing / agitation yesterday.  - on day 3 of CTX, no obvious clinical improvement  - Daughter evette aware this may be a sign of end-stage dementia. Patient agreeable to Palliative care consult and if necessary hospice evaluation.   - Unable to obtain ROS due to mental status  No acute events overnight  Afebrile, hemodynamically stable     T(F): 98.5 (09-27-21 @ 05:13), Max: 98.5 (09-27-21 @ 05:13)  HR: 82 (09-27-21 @ 05:13) (75 - 82)  BP: 141/70 (09-27-21 @ 05:13) (141/70 - 154/78)  RR: 18 (09-27-21 @ 05:13) (18 - 18)  SpO2: 94% (09-27-21 @ 05:13) (94% - 97%)  I&O's Summary    26 Sep 2021 07:01  -  27 Sep 2021 07:00  --------------------------------------------------------  IN: 1250 mL / OUT: 0 mL / NET: 1250 mL      Weight (kg): 65 (09-25-21 @ 02:57)    Medications:  cefTRIAXone   IVPB 1000 milliGRAM(s) IV Intermittent every 24 hours  lactated ringers. 1000 milliLiter(s) (75 mL/Hr) IV Continuous <Continuous>  levothyroxine 25 MICROGram(s) Oral daily  sertraline 25 milliGRAM(s) Oral daily    PHYSICAL EXAM:  GENERAL: Patient laying in bed, in no acute distress, very somnolent. A&Ox0. Not following commands, not responding to questions.   HEAD:  Normocephalic, Atraumatic.  EYES: EOMI, PERRLA, conjunctiva and sclera clear  NECK: Supple, No JVD, Normal thyroid, no enlarged nodes  NERVOUS SYSTEM:  Somnolent. Cannot assess motor/sensory neurologic function.   CHEST/LUNG: CTAB. No rales, rhonchi, or wheezes appreciated  HEART: Irr Irr rhythm. Regular rate.  ABDOMEN: Soft, Nontender, Nondistended; Normal bowel sounds.  EXTREMITIES: 2+ Peripheral Pulses, No clubbing, cyanosis, or edema    Notable Labs:    Labs:                          11.9   6.59  )-----------( 175      ( 27 Sep 2021 07:09 )             35.7     09-27    140  |  102  |  18  ----------------------------<  79  3.5   |  17<L>  |  0.86    Ca    9.2      27 Sep 2021 07:07  Phos  3.0     09-27  Mg     1.8     09-27    PT/INR - ( 27 Sep 2021 08:52 )   PT: 62.3 sec;   INR: 5.64 ratio      PTT - ( 27 Sep 2021 08:52 )  PTT:50.5 sec  CAPILLARY BLOOD GLUCOSE    Micro:    Culture - Blood (collected 09-25-21 @ 12:23)  Source: .Blood Blood-Venous  Preliminary Report (09-26-21 @ 13:02):    No growth to date.    Culture - Blood (collected 09-25-21 @ 12:19)  Source: .Blood Blood-Peripheral  Preliminary Report (09-26-21 @ 13:02):    No growth to date.    Culture - Urine (collected 09-24-21 @ 22:52)  Source: Catheterized Catheterized  Final Report (09-27-21 @ 09:03):    >100,000 CFU/ml Escherichia coli  Organism: Escherichia coli (09-27-21 @ 09:03)  Organism: Escherichia coli (09-27-21 @ 09:03)      -  Amikacin: S <=16      -  Amoxicillin/Clavulanic Acid: S <=8/4 Consider reserving for cystitis when ampicillin/sulbactam is resistant      -  Ampicillin: R >16 These ampicillin results predict results for amoxicillin      -  Ampicillin/Sulbactam: R >16/8 Enterobacter, Citrobacter, and Serratia may develop resistance during prolonged therapy (3-4 days)      -  Aztreonam: S <=4      -  Cefazolin: S <=2 (MIC_CL_COM_ENTERIC_CEFAZU) For uncomplicated UTI with K. pneumoniae, E. coli, or P. mirablis: POORNIMA <=16 is sensitive and POORNIMA >=32 is resistant. This also predicts results for oral agents cefaclor, cefdinir, cefpodoxime, cefprozil, cefuroxime axetil, cephalexin and locarbef for uncomplicated UTI. Note that some isolates may be susceptible to these agents while testing resistant to cefazolin.      -  Cefepime: S <=2      -  Cefoxitin: S <=8      -  Ceftriaxone: S <=1 Enterobacter, Citrobacter, and Serratia may develop resistance during prolonged therapy      -  Ciprofloxacin: R >2      -  Ertapenem: S <=0.5      -  Gentamicin: S <=2      -  Imipenem: S <=1      -  Levofloxacin: R >4      -  Meropenem: S <=1      -  Nitrofurantoin: S <=32 Should not be used to treat pyelonephritis      -  Piperacillin/Tazobactam: S <=8      -  Tigecycline: S <=2      -  Tobramycin: S <=2      -  Trimethoprim/Sulfamethoxazole: R >2/38      Method Type: POORNMIA      RADIOLOGY & ADDITIONAL TESTS:    NNI

## 2021-09-27 NOTE — CONSULT NOTE ADULT - SUBJECTIVE AND OBJECTIVE BOX
HPI:  89F c hx dementia c/b bedbound/dysphagia/speaks only a few words, AF on coumadin, CVA, aspiration pneumonia, HTN, pw acute metabolic encephalopathy likely 2/2 UTI     (25 Sep 2021 03:45)    PERTINENT PM/SXH:   Hypertension    Smoking    Celiac disease    CVA (cerebral infarction)    Atrial fibrillation    Gastritis    IBS (irritable bowel syndrome)      No significant past surgical history      FAMILY HISTORY:  No pertinent family history in first degree relatives      ITEMS NOT CHECKED ARE NOT PRESENT    SOCIAL HISTORY:   Significant other/partner[ ]  Children[ ]  Faith/Spirituality:  Substance hx:  [ ]   Tobacco hx:  [ ]   Alcohol hx: [ ]   Home Opioid hx:  [ ] I-Stop Reference No:  Living Situation: [ ]Home  [ ]Long term care  [ ]Rehab [ ]Other    ADVANCE DIRECTIVES:    DNR  MOLST  [ ]  Living Will  [ ]   DECISION MAKER(s):  [ ] Health Care Proxy(s)  [ ] Surrogate(s)  [ ] Guardian           Name(s): Phone Number(s):    BASELINE (I)ADL(s) (prior to admission):  [place]: [ ]Total  [ ] Moderate [ ]Dependent    Allergies    Levaquin (Faint)  penicillin (Faint)  sulfa drugs (Unknown)    Intolerances    Lorabid (Faint)  MEDICATIONS  (STANDING):  cefTRIAXone   IVPB 1000 milliGRAM(s) IV Intermittent every 24 hours  lactated ringers. 1000 milliLiter(s) (75 mL/Hr) IV Continuous <Continuous>  levothyroxine 25 MICROGram(s) Oral daily  sertraline 25 milliGRAM(s) Oral daily    MEDICATIONS  (PRN):    PRESENT SYMPTOMS: [ ]Unable to obtain due to poor mentation   Source if other than patient:  [ ]Family   [ ]Team     Pain: [ ]yes [ ]no  QOL impact -   Location -                    Aggravating factors -  Quality -  Radiation -  Timing-  Severity (0-10 scale):  Minimal acceptable level (0-10 scale):     CPOT:    https://www.sccm.org/getattachment/[email or web address]/Critical-Care-Pain-Observation-Tool-(CPOT)      PAIN AD Score:     http://geriatrictoolkit.[facility]/cog/painad.pdf (press ctrl +  left click to view)    Dyspnea:                           [ ]Mild [ ]Moderate [ ]Severe  Anxiety:                             [ ]Mild [ ]Moderate [ ]Severe  Fatigue:                             [ ]Mild [ ]Moderate [ ]Severe  Nausea:                             [ ]Mild [ ]Moderate [ ]Severe  Loss of appetite:              [ ]Mild [ ]Moderate [ ]Severe  Constipation:                    [ ]Mild [ ]Moderate [ ]Severe    Other Symptoms:  [ ]All other review of systems negative     Palliative Performance Status Version 2:         %    http://[facility].org/files/news/palliative_performance_scale_ppsv2.pdf  PHYSICAL EXAM:  Vital Signs Last 24 Hrs  T(C): 36.9 (27 Sep 2021 05:13), Max: 36.9 (27 Sep 2021 05:13)  T(F): 98.5 (27 Sep 2021 05:13), Max: 98.5 (27 Sep 2021 05:13)  HR: 82 (27 Sep 2021 05:13) (75 - 95)  BP: 141/70 (27 Sep 2021 05:13) (141/70 - 160/78)  BP(mean): --  RR: 18 (27 Sep 2021 05:13) (18 - 18)  SpO2: 94% (27 Sep 2021 05:13) (94% - 97%) I&O's Summary    26 Sep 2021 07:01  -  27 Sep 2021 07:00  --------------------------------------------------------  IN: 1250 mL / OUT: 0 mL / NET: 1250 mL      GENERAL:  [ ]Alert  [ ]Oriented x   [ ]Lethargic  [ ]Cachexia  [ ]Unarousable  [ ]Verbal  [ ]Non-Verbal  Behavioral:   [ ] Anxiety  [ ] Delirium [ ] Agitation [ ] Other  HEENT:  [ ]Normal   [ ]Dry mouth   [ ]ET Tube/Trach  [ ]Oral lesions  PULMONARY:   [ ]Clear [ ]Tachypnea  [ ]Audible excessive secretions   [ ]Rhonchi        [ ]Right [ ]Left [ ]Bilateral  [ ]Crackles        [ ]Right [ ]Left [ ]Bilateral  [ ]Wheezing     [ ]Right [ ]Left [ ]Bilateral  [ ]Diminished breath sounds [ ]right [ ]left [ ]bilateral  CARDIOVASCULAR:    [ ]Regular [ ]Irregular [ ]Tachy  [ ]Mitchell [ ]Murmur [ ]Other  GASTROINTESTINAL:  [ ]Soft  [ ]Distended   [ ]+BS  [ ]Non tender [ ]Tender  [ ]PEG [ ]OGT/ NGT  Last BM:   GENITOURINARY:  [ ]Normal [ ] Incontinent   [ ]Oliguria/Anuria   [ ]Drew  MUSCULOSKELETAL:   [ ]Normal   [ ]Weakness  [ ]Bed/Wheelchair bound [ ]Edema  NEUROLOGIC:   [ ]No focal deficits  [ ]Cognitive impairment  [ ]Dysphagia [ ]Dysarthria [ ]Paresis [ ]Other   SKIN:   [ ]Normal    [ ]Rash  [ ]Pressure ulcer(s)       Present on admission [ ]y [ ]n    CRITICAL CARE:  [ ] Shock Present  [ ]Septic [ ]Cardiogenic [ ]Neurologic [ ]Hypovolemic  [ ]  Vasopressors [ ]  Inotropes   [ ]Respiratory failure present [ ]Mechanical ventilation [ ]Non-invasive ventilatory support [ ]High flow    [ ]Acute  [ ]Chronic [ ]Hypoxic  [ ]Hypercarbic [ ]Other  [ ]Other organ failure     LABS:                        11.9   6.59  )-----------( 175      ( 27 Sep 2021 07:09 )             35.7   09-27    140  |  102  |  18  ----------------------------<  79  3.5   |  17<L>  |  0.86    Ca    9.2      27 Sep 2021 07:07  Phos  3.0     09-27  Mg     1.8     09-27    PT/INR - ( 27 Sep 2021 08:52 )   PT: 62.3 sec;   INR: 5.64 ratio         PTT - ( 27 Sep 2021 08:52 )  PTT:50.5 sec      RADIOLOGY & ADDITIONAL STUDIES:    PROTEIN CALORIE MALNUTRITION PRESENT: [ ]mild [ ]moderate [ ]severe [ ]underweight [ ]morbid obesity  https://www.andeal.org/vault/2440/web/files/ONC/Table_Clinical%20Characteristics%20to%20Document%20Malnutrition-White%20JV%20et%20al%202012.pdf    Height (cm): 154.9 (09-25-21 @ 02:57)  Weight (kg): 65 (09-25-21 @ 02:57)  BMI (kg/m2): 27.1 (09-25-21 @ 02:57)    [ ]PPSV2 < or = to 30% [ ]significant weight loss  [ ]poor nutritional intake  [ ]anasarca      [ ]Artificial Nutrition      REFERRALS:   [ ]Chaplaincy  [ ]Hospice  [ ]Child Life  [ ]Social Work  [ ]Case management [ ]Holistic Therapy     Goals of Care Document:  HPI:  89F c hx dementia c/b bedbound/dysphagia/speaks only a few words, AF on coumadin, CVA, aspiration pneumonia, HTN, pw acute metabolic encephalopathy likely 2/2 UTI. Palliative care was called for GOC and resources.     PERTINENT PM/SXH:   Hypertension    Smoking    Celiac disease    CVA (cerebral infarction)    Atrial fibrillation    Gastritis    IBS (irritable bowel syndrome)      No significant past surgical history      FAMILY HISTORY:  No pertinent family history in first degree relatives      ITEMS NOT CHECKED ARE NOT PRESENT    SOCIAL HISTORY:   Significant other/partner[x]   Children[ ]  Anglican/Spirituality:  Substance hx:  [ ]   Tobacco hx:  [ ]   Alcohol hx: [ ]   Home Opioid hx:  [ ] I-Stop Reference No:  Living Situation: [x ]Home  [ ]Long term care  [ ]Rehab [ ]Other  As per care coordination notes: Patient resides in a private home with 24/7 HHA 9private). Patient is bedbound.  DME: Daiana juarez, W/C. Patient received home oxygen 2019 from [facility].  ADVANCE DIRECTIVES:    DNR  MOLST  [x ]  Living Will  [ ]   DECISION MAKER(s):  [x ] Health Care Proxy(s)  [ ] Surrogate(s)  [ ] Guardian           Name(s): Phone Number(s): As per the EMR     BASELINE (I)ADL(s) (prior to admission):  [place]: [ ]Total  [ ] Moderate [ ]Dependent    Allergies    Levaquin (Faint)  penicillin (Faint)  sulfa drugs (Unknown)    Intolerances    Lorabid (Faint)  MEDICATIONS  (STANDING):  cefTRIAXone   IVPB 1000 milliGRAM(s) IV Intermittent every 24 hours  lactated ringers. 1000 milliLiter(s) (75 mL/Hr) IV Continuous <Continuous>  levothyroxine 25 MICROGram(s) Oral daily  sertraline 25 milliGRAM(s) Oral daily    MEDICATIONS  (PRN):    PRESENT SYMPTOMS: [ ]Unable to obtain due to poor mentation   Source if other than patient:  [ ]Family   [ ]Team     Pain: [ ]yes [ ]no  QOL impact -   Location -                    Aggravating factors -  Quality -  Radiation -  Timing-  Severity (0-10 scale):  Minimal acceptable level (0-10 scale):     CPOT:    https://www.[facility].org/getattachment/[email or web address]/Critical-Care-Pain-Observation-Tool-(CPOT)      PAIN AD Score:     http://geriatrictoolkit.[facility]/cog/painad.pdf (press ctrl +  left click to view)    Dyspnea:                           [ ]Mild [ ]Moderate [ ]Severe  Anxiety:                             [ ]Mild [ ]Moderate [ ]Severe  Fatigue:                             [ ]Mild [ ]Moderate [ ]Severe  Nausea:                             [ ]Mild [ ]Moderate [ ]Severe  Loss of appetite:              [ ]Mild [ ]Moderate [ ]Severe  Constipation:                    [ ]Mild [ ]Moderate [ ]Severe    Other Symptoms:  [ ]All other review of systems negative     Palliative Performance Status Version 2:         %    http://[facility].org/files/news/palliative_performance_scale_ppsv2.pdf  PHYSICAL EXAM:  Vital Signs Last 24 Hrs  T(C): 36.9 (27 Sep 2021 05:13), Max: 36.9 (27 Sep 2021 05:13)  T(F): 98.5 (27 Sep 2021 05:13), Max: 98.5 (27 Sep 2021 05:13)  HR: 82 (27 Sep 2021 05:13) (75 - 95)  BP: 141/70 (27 Sep 2021 05:13) (141/70 - 160/78)  BP(mean): --  RR: 18 (27 Sep 2021 05:13) (18 - 18)  SpO2: 94% (27 Sep 2021 05:13) (94% - 97%) I&O's Summary    26 Sep 2021 07:01  -  27 Sep 2021 07:00  --------------------------------------------------------  IN: 1250 mL / OUT: 0 mL / NET: 1250 mL      GENERAL:  [ ]Alert  [ ]Oriented x   [ ]Lethargic  [ ]Cachexia  [ ]Unarousable  [ ]Verbal  [ ]Non-Verbal  Behavioral:   [ ] Anxiety  [ ] Delirium [ ] Agitation [ ] Other  HEENT:  [ ]Normal   [ ]Dry mouth   [ ]ET Tube/Trach  [ ]Oral lesions  PULMONARY:   [ ]Clear [ ]Tachypnea  [ ]Audible excessive secretions   [ ]Rhonchi        [ ]Right [ ]Left [ ]Bilateral  [ ]Crackles        [ ]Right [ ]Left [ ]Bilateral  [ ]Wheezing     [ ]Right [ ]Left [ ]Bilateral  [ ]Diminished breath sounds [ ]right [ ]left [ ]bilateral  CARDIOVASCULAR:    [ ]Regular [ ]Irregular [ ]Tachy  [ ]Mitchell [ ]Murmur [ ]Other  GASTROINTESTINAL:  [ ]Soft  [ ]Distended   [ ]+BS  [ ]Non tender [ ]Tender  [ ]PEG [ ]OGT/ NGT  Last BM:   GENITOURINARY:  [ ]Normal [ ] Incontinent   [ ]Oliguria/Anuria   [ ]Drew  MUSCULOSKELETAL:   [ ]Normal   [ ]Weakness  [ ]Bed/Wheelchair bound [ ]Edema  NEUROLOGIC:   [ ]No focal deficits  [ ]Cognitive impairment  [ ]Dysphagia [ ]Dysarthria [ ]Paresis [ ]Other   SKIN:   [ ]Normal    [ ]Rash  [ ]Pressure ulcer(s)       Present on admission [ ]y [ ]n    CRITICAL CARE:  [ ] Shock Present  [ ]Septic [ ]Cardiogenic [ ]Neurologic [ ]Hypovolemic  [ ]  Vasopressors [ ]  Inotropes   [ ]Respiratory failure present [ ]Mechanical ventilation [ ]Non-invasive ventilatory support [ ]High flow    [ ]Acute  [ ]Chronic [ ]Hypoxic  [ ]Hypercarbic [ ]Other  [ ]Other organ failure     LABS:                        11.9   6.59  )-----------( 175      ( 27 Sep 2021 07:09 )             35.7   09-27    140  |  102  |  18  ----------------------------<  79  3.5   |  17<L>  |  0.86    Ca    9.2      27 Sep 2021 07:07  Phos  3.0     09-27  Mg     1.8     09-27    PT/INR - ( 27 Sep 2021 08:52 )   PT: 62.3 sec;   INR: 5.64 ratio         PTT - ( 27 Sep 2021 08:52 )  PTT:50.5 sec      RADIOLOGY & ADDITIONAL STUDIES:    PROTEIN CALORIE MALNUTRITION PRESENT: [ ]mild [ ]moderate [ ]severe [ ]underweight [ ]morbid obesity  https://www.andeal.org/vault/2440/web/files/ONC/Table_Clinical%20Characteristics%20to%20Document%20Malnutrition-White%20JV%20et%20al%202012.pdf    Height (cm): 154.9 (09-25-21 @ 02:57)  Weight (kg): 65 (09-25-21 @ 02:57)  BMI (kg/m2): 27.1 (09-25-21 @ 02:57)    [ ]PPSV2 < or = to 30% [ ]significant weight loss  [ ]poor nutritional intake  [ ]anasarca      [ ]Artificial Nutrition      REFERRALS:   [ ]Chaplaincy  [ ]Hospice  [ ]Child Life  [ ]Social Work  [ ]Case management [ ]Holistic Therapy     Goals of Care Document:  HPI:  89F c hx dementia c/b bedbound/dysphagia/speaks only a few words, AF on coumadin, CVA, aspiration pneumonia, HTN, pw acute metabolic encephalopathy likely 2/2 UTI. Palliative care was called for GOC and resources.     PERTINENT PM/SXH:   Hypertension    Smoking    Celiac disease    CVA (cerebral infarction)    Atrial fibrillation    Gastritis    IBS (irritable bowel syndrome)      No significant past surgical history      FAMILY HISTORY:  No pertinent family history in first degree relatives      ITEMS NOT CHECKED ARE NOT PRESENT    SOCIAL HISTORY:   Significant other/partner[x]   Children[ ]  Mosque/Spirituality:  Substance hx:  [ ]   Tobacco hx:  [ ]   Alcohol hx: [ ]   Home Opioid hx:  [ ] I-Stop Reference No:  Living Situation: [x ]Home  [ ]Long term care  [ ]Rehab [ ]Other  As per care coordination notes: Patient resides in a private home with 24/7 HHA 9private). Patient is bedbound.  DME: Daiana juarez, W/C. Patient [facility] home oxygen 2019 [facility].  ADVANCE DIRECTIVES:    DNR  MOLST  [x ]  Living Will  [ ]   DECISION MAKER(s):  [x ] Health Care Proxy(s)  [ ] Surrogate(s)  [ ] Guardian           Name(s): Phone Number(s): As per Natalie Mcintosh (patient's daughter), the patient's other daughter, Evette Lyons, is the patient's HCP.     BASELINE (I)ADL(s) (prior to admission):  [place]: [ ]Total  [ ] Moderate [x ]Dependent    Allergies    Levaquin (Faint)  penicillin (Faint)  sulfa drugs (Unknown)    Intolerances    Lorabid (Faint)  MEDICATIONS  (STANDING):  cefTRIAXone   IVPB 1000 milliGRAM(s) IV Intermittent every 24 hours  lactated ringers. 1000 milliLiter(s) (75 mL/Hr) IV Continuous <Continuous>  levothyroxine 25 MICROGram(s) Oral daily  sertraline 25 milliGRAM(s) Oral daily    MEDICATIONS  (PRN):    PRESENT SYMPTOMS: [x ]Unable to obtain due to poor mentation   Source if other than patient:  [ ]Family   [ ]Team     Pain: [ ]yes [ ]no  QOL impact -   Location -                    Aggravating factors -  Quality -  Radiation -  Timing-  Severity (0-10 scale):  Minimal acceptable level (0-10 scale):     CPOT:    https://www.[facility].org/getattachment/[email or web address]/Critical-Care-Pain-Observation-Tool-(CPOT)      PAIN AD Score: 0    http://geriatrictoolkit.[facility]/cog/painad.pdf (press ctrl +  left click to view)    Dyspnea:                           [ ]Mild [ ]Moderate [ ]Severe  Anxiety:                             [ ]Mild [ ]Moderate [ ]Severe  Fatigue:                             [ ]Mild [ ]Moderate [ ]Severe  Nausea:                             [ ]Mild [ ]Moderate [ ]Severe  Loss of appetite:              [ ]Mild [ ]Moderate [ ]Severe  Constipation:                    [ ]Mild [ ]Moderate [ ]Severe    Other Symptoms:  [ ]All other review of systems negative     Palliative Performance Status Version 2:    20     %    http://[facility].org/files/news/palliative_performance_scale_ppsv2.pdf  PHYSICAL EXAM:  Vital Signs Last 24 Hrs  T(C): 36.9 (27 Sep 2021 05:13), Max: 36.9 (27 Sep 2021 05:13)  T(F): 98.5 (27 Sep 2021 05:13), Max: 98.5 (27 Sep 2021 05:13)  HR: 82 (27 Sep 2021 05:13) (75 - 95)  BP: 141/70 (27 Sep 2021 05:13) (141/70 - 160/78)  BP(mean): --  RR: 18 (27 Sep 2021 05:13) (18 - 18)  SpO2: 94% (27 Sep 2021 05:13) (94% - 97%) I&O's Summary    26 Sep 2021 07:01  -  27 Sep 2021 07:00  --------------------------------------------------------  IN: 1250 mL / OUT: 0 mL / NET: 1250 mL      GENERAL:  [ ]Alert  [ ]Oriented x   [x ]Lethargic  [ ]Cachexia  [ ]Unarousable  [ ]Verbal  [x ]Non-Verbal  Behavioral:   [ ] Anxiety  [ ] Delirium [ ] Agitation [ ] Other  HEENT:  [ ]Normal   [x]Dry mouth   [ ]ET Tube/Trach  [ ]Oral lesions  PULMONARY:   [x ]Clear [ ]Tachypnea  [ ]Audible excessive secretions   [ ]Rhonchi        [ ]Right [ ]Left [ ]Bilateral  [ ]Crackles        [ ]Right [ ]Left [ ]Bilateral  [ ]Wheezing     [ ]Right [ ]Left [ ]Bilateral  [ ]Diminished breath sounds [ ]right [ ]left [ ]bilateral  CARDIOVASCULAR:    [ ]Regular [x ]Irregular [ ]Tachy  [ ]Mitchell [ ]Murmur [ ]Other  GASTROINTESTINAL:  [x ]Soft  [ ]Distended   [x ]+BS  [ x]Non tender [ ]Tender  [ ]PEG [ ]OGT/ NGT  Last BM:   GENITOURINARY:   [ ]Normal [x ] Incontinent   [ ]Oliguria/Anuria   [ ]Drew  MUSCULOSKELETAL:   [ ]Normal   [x ]Weakness  [ x]Bed/Wheelchair bound [ ]Edema  NEUROLOGIC:   [ ]No focal deficits  [ x]Severe Cognitive impairment  [ ]Dysphagia [ ]Dysarthria [ ]Paresis [ ]Other   SKIN:   [ ]Normal    [ ]Rash  [x ]Pressure ulcer(s)   Right heel stage I     Present on admission [ ]y [ ]n    CRITICAL CARE:  [ ] Shock Present  [ ]Septic [ ]Cardiogenic [ ]Neurologic [ ]Hypovolemic  [ ]  Vasopressors [ ]  Inotropes   [ ]Respiratory failure present [ ]Mechanical ventilation [ ]Non-invasive ventilatory support [ ]High flow    [ ]Acute  [ ]Chronic [ ]Hypoxic  [ ]Hypercarbic [ ]Other  [ ]Other organ failure     LABS:                        11.9   6.59  )-----------( 175      ( 27 Sep 2021 07:09 )             35.7   09-27    140  |  102  |  18  ----------------------------<  79  3.5   |  17<L>  |  0.86    Ca    9.2      27 Sep 2021 07:07  Phos  3.0     09-27  Mg     1.8     09-27    PT/INR - ( 27 Sep 2021 08:52 )   PT: 62.3 sec;   INR: 5.64 ratio         PTT - ( 27 Sep 2021 08:52 )  PTT:50.5 sec      RADIOLOGY & ADDITIONAL STUDIES:  < from: CT Head No Cont (09.24.21 @ 20:20) >    EXAM:  CT BRAIN                            PROCEDURE DATE:  09/24/2021  IMPRESSION:    1)  advanced chronic appearing ischemic changes throughout the white matter of both hemispheres with atrophy. However the ventricles are prominent, and a component of communicating hydrocephalus cannot be excluded. The ventricles have slightly increased in size as compared to prior CT. Follow-up MR imaging of the brain recommended for further assessment.  2)  clear sinuses and mastoids.        --- End of Report ---              AMBER LARIOS MD; Resident Radiology  This document has been electronically signed.  JUSTINE MAYERS MD; Attending Radiologist  This document has been electronically signed. Sep 24 2021  8:40PM    < end of copied text >      PROTEIN CALORIE MALNUTRITION PRESENT: [ ]mild [ ]moderate [ ]severe [ ]underweight [ ]morbid obesity  https://www.andeal.org/vault/2440/web/files/ONC/Table_Clinical%20Characteristics%20to%20Document%20Malnutrition-White%20JV%20et%20al%202012.pdf    Height (cm): 154.9 (09-25-21 @ 02:57)  Weight (kg): 65 (09-25-21 @ 02:57)  BMI (kg/m2): 27.1 (09-25-21 @ 02:57)    [ ]PPSV2 < or = to 30% [ ]significant weight loss  [ ]poor nutritional intake  [ ]anasarca      [ ]Artificial Nutrition      REFERRALS:   [ ]Chaplaincy  [ ]Hospice  [ ]Child Life  [ ]Social Work  [ ]Case management [ ]Holistic Therapy     Goals of Care Document:

## 2021-09-27 NOTE — PROGRESS NOTE ADULT - PROBLEM SELECTOR PLAN 1
Afebrile, normotensive-hypertensive, AMS more somnolent than baseline per HHA.   So far no clinical improvement.  No leukocytosis, no fevers, normotensive to hypertensive  UCx ecoli, sensitive to CTX  - cont ceftriaxone  - Monitor for clinical improvement

## 2021-09-27 NOTE — CONSULT NOTE ADULT - PROBLEM SELECTOR RECOMMENDATION 5
Will continue to f/u for GOC and support.         Joshua Wilson MD   Geriatrics and Palliative Care (GAP) Consult Service    of Geriatric and Palliative Medicine  [facility]      Please page the following number for clinical matters between the hours of 9 am and 5 pm from Monday through Friday : [phone]    After 5pm and on weekends, please see the contact information below:    In the event of newly developing, evolving, or worsening symptoms, please contact the Palliative Medicine team via pager (if the patient is at [facility] #[phone] or if the patient is at [facility] #[phone]) The Geriatric and Palliative Medicine service has coverage 24 hours a day/ 7 days a week to provide medical recommendations regarding symptom management needs via telephone

## 2021-09-27 NOTE — PROGRESS NOTE ADULT - PROBLEM SELECTOR PLAN 2
May be exacerbated by above UTI, although no clinical improvement after abx  TSH 7, patient's daughter says has been high for a while. Likely subclinical hypothyroid  - CTH showing mild increase in ventricle size compared to prior  - if persistent confusion despite abx, would work up other causes including hydrocephalus  - Trial of 25mg qd synthroid for now to r/o thyroid contribution to AMS

## 2021-09-28 ENCOUNTER — TRANSCRIPTION ENCOUNTER (OUTPATIENT)
Age: 86
End: 2021-09-28

## 2021-09-28 VITALS
DIASTOLIC BLOOD PRESSURE: 89 MMHG | OXYGEN SATURATION: 95 % | HEART RATE: 86 BPM | SYSTOLIC BLOOD PRESSURE: 134 MMHG | RESPIRATION RATE: 18 BRPM | TEMPERATURE: 98 F

## 2021-09-28 LAB
ALBUMIN SERPL ELPH-MCNC: 3.5 G/DL — SIGNIFICANT CHANGE UP (ref 3.3–5)
ALP SERPL-CCNC: 109 U/L — SIGNIFICANT CHANGE UP (ref 40–120)
ALT FLD-CCNC: 12 U/L — SIGNIFICANT CHANGE UP (ref 10–45)
ANION GAP SERPL CALC-SCNC: 18 MMOL/L — HIGH (ref 5–17)
APTT BLD: 57.9 SEC — HIGH (ref 27.5–35.5)
AST SERPL-CCNC: 19 U/L — SIGNIFICANT CHANGE UP (ref 10–40)
BILIRUB SERPL-MCNC: 0.4 MG/DL — SIGNIFICANT CHANGE UP (ref 0.2–1.2)
BUN SERPL-MCNC: 17 MG/DL — SIGNIFICANT CHANGE UP (ref 7–23)
CALCIUM SERPL-MCNC: 9.3 MG/DL — SIGNIFICANT CHANGE UP (ref 8.4–10.5)
CHLORIDE SERPL-SCNC: 105 MMOL/L — SIGNIFICANT CHANGE UP (ref 96–108)
CO2 SERPL-SCNC: 18 MMOL/L — LOW (ref 22–31)
CREAT SERPL-MCNC: 0.88 MG/DL — SIGNIFICANT CHANGE UP (ref 0.5–1.3)
GLUCOSE SERPL-MCNC: 51 MG/DL — CRITICAL LOW (ref 70–99)
HCT VFR BLD CALC: 37.3 % — SIGNIFICANT CHANGE UP (ref 34.5–45)
HGB BLD-MCNC: 12.2 G/DL — SIGNIFICANT CHANGE UP (ref 11.5–15.5)
INR BLD: 7.14 RATIO — CRITICAL HIGH (ref 0.88–1.16)
MAGNESIUM SERPL-MCNC: 1.8 MG/DL — SIGNIFICANT CHANGE UP (ref 1.6–2.6)
MCHC RBC-ENTMCNC: 28.2 PG — SIGNIFICANT CHANGE UP (ref 27–34)
MCHC RBC-ENTMCNC: 32.7 GM/DL — SIGNIFICANT CHANGE UP (ref 32–36)
MCV RBC AUTO: 86.1 FL — SIGNIFICANT CHANGE UP (ref 80–100)
NRBC # BLD: 0 /100 WBCS — SIGNIFICANT CHANGE UP (ref 0–0)
PHOSPHATE SERPL-MCNC: 2.9 MG/DL — SIGNIFICANT CHANGE UP (ref 2.5–4.5)
PLATELET # BLD AUTO: 176 K/UL — SIGNIFICANT CHANGE UP (ref 150–400)
POTASSIUM SERPL-MCNC: 3.8 MMOL/L — SIGNIFICANT CHANGE UP (ref 3.5–5.3)
POTASSIUM SERPL-SCNC: 3.8 MMOL/L — SIGNIFICANT CHANGE UP (ref 3.5–5.3)
PROT SERPL-MCNC: 6.2 G/DL — SIGNIFICANT CHANGE UP (ref 6–8.3)
PROTHROM AB SERPL-ACNC: 78 SEC — HIGH (ref 10.6–13.6)
RBC # BLD: 4.33 M/UL — SIGNIFICANT CHANGE UP (ref 3.8–5.2)
RBC # FLD: 15 % — HIGH (ref 10.3–14.5)
SODIUM SERPL-SCNC: 141 MMOL/L — SIGNIFICANT CHANGE UP (ref 135–145)
WBC # BLD: 6.1 K/UL — SIGNIFICANT CHANGE UP (ref 3.8–10.5)
WBC # FLD AUTO: 6.1 K/UL — SIGNIFICANT CHANGE UP (ref 3.8–10.5)

## 2021-09-28 PROCEDURE — 71045 X-RAY EXAM CHEST 1 VIEW: CPT

## 2021-09-28 PROCEDURE — 80053 COMPREHEN METABOLIC PANEL: CPT

## 2021-09-28 PROCEDURE — 99239 HOSP IP/OBS DSCHRG MGMT >30: CPT

## 2021-09-28 PROCEDURE — 84295 ASSAY OF SERUM SODIUM: CPT

## 2021-09-28 PROCEDURE — 85027 COMPLETE CBC AUTOMATED: CPT

## 2021-09-28 PROCEDURE — 85610 PROTHROMBIN TIME: CPT

## 2021-09-28 PROCEDURE — 86769 SARS-COV-2 COVID-19 ANTIBODY: CPT

## 2021-09-28 PROCEDURE — 83605 ASSAY OF LACTIC ACID: CPT

## 2021-09-28 PROCEDURE — 99285 EMERGENCY DEPT VISIT HI MDM: CPT | Mod: 25

## 2021-09-28 PROCEDURE — 80048 BASIC METABOLIC PNL TOTAL CA: CPT

## 2021-09-28 PROCEDURE — 81001 URINALYSIS AUTO W/SCOPE: CPT

## 2021-09-28 PROCEDURE — 87086 URINE CULTURE/COLONY COUNT: CPT

## 2021-09-28 PROCEDURE — 84443 ASSAY THYROID STIM HORMONE: CPT

## 2021-09-28 PROCEDURE — 82947 ASSAY GLUCOSE BLOOD QUANT: CPT

## 2021-09-28 PROCEDURE — 96366 THER/PROPH/DIAG IV INF ADDON: CPT

## 2021-09-28 PROCEDURE — 36415 COLL VENOUS BLD VENIPUNCTURE: CPT

## 2021-09-28 PROCEDURE — 82803 BLOOD GASES ANY COMBINATION: CPT

## 2021-09-28 PROCEDURE — U0003: CPT

## 2021-09-28 PROCEDURE — 82435 ASSAY OF BLOOD CHLORIDE: CPT

## 2021-09-28 PROCEDURE — 82607 VITAMIN B-12: CPT

## 2021-09-28 PROCEDURE — 85018 HEMOGLOBIN: CPT

## 2021-09-28 PROCEDURE — 82962 GLUCOSE BLOOD TEST: CPT

## 2021-09-28 PROCEDURE — 84100 ASSAY OF PHOSPHORUS: CPT

## 2021-09-28 PROCEDURE — 85025 COMPLETE CBC W/AUTO DIFF WBC: CPT

## 2021-09-28 PROCEDURE — 97165 OT EVAL LOW COMPLEX 30 MIN: CPT

## 2021-09-28 PROCEDURE — 87186 SC STD MICRODIL/AGAR DIL: CPT

## 2021-09-28 PROCEDURE — 85014 HEMATOCRIT: CPT

## 2021-09-28 PROCEDURE — 84132 ASSAY OF SERUM POTASSIUM: CPT

## 2021-09-28 PROCEDURE — 85730 THROMBOPLASTIN TIME PARTIAL: CPT

## 2021-09-28 PROCEDURE — 87040 BLOOD CULTURE FOR BACTERIA: CPT

## 2021-09-28 PROCEDURE — 82330 ASSAY OF CALCIUM: CPT

## 2021-09-28 PROCEDURE — 83735 ASSAY OF MAGNESIUM: CPT

## 2021-09-28 PROCEDURE — 70450 CT HEAD/BRAIN W/O DYE: CPT | Mod: MA

## 2021-09-28 PROCEDURE — 96365 THER/PROPH/DIAG IV INF INIT: CPT

## 2021-09-28 PROCEDURE — U0005: CPT

## 2021-09-28 RX ORDER — SODIUM CHLORIDE 9 MG/ML
1000 INJECTION, SOLUTION INTRAVENOUS
Refills: 0 | Status: DISCONTINUED | OUTPATIENT
Start: 2021-09-28 | End: 2021-09-28

## 2021-09-28 RX ORDER — PHYTONADIONE (VIT K1) 5 MG
10 TABLET ORAL ONCE
Refills: 0 | Status: DISCONTINUED | OUTPATIENT
Start: 2021-09-28 | End: 2021-09-28

## 2021-09-28 RX ORDER — PHYTONADIONE (VIT K1) 5 MG
2.5 TABLET ORAL ONCE
Refills: 0 | Status: COMPLETED | OUTPATIENT
Start: 2021-09-28 | End: 2021-09-28

## 2021-09-28 RX ORDER — WARFARIN SODIUM 2.5 MG/1
1 TABLET ORAL
Qty: 0 | Refills: 0 | DISCHARGE

## 2021-09-28 RX ORDER — ASCORBIC ACID 60 MG
1 TABLET,CHEWABLE ORAL
Qty: 0 | Refills: 0 | DISCHARGE

## 2021-09-28 RX ORDER — IRBESARTAN 75 MG/1
1 TABLET ORAL
Qty: 0 | Refills: 0 | DISCHARGE

## 2021-09-28 RX ORDER — WARFARIN SODIUM 2.5 MG/1
0.5 TABLET ORAL
Qty: 0 | Refills: 0 | DISCHARGE

## 2021-09-28 RX ORDER — AMLODIPINE BESYLATE 2.5 MG/1
1 TABLET ORAL
Qty: 0 | Refills: 0 | DISCHARGE

## 2021-09-28 RX ADMIN — SODIUM CHLORIDE 75 MILLILITER(S): 9 INJECTION, SOLUTION INTRAVENOUS at 09:15

## 2021-09-28 RX ADMIN — CEFTRIAXONE 100 MILLIGRAM(S): 500 INJECTION, POWDER, FOR SOLUTION INTRAMUSCULAR; INTRAVENOUS at 06:29

## 2021-09-28 RX ADMIN — Medication 2.5 MILLIGRAM(S): at 10:30

## 2021-09-28 NOTE — CHART NOTE - NSCHARTNOTEFT_GEN_A_CORE
Discussed with patient's daughters Randa at bedside and Evette on the phon regarding warfarin for patient.    Explained that giving the medication would be difficult because it was oral, and requires monitoring with blood draws for INR, which have been elevated even while holding the warfarin. I explained that this could be due to the change in the patient's clinical status and lack of diet. I explained that finding the right dosing takes a lot of time and many blood draws with monitoring of INR. I also explained the risks of not taking the warfarin, which is the original reason the patient is on the medication, to prevent cardioembolic stroke and other organ damage.     The patient's 2 daughters in agreement to hold off on giving warfarin.     Chris Malik PGY-1  Internal Medicine Discussed with patient's daughters Randa at bedside and Evette on the phone regarding warfarin for patient.    Explained that giving the medication would be difficult because it was oral, and requires monitoring with blood draws for INR, which have been elevated even while holding the warfarin. I explained that this could be due to the change in the patient's clinical status and lack of diet. I explained that finding the right dosing takes a lot of time and many blood draws with monitoring of INR. I also explained the risks of not taking the warfarin, which is the original reason the patient is on the medication, to prevent cardioembolic stroke and other organ damage.     The patient's 2 daughters in agreement to hold off on giving warfarin.     Chris Malik PGY-1  Internal Medicine

## 2021-09-28 NOTE — PROGRESS NOTE ADULT - PROBLEM SELECTOR PLAN 3
With new poor PO intake  Possibly end stage dementia  - Pal care consulted, daughter agreeable to consult. Will f/u pal care recs. Will consider if qualifies for hospice   - both of pt's 2 daughters Evette and Randa are HCP  - OT eval once cognitively improved  - cont sertraline - start dysphagia 1 with nectar thickened  - aspiration precaution, hob elevation  - Comfort feeds

## 2021-09-28 NOTE — DISCHARGE NOTE PROVIDER - NSDCCPCAREPLAN_GEN_ALL_CORE_FT
PRINCIPAL DISCHARGE DIAGNOSIS  Diagnosis: Urinary tract infection  Assessment and Plan of Treatment: You were given antibiotics, completed a course. The bacteria treated was found to be sensitive to the antibiotic used.      SECONDARY DISCHARGE DIAGNOSES  Diagnosis: Altered mental status  Assessment and Plan of Treatment: Patient was found to have altered mental status, mumbling words, and not eating well. Was not eating while admitted to the hospital. Was put on IV fluids.

## 2021-09-28 NOTE — PROGRESS NOTE ADULT - SUBJECTIVE AND OBJECTIVE BOX
Subjective:    INTERVAL HPI/OVERNIGHT EVENTS:   - Palliative saw patient and daughter. Patient's daughter agreed to hospice referral, likely home hospice  No acute events overnight  Afebrile, hemodynamically stable     T(F): 98.1 (09-28-21 @ 04:47), Max: 98.2 (09-27-21 @ 21:19)  HR: 82 (09-28-21 @ 04:47) (71 - 82)  BP: 157/89 (09-28-21 @ 04:47) (146/84 - 164/88)  RR: 18 (09-28-21 @ 04:47) (18 - 18)  SpO2: 94% (09-28-21 @ 04:47) (94% - 97%)  I&O's Summary    27 Sep 2021 07:01  -  28 Sep 2021 07:00  --------------------------------------------------------  IN: 450 mL / OUT: 0 mL / NET: 450 mL    Medications:  cefTRIAXone   IVPB 1000 milliGRAM(s) IV Intermittent every 24 hours  lactated ringers. 1000 milliLiter(s) (75 mL/Hr) IV Continuous <Continuous>  levothyroxine 25 MICROGram(s) Oral daily  sertraline 25 milliGRAM(s) Oral daily    PHYSICAL EXAM:  GENERAL: Patient laying in bed, in no acute distress, very somnolent. A&Ox0. Not following commands, not responding to questions.   HEAD:  Normocephalic, Atraumatic.  EYES: EOMI, PERRLA, conjunctiva and sclera clear  NECK: Supple, No JVD, Normal thyroid, no enlarged nodes  NERVOUS SYSTEM:  Somnolent. Cannot assess motor/sensory neurologic function.   CHEST/LUNG: CTAB. No rales, rhonchi, or wheezes appreciated  HEART: Irr Irr rhythm. Regular rate.  ABDOMEN: Soft, Nontender, Nondistended; Normal bowel sounds.  EXTREMITIES: 2+ Peripheral Pulses, No clubbing, cyanosis, or edema    Notable Labs:    Labs:                          12.2   6.10  )-----------( 176      ( 28 Sep 2021 06:51 )             37.3     09-27    140  |  102  |  18  ----------------------------<  79  3.5   |  17<L>  |  0.86    Ca    9.2      27 Sep 2021 07:07  Phos  3.0     09-27  Mg     1.8     09-27        PT/INR - ( 28 Sep 2021 06:51 )   PT: 78.0 sec;   INR: 7.14 ratio         PTT - ( 28 Sep 2021 06:51 )  PTT:57.9 sec  CAPILLARY BLOOD GLUCOSE    Micro:    Culture - Blood (collected 09-25-21 @ 12:23)  Source: .Blood Blood-Venous  Preliminary Report (09-26-21 @ 13:02):    No growth to date.    Culture - Blood (collected 09-25-21 @ 12:19)  Source: .Blood Blood-Peripheral  Preliminary Report (09-26-21 @ 13:02):    No growth to date.    RADIOLOGY & ADDITIONAL TESTS:    NNI   Subjective:    INTERVAL HPI/OVERNIGHT EVENTS:   - Palliative saw patient and daughter. Patient's daughter agreed to hospice referral, likely home hospice  - Daughter at bedside. Appreciated pal care consult.  Wants to pursue hospice.   - Patient did not eat ON. Not responding to daughter.  ROS unable to be obtained due to mental status.   No acute events overnight  Afebrile, hemodynamically stable     T(F): 98.1 (09-28-21 @ 04:47), Max: 98.2 (09-27-21 @ 21:19)  HR: 82 (09-28-21 @ 04:47) (71 - 82)  BP: 157/89 (09-28-21 @ 04:47) (146/84 - 164/88)  RR: 18 (09-28-21 @ 04:47) (18 - 18)  SpO2: 94% (09-28-21 @ 04:47) (94% - 97%)  I&O's Summary    27 Sep 2021 07:01  -  28 Sep 2021 07:00  --------------------------------------------------------  IN: 450 mL / OUT: 0 mL / NET: 450 mL    Medications:  cefTRIAXone   IVPB 1000 milliGRAM(s) IV Intermittent every 24 hours  lactated ringers. 1000 milliLiter(s) (75 mL/Hr) IV Continuous <Continuous>  levothyroxine 25 MICROGram(s) Oral daily  sertraline 25 milliGRAM(s) Oral daily    PHYSICAL EXAM:  GENERAL: Patient laying in bed, in no acute distress, very somnolent. A&Ox0. Not following commands, not responding to questions.   HEAD:  Normocephalic, Atraumatic.  EYES: EOMI, PERRLA, conjunctiva and sclera clear  NECK: Supple, No JVD, Normal thyroid, no enlarged nodes  NERVOUS SYSTEM:  Somnolent. Cannot assess motor/sensory neurologic function.   CHEST/LUNG: CTAB. No rales, rhonchi, or wheezes appreciated  HEART: Irr Irr rhythm. Regular rate.  ABDOMEN: Soft, Nontender, Nondistended; Normal bowel sounds.  EXTREMITIES: 2+ Peripheral Pulses, No clubbing, cyanosis, or edema    Notable Labs:    Labs:                          12.2   6.10  )-----------( 176      ( 28 Sep 2021 06:51 )             37.3     09-27    140  |  102  |  18  ----------------------------<  79  3.5   |  17<L>  |  0.86    Ca    9.2      27 Sep 2021 07:07  Phos  3.0     09-27  Mg     1.8     09-27        PT/INR - ( 28 Sep 2021 06:51 )   PT: 78.0 sec;   INR: 7.14 ratio         PTT - ( 28 Sep 2021 06:51 )  PTT:57.9 sec  CAPILLARY BLOOD GLUCOSE    FS 52    Micro:    Culture - Blood (collected 09-25-21 @ 12:23)  Source: .Blood Blood-Venous  Preliminary Report (09-26-21 @ 13:02):    No growth to date.    Culture - Blood (collected 09-25-21 @ 12:19)  Source: .Blood Blood-Peripheral  Preliminary Report (09-26-21 @ 13:02):    No growth to date.    RADIOLOGY & ADDITIONAL TESTS:    NNI

## 2021-09-28 NOTE — DISCHARGE NOTE NURSING/CASE MANAGEMENT/SOCIAL WORK - PATIENT PORTAL LINK FT
You can access the FollowMyHealth Patient Portal offered by [facility] by registering at the following website: [email or web address]. By joining [employer]’s FollowMyHealth portal, you will also be able to view your health information using other applications (apps) compatible with our system.

## 2021-09-28 NOTE — HOSPICE CARE NOTE - CONVESATION DETAILS
Pt has been approved for [facility] home hospice services.    Met w/Pt's dtr Randa Fernandes at bedside.    - [facility] services/POC discussed.    - Consents completed.    - No DME requested at this time.    - Pt has private hire HHA assist.    - Dtrs will be caring for the Pt at home, until Aides are in place.    RAMYA Rajan has arranged d/c transport for this evening.    - Dtr Randa is in agreement.     Pt has been approved for [facility] home hospice services.    Met w/Pt's gagan Tolentino Dena at bedside.    - [facility] services/POC discussed.    - Consents completed.    - DME ordered: Oxygen. Delivery scheduled for today.      Gagan Tolentino is in agreement.    - Pt has private hire HHA assist.    - Dtrs will be caring for the Pt at home, until Aides are in place.    RAMYA Rajan has arranged d/c transport for this evening.    - Gagan Tolentino is in agreement.

## 2021-09-28 NOTE — PROGRESS NOTE ADULT - PROBLEM SELECTOR PLAN 2
May be exacerbated by above UTI, although no clinical improvement after abx  TSH 7, patient's daughter says has been high for a while. Likely subclinical hypothyroid  - CTH showing mild increase in ventricle size compared to prior  - if persistent confusion despite abx, would work up other causes including hydrocephalus  - Trial of 25mg qd synthroid for now to r/o thyroid contribution to AMS Afebrile, normotensive-hypertensive, AMS more somnolent than baseline per HHA.   So far no clinical improvement.  No leukocytosis, no fevers, normotensive to hypertensive  UCx ecoli, sensitive to CTX  - DC ceftriaxone 9/28, had 4d course   - Monitor for clinical improvement  - No clinical improvement. Unlikely to be causing acute change of mental status.

## 2021-09-28 NOTE — PROGRESS NOTE ADULT - PROBLEM SELECTOR PLAN 4
- start dysphagia 1 with nectar thickened  - aspiration precaution, hob elevation INR high 5.64 on 9/27. 7.14 on 9/28  - Home: coumadin 2mg on MWFSa, 1mg on other days.  - Daily coumadin order  - Daily INR  - not on rate control meds  - very high chadsvasc    -Corrected with vitamin K. No PO intake, can be leading to higher INR. INR high 5.64 on 9/27. 7.14 on 9/28  - Home: coumadin 2mg on MWFSa, 1mg on other days.  - Daily coumadin order  - Daily INR  - not on rate control meds  - very high chadsvasc    - Corrected with vitamin K. No PO intake, can be leading to higher INR.  - COnsider holding warfarin even after INR downtrends

## 2021-09-28 NOTE — PROGRESS NOTE ADULT - PROBLEM SELECTOR PLAN 1
Afebrile, normotensive-hypertensive, AMS more somnolent than baseline per HHA.   So far no clinical improvement.  No leukocytosis, no fevers, normotensive to hypertensive  UCx ecoli, sensitive to CTX  - cont ceftriaxone  - Monitor for clinical improvement With new poor PO intake  Likely end stage dementia  Possibly exacerbated by UTI, no clinical improvement after abx  TSH 7, patient's daughter says has been high for a while. Likely subclinical hypothyroid  CTH showing mild increase in ventricle size compared to prior  - Pal care consult appreciated. Will consult hospice, likely home hospice care for DC   - both of pt's 2 daughters Evette and Randa are HCP  - cont sertraline  - Trial of 25mg qd synthroid for now to r/o thyroid contribution to AMS

## 2021-09-28 NOTE — DISCHARGE NOTE PROVIDER - NSDCMRMEDTOKEN_GEN_ALL_CORE_FT
warfarin 2 mg oral tablet: 1 tab(s) orally once a day for 2 day of the week  warfarin 2 mg oral tablet: 0.5 tab(s) orally 5 times a week

## 2021-09-28 NOTE — PROGRESS NOTE ADULT - PROBLEM SELECTOR PLAN 7
Diet: DASH/TLC  DVT PPx: on warfarin  Dispo: upon clinical improvement, likely DC to home with aids    Code status: Need to clarify with daughters who are her HCPs
Diet: DASH/TLC pureed  DVT PPx: on warfarin  Dispo: upon clinical improvement, likely DC to home with aids vs. hospice. Pending palliative care recs    Code status: DNR DNI, MOLST in chart
Diet: DASH/TLC pureed  DVT PPx: on warfarin  Dispo: upon clinical improvement, likely DC to home with aids vs. hospice. Pending palliative care recs    Code status: DNR DNI, MOLST in chart
Diet: DASH/TLC  DVT PPx: on warfarin  Dispo: upon clinical improvement, likely DC to home with aids    Code status: Need to clarify with daughters who are her HCPs

## 2021-09-28 NOTE — PROGRESS NOTE ADULT - PROBLEM SELECTOR PLAN 5
INR high 5.64 on 9/27  - coumadin 2mg on MWFSa, 1mg on other days.  - Daily coumadin order  - Daily INR  - not on rate control meds  - very high chadsvasc - holding bp meds.  - restart arb or ccb as pressures increase.

## 2021-09-28 NOTE — PROGRESS NOTE ADULT - PROBLEM SELECTOR PLAN 6
- holding bp meds.  - restart arb or ccb as pressures increase. Diet: DASH/TLC pureed  DVT PPx: on warfarin  Dispo: Likely DC to home with aid vs. hospice.    Code status: DNR DNI, MOLST in chart

## 2021-09-28 NOTE — DISCHARGE NOTE PROVIDER - CARE PROVIDER_API CALL
MAHAMED IGNACIO  Internal Medicine  [address]  [place] [address]  Phone: [phone]  Fax: [phone]  Established Patient  Follow Up Time:

## 2021-09-28 NOTE — DISCHARGE NOTE PROVIDER - HOSPITAL COURSE
89F c hx dementia c/b bedbound/dysphagia/speaks only a few words, AF on coumadin, CVA, aspiration pneumonia, HTN, pw 2-3 days confusion and poor appetite.    History from aide at bedside. Collateral obtained from pt's daughter Evette over phone. Unable to obtain any history from pt. At baseline, pt is able to make some of her needs known, answers questions appropriately with few word answers. Over the past 3 days, aide has noticed increased confusion. Daughter states pt has been speaking gibberish, which is not normal. And over the past 1 day, has not been eating any food. Aide denies that pt has had any diarrhea, cough, vomiting, or complaining of pain anywhere.    VS: Tm 97.9, P 76, /76, r 18, 95% ra  in the ED, received ceftriaxone, , haldol 5    While inpatient, patient did not improve on CTX for UTI. Was determined to most likely be progression of dementia to end-stage dementia. Little to no PO intake, AOx3, eventually not responding to daughter. Pallaitive and hospice consults placed, spoke with family.     Patient is hemodynamically stable and medically optimized for discharge to Home Hospice. Case discussed with attending.

## 2021-09-28 NOTE — PROGRESS NOTE ADULT - ATTENDING COMMENTS
No improvement in mentation this morning.   Continue ceftriaxone for UTI tx (urine cx growing e coli sensitive to Ceft)  Consult palliative care team  Supratherapeutic INR - recheck INR. Hold warfarin for today  ?subclinical hypothyroidism - continue levothyroxine  Daughter updated at bedside
Patient seen and examined at bedside. No acute events overnight. Not really getting better and still encephalopathic from baseline. Question is if this is truly acute or progression of her worsening dementia. For now we will continue with Ceftriaxone and follow up urine cultures. If she does not improve may need to consider other etiology. Diagnosis of exclusion would be dementia. Plan discussed with daughter over the phone and aide at bedside.
Patient seen and examined at bedside. No acute events overnight. Per HHA at bedside patient with increasing confusion and decrease PO intake in past several days. Apparently functional status is limited at baseline. I spoke with daughter over the phone who apparently works here as well. Given acuteness of presentation first concern is always infection. Patient with no leukocytosis and clear CXR. UA may indicate a cystitis so we are giving ceftriaxone for possible infectious encephalopathy.    CT head showing advanced chronic appearing ischemic changes throughout the white matter with atrophy with prominent ventricles. I don't think these findings would explain acute presentation. May be we are seeing hydrocephalus ex vacuo? Regardless the prudent thing to do would be to provide antibiotics to see if patient improves before exploring other possible causes. Follow up urine cultures. Can continue with Warfarin.
Mentation unchanged this morning, no improvement. Patient has not eaten anything per daughter at bedside, pleasure feeds dysphagia diet as tolerated. Hypoglycemic this am - continue D5LR IVF   Continue ceftriaxone for UTI tx (urine cx growing e coli sensitive to Ceft)  Appreciate palliative care team consult - rec hospice care given advanced dementia and declining status, daughter in agreement  Supratherapeutic INR - recheck INR. continue to hold warfarin   Discharge planning - home hospice, F/U CM  Daughter updated at bedside

## 2021-09-28 NOTE — PROGRESS NOTE ADULT - ASSESSMENT
89F c hx dementia c/b bedbound/dysphagia/speaks only a few words, AF on coumadin, CVA, aspiration pneumonia, HTN, pw acute metabolic encephalopathy likely 2/2 UTI

## 2022-02-08 NOTE — DISCHARGE NOTE ADULT - WARFARIN/COUMADIN - POTENTIAL FOR ADVERSE DRUG REACTIONS AND INTERACTIONS
Statement Selected Ivermectin Pregnancy And Lactation Text: This medication is Pregnancy Category C and it isn't known if it is safe during pregnancy. It is also excreted in breast milk.

## 2022-02-10 NOTE — ED PROVIDER NOTE - ATTENDING CONTRIBUTION TO CARE
HR=65 bpm, TFFL=887/61 mmhg, HeP0=085.0 %, Resp=14 B/min, EtCO2=36 mmHg, Apnea=0 Seconds, Parviz=10, Comment=SB attending Jimbo: 87yF wheelchair bound BIBEMS from home with 3 days of progressive SOB. Seen by PMD yesterday, started on doxycycline without improvement. +chills. On exam, febrile, tachycardic, tachypneic, coarse breath sounds diffusely. Sepsis. Will obtain labs including vbg with lactate and blood cultures, RVP, cxr, empiric abx, UA and admit

## 2022-06-10 NOTE — DISCHARGE NOTE NURSING/CASE MANAGEMENT/SOCIAL WORK - NSTRANSFERBELONGINGSRESP_GEN_A_NUR
ED to inpatient nurses report    Chief Complaint   Patient presents with    Chest Pain     onset [id number]    Shortness of Breath      Present to ED from home  LOC: alert and orientated to name, place, date  Vital signs   Vitals:    06/10/22 1256 06/10/22 1356 06/10/22 1456 06/10/22 1556   BP: (!) 164/83 (!) 153/82 (!) 164/87 (!) 157/85   Pulse: 54 50 51 59   Resp: 18 18 18 18   Temp:       TempSrc:       SpO2: 99% 98% 98% 100%   Weight:       Height:          Oxygen Baseline Room Air    Current needs required Room Air Bipap/Cpap No  LDAs:   Peripheral IV 06/10/22 Right Antecubital (Active)   Site Assessment Clean, dry & intact 06/10/22 1557   Line Status Normal saline locked 06/10/22 1557   Phlebitis Assessment No symptoms 06/10/22 1557   Infiltration Assessment 0 06/10/22 1557   Dressing Status Clean, dry & intact 06/10/22 1557   Dressing Type Transparent 06/10/22 1357     Mobility: Independent  Pending ED orders: NA  Present condition: Pt resting on cot in position of comfort. Pt is A&Ox4, resps easy and unlabored. IV shows no s/s of infection or infiltration. Pt currently denies pain.   Person of contract, phone number   Our promise was given to patient    Electronically signed by Karen Madrid RN on 6/10/2022 at 4:46 PM       Karen Madrid RN  06/10/22 4034 yes

## 2022-10-06 NOTE — PATIENT PROFILE ADULT. - TEACHING/LEARNING FACTORS INFLUENCE READINESS TO LEARN
51 yo F with PMHx recurrent pancreatitis and HTN who presents to the ED complaining of abdominal pain x 2 days. Pt. Reports epigastric sharp abdominal pain with radiation to her back which started yesterday morning. Pt. Reports pain is worse when eating, and she has not been able to tolerate much PO intake due to pain and nausea. She denies any vomiting, fevers, chills, lightheadedness, or palpitations. Pt. Has had recurrent issues with pancreatitis and has been worked up by GI in the past without clear etiology. Biopsy of lesion on pancreatic tail from 10/2019 showed fat necrosis, no evidence of malignancy. Pt. Reports occasional alcohol use, last drink [birth year]. She states she only had a couple of drinks that day and prior to that she has not had a drink in a long time.  
none

## 2022-12-16 NOTE — PHYSICAL THERAPY INITIAL EVALUATION ADULT - FUNCTIONAL LIMITATIONS, PT EVAL
Pt arrives w/ [facility] from [facility] for medical clearance. States he is suicidal but does not have a plan.   self-care

## 2022-12-28 NOTE — DISCHARGE NOTE ADULT - ADMISSION DATE +STARTOFVISITDATE
----- Message from Perri Quintana RN sent at 12/28/2022  8:38 AM CST -----  Regarding: FW: Blood transfusion.   Please call [facility] to arrange for one unit PRBC, he will need a type and screen as well to be draw at [facility] outpatient lab today (order for Type and screen was placed).  ----- Message -----  From: Kyle Zaman MD  Sent: 12/28/2022   5:32 AM CST  To: Kathleen Cruz PA-C, #  Subject: Blood transfusion.                               He needs to be transfused one unit of PRBC Set him up.     Statement Selected

## 2023-01-03 NOTE — PATIENT PROFILE ADULT - NSPROMUTANXFEARFT_GEN_A_NUR
pt co chest pain/sore throat. no fever, sob. +myalgias. +fever  viral like illness  chest pain  exam unremarkable as above. none

## 2023-01-03 NOTE — ED PROVIDER NOTE - CPE EDP RESP NORM
Rx Refill Note  Requested Prescriptions     Pending Prescriptions Disp Refills   • nystatin (MYCOSTATIN) 100,000 unit/mL suspension [Pharmacy Med Name: Nystatin [id number] UNIT/ML Mouth/Throat Suspension] 60 mL 0     Sig: SWISH & SWALLOW 5 ML BY MOUTH  4 TIMES DAILY      Last office visit with prescribing clinician: 12/29/2022     Next office visit with prescribing clinician: 3/30/2023                         Sia Villagomez MA  01/03/23, 17:08 CST   normal...

## 2023-01-20 NOTE — DISCHARGE NOTE PROVIDER - NSDCCONDITION_GEN_ALL_CORE
Outpatient - Speech Language Pathology   Swallow Modified Barium Swallow Study  Eddie     Patient Name: Kelsey Ross  : 1969  MRN: [id number]  Today's Date: 2023               Admit Date: 2023    Visit Dx:     ICD-10-CM ICD-9-CM   1. Dysphagia, unspecified type  R13.10 787.20     Patient Active Problem List   Diagnosis   • Acquired hypothyroidism   • Fatigue   • Weight gain   • Essential hypertension   • Allergic rhinitis   • Deviated nasal septum   • Hypertrophy of nasal turbinates   • Vasomotor rhinitis   • Eosinophilia   • Basophilia   • Abnormal C-reactive protein   • Eosinophilic granulomatosis with polyangiitis (EGPA) ([facility])   • Colon cancer screening   • Nasal polyposis   • Mild persistent asthma   • Postablative hypothyroidism   • Vitamin D deficiency   • Systemic lupus erythematosus with glomerular disease ([facility])   • Asthma, extrinsic   • Nausea   • Lower extremity edema   • Persistent proteinuria     Past Medical History:   Diagnosis Date   • Allergic rhinitis    • Bilateral ovarian cysts    • Chronic kidney disease     proteinuria and trying to diagnose egpa   • Constipation    • COVID      and    has received vaccines   • Deaf, left     high fever as a child   • Delayed emergence from anesthesia 2021   • Ear piercing    • Eosinophilic Asthma    • Goiter    • Graves disease    • H/O echocardiogram 2022   • Hyperlipidemia    • Hypertension    • Hyperthyroidism    • Hypothyroidism    • Migraines    • Pain     chronic pain all over body - on no pain meds   • Thyroid disease    • Visual acuity reduced     right eye r/t detached retina   • Vitamin D deficiency      Past Surgical History:   Procedure Laterality Date   • CATARACT EXTRACTION, BILATERAL      rught eye only   •  SECTION     • COLONOSCOPY N/A 2022    Procedure: COLONOSCOPY;  Surgeon: Long Vernon MD;  Location: [facility] ENDOSCOPY;  Service: Gastroenterology;  Laterality: N/A;    • EYE SURGERY Right     cataract   • HYSTERECTOMY      complete   • LAPAROSCOPIC CHOLECYSTECTOMY     • NOSE SURGERY     • RETINAL DETACHMENT SURGERY     • RHINOPLASTY     • SINUS SURGERY  03/26/2021       SLP Recommendation and Plan  SLP Swallowing Diagnosis: functional oral phase, functional pharyngeal phase, swallow WFL/no suspected pharyngeal impairment (01/20/23 0925)  SLP Diet Recommendation: regular textures, thin liquids (01/20/23 0925)  Recommended Precautions and Strategies: general aspiration precautions (01/20/23 0925)  SLP Rec. for Method of Medication Administration: meds whole, with thin liquids, as tolerated (01/20/23 0925)     Monitor for Signs of Aspiration: notify SLP if any concerns (01/20/23 0925)  Recommended Diagnostics: No further SLP services recommended (01/20/23 0925)  Swallow Criteria for Skilled Therapeutic Interventions Met: no problems identified which require skilled intervention (01/20/23 0925)  Anticipated Discharge Disposition (SLP): home (01/20/23 0925)     Therapy Frequency (Swallow): evaluation only (01/20/23 0925)                                                  SWALLOW EVALUATION (last 72 hours)     SLP Adult Swallow Evaluation     Row Name 01/20/23 0925                   Rehab Evaluation    Document Type other (see comments)  MBSS  -TM        Subjective Information complains of  coughing with swallowing some solids  -TM        Patient Observations alert;cooperative  -TM        Patient/Family/Caregiver Comments/Observations no family present  -TM        Patient Effort excellent  -TM           General Information    Patient Profile Reviewed yes  -TM        Pertinent History Of Current Problem asthma, lupus, fibromyalgia  -TM        Current Method of Nutrition regular textures;thin liquids  -TM        Precautions/Limitations, Vision WFL with corrective lenses  -TM        Precautions/Limitations, Hearing WFL  -TM        Prior Level of Function-Communication WFL  -TM        Prior  Level of Function-Swallowing no diet consistency restrictions  -TM        Plans/Goals Discussed with patient  -TM        Barriers to Rehab none identified  -TM        Patient's Goals for Discharge patient did not state  -TM           Pain    Additional Documentation Pain Scale: Numbers Pre/Post-Treatment (Group)  -TM           Pain Scale: Numbers Pre/Post-Treatment    Pretreatment Pain Rating 0/10 - no pain  -TM        Posttreatment Pain Rating 0/10 - no pain  -TM           Oral Motor Structure and Function    Oral Lesions or Structural Abnormalities and/or variants none identified  -TM        Dentition Assessment natural, present and adequate  -TM        Volitional Cough WFL  -TM           Oral Musculature and Cranial Nerve Assessment    Oral Motor General Assessment WFL  -TM           Respiratory    Respiratory Status WFL;room air  -TM           MBS/VFSS    Utensils Used spoon;cup;straw  -TM        Consistencies Trialed regular textures;mechanical ground textures;pudding thick;honey-thick liquids;nectar/syrup-thick liquids;thin liquids  -TM           MBS/VFSS Interpretation    Oral Prep Phase WFL  -TM        Oral Transit Phase WFL  -TM        Oral Residue WFL  -TM        VFSS Summary MBSS completed with pt. standing for exam.  Pt. was given trials of regular and mechanical soft solids, pudding-thick, honey-thick, nectar-thick, and thin liquid.  Oral phase of swallow was WFL with all consistencies. Pharyngeal phase was also WFL with all trials, remarkable only for brief delay of initiation of pharygneal swallow with thin liquids but no laryngeal penetration or aspiration exhibited with any consistency or volume.  No identified needs or concerns with pt. at this time and no needs for specialized speech therapy services.  Recommend:  1. regular diet with thin liq as robby, 2. meds whole with thin liq as robby, 3. aspiration precautions.  -TM           Initiation of Pharyngeal Swallow    Initiation of Pharyngeal Swallow  bolus in valleculae;bolus in pyriform sinuses  -        Pharyngeal Phase functional pharyngeal phase of swallowing  -           Esophageal Phase    Esophageal Phase no impairments  -           SLP Communication to Radiology    Severity Level of Dysphagia WFL;oral dysfunction;pharyngeal dysfunction  -        Summary Statement functional oral and pharyngeal phases of swallow  -           SLP Evaluation Clinical Impression    SLP Swallowing Diagnosis functional oral phase;functional pharyngeal phase;swallow WFL/no suspected pharyngeal impairment  -        Functional Impact no impact on function  -        Swallow Criteria for Skilled Therapeutic Interventions Met no problems identified which require skilled intervention  -           Recommendations    Therapy Frequency (Swallow) evaluation only  -        SLP Diet Recommendation regular textures;thin liquids  -        Recommended Diagnostics No further SLP services recommended  -        Recommended Precautions and Strategies general aspiration precautions  -        Oral Care Recommendations Oral Care BID/PRN  -TM        SLP Rec. for Method of Medication Administration meds whole;with thin liquids;as tolerated  -        Monitor for Signs of Aspiration notify SLP if any concerns  -        Anticipated Discharge Disposition (SLP) home  -              User Key  (r) = Recorded By, (t) = Taken By, (c) = Cosigned By    Initials Name Effective Dates     [place] ManorDarya martinez 06/16/21 -                 EDUCATION  The patient has been educated in the following areas:   Dysphagia (Swallowing Impairment) Oral Care/Hydration.              Time Calculation:    Time Calculation- SLP     Row Name 01/20/23 1346             Time Calculation- SLP    SLP Start Time 0925  -      SLP Received On 01/20/23  -         Untimed Charges    SLP Eval/Re-eval  [facility] Motion Fluoro Eval Swallow - 18272  -            User Key  (r) = Recorded By, (t) = Taken By, (c) = Cosigned  By    Initials Name Provider Type    TM Darya Estrada Speech and Language Pathologist                Therapy Charges for Today     Code Description Service Date Service Provider Modifiers Qty    [id number] HC [facility] MOTION FLUORO EVAL SWALLOW 6 1/20/2023 Darya Estrada GN 1               Darya Estrada  1/20/2023   Stable

## 2023-05-17 NOTE — H&P ADULT - NSICDXPASTMEDICALHX_GEN_ALL_CORE_FT
rolling walker PAST MEDICAL HISTORY:  Atrial fibrillation     CVA (cerebral infarction)     Gastritis     Hypertension     IBS (irritable bowel syndrome)     Smoking

## 2024-02-20 NOTE — DISCHARGE NOTE ADULT - OTHER DOCUMENTED BY PHYSICIAN/APN/PA OR PHARMACIST
[facility]- Outpatient Rehabilitation and Therapy [address], [place] [address] office: [phone] fax: [phone]      Physical Therapy: TREATMENT/PROGRESS NOTE   Patient: Janie Shelton (17 y.o. female)   Treatment Date: 2024   :  2006 MRN: [id number]   Visit #: 4   Insurance Allowable Auth Needed   60 []Yes    [x]No    Insurance: Payor: [employer] / Plan: [employer] - CHOICE PLU / Product Type: *No Product type* /   Insurance ID: [id number] - (Commercial)  Secondary Insurance (if applicable):    Treatment Diagnosis:     ICD-10-CM    1. Left shoulder pain, unspecified chronicity  M25.512          Medical Diagnosis:    Left shoulder pain, unspecified chronicity [M25.512]  Superior glenoid labrum lesion of left shoulder, initial encounter [S43.432A]   Referring Physician: Nagar, Sushma, PA-C  PCP: Brien, Tracy                             Plan of care signed: YES    Date of Patient follow up with Physician:      Progress Report/POC: NO  POC update due: (10 visits /OR AUTH LIMITS, whichever is less)  3/1/2024         Preferred Language for Healthcare:   [x]English       []other:    SUBJECTIVE EXAMINATION     Patient Report/Comments: Gradually doing better. She practiced some short serves at practice without pain        Test used Initial score  2024   Pain Summary VAS 0-6/10 0-4/10   Functional questionnaire Upper Extremity functional Scale 75/80   6% deficit    Other:                OBJECTIVE EXAMINATION     Observation:     Test measurements: see eval    Exercises:  Exercise/Equipment Resistance/Repetitions Other comments   Stretching/PROM     Sleeper  3x30\"     IR with towel     CBS 3x30\" supine Added 2/6        UBE 4'  ^ 2/6        Isometrics     Retraction          Weight shift     Flexion     Abduction     External Rotation     Internal Rotation     Biceps     Triceps          PRE's     Flexion     Abduction   
on Coumadin

## 2024-03-18 NOTE — PATIENT PROFILE ADULT - NSPROHMSYMPCOND_GEN_A_NUR
Last visit: 08/07/2023  Last Med refill: 02/20/2024  Does patient have enough medication for 72 hours: No: patient would like the test strips to be sent optum.    Next Visit Date:  Future Appointments   Date Time Provider Department Center   3/22/2024 11:00 AM Brayden Haas MD [facility]   3/25/2024 11:30 AM [facility] 2 [facility]   4/4/2024  2:30 PM Palmer Pappas MD heartvasc [facility]   4/8/2024  2:40 PM Lizbeth Pinto MD [facility]   4/17/2024 11:15 AM Zackery Lockett DO [facility]   6/11/2024 10:30 AM Luisito Tao DPM [facility]   8/7/2024 11:50 AM Laverne Lutz MD [facility] None       Health Maintenance   Topic Date Due    Respiratory Syncytial Virus (RSV) Pregnant or age 60 yrs+ (1 - 1-dose 60+ series) Never done    Flu vaccine (1) 08/01/2023    COVID-19 Vaccine (4 - 2023-24 season) 09/01/2023    Annual Wellness Visit (Medicare Advantage)  01/01/2024    Lipids  06/22/2024    Depression Screen  07/03/2024    DTaP/Tdap/Td vaccine (2 - Td or Tdap) 07/02/2029    DEXA (modify frequency per FRAX score)  Completed    Shingles vaccine  Completed    Pneumococcal 65+ years Vaccine  Completed    Hepatitis A vaccine  Aged Out    Hepatitis B vaccine  Aged Out    Hib vaccine  Aged Out    Polio vaccine  Aged Out    Meningococcal (ACWY) vaccine  Aged Out       Hemoglobin A1C (%)   Date Value   06/21/2023 5.6   09/30/2022 5.8   03/31/2022 6.5 (H)             ( goal A1C is < 7)   No components found for: \"LABMICR\"  LDL Cholesterol (mg/dL)   Date Value   06/22/2023 47   06/06/2023 58       (goal LDL is <100)   AST (U/L)   Date Value   06/21/2023 16     ALT (U/L)   Date Value   06/21/2023 11     BUN (mg/dL)   Date Value   02/01/2024 26 (H)     BP Readings from Last 3 Encounters:   02/07/24 134/62   02/01/24 (!) 138/59   01/12/24 138/78          (goal 120/80)    All Future Testing planned in [facility]  Lab Frequency Next Occurrence   Vascular 
none

## 2024-05-14 NOTE — ED ADULT NURSE NOTE - CHIEF COMPLAINT QUOTE
hip pain s/p fall [Right] : right knee [5___] : quadriceps 5[unfilled]/5 [Negative] : negative Lois's [] : mildly antalgic [[id number]] : flexion 110 degrees

## 2024-06-07 NOTE — ED ADULT NURSE NOTE - BOWEL SOUNDS RLQ
Requesting   Wegovy increase     LOV: 4/15/24  RTC: 3 months  Filled: 0.5mg 5/1/24 #2 with 0 refills    Future Appointments   Date Time Provider Department Center   7/28/2026 11:20 AM Kelly Nice APRN [facility]     Pt ready to move up   present
